# Patient Record
Sex: MALE | Race: WHITE | NOT HISPANIC OR LATINO | URBAN - METROPOLITAN AREA
[De-identification: names, ages, dates, MRNs, and addresses within clinical notes are randomized per-mention and may not be internally consistent; named-entity substitution may affect disease eponyms.]

---

## 2018-04-23 ENCOUNTER — INPATIENT (INPATIENT)
Facility: HOSPITAL | Age: 83
LOS: 14 days | Discharge: HOME | DRG: 228 | End: 2018-05-08
Attending: HOSPITALIST | Admitting: HOSPITALIST
Payer: MEDICARE

## 2018-04-23 VITALS
HEIGHT: 70 IN | TEMPERATURE: 98 F | SYSTOLIC BLOOD PRESSURE: 192 MMHG | WEIGHT: 169.98 LBS | RESPIRATION RATE: 20 BRPM | HEART RATE: 79 BPM | DIASTOLIC BLOOD PRESSURE: 105 MMHG | OXYGEN SATURATION: 100 %

## 2018-04-23 DIAGNOSIS — R06.02 SHORTNESS OF BREATH: ICD-10-CM

## 2018-04-23 LAB
ALBUMIN SERPL ELPH-MCNC: 3.8 G/DL — SIGNIFICANT CHANGE UP (ref 3.3–5)
ALP SERPL-CCNC: 111 U/L — SIGNIFICANT CHANGE UP (ref 40–120)
ALT FLD-CCNC: 35 U/L — SIGNIFICANT CHANGE UP (ref 10–45)
ANION GAP SERPL CALC-SCNC: 17 MMOL/L — SIGNIFICANT CHANGE UP (ref 5–17)
APTT BLD: 41.7 SEC — HIGH (ref 27.5–37.4)
AST SERPL-CCNC: 26 U/L — SIGNIFICANT CHANGE UP (ref 10–40)
BASE EXCESS BLDV CALC-SCNC: -1.4 MMOL/L — SIGNIFICANT CHANGE UP (ref -2–2)
BASOPHILS # BLD AUTO: 0 K/UL — SIGNIFICANT CHANGE UP (ref 0–0.2)
BASOPHILS NFR BLD AUTO: 0.1 % — SIGNIFICANT CHANGE UP (ref 0–2)
BILIRUB SERPL-MCNC: 0.4 MG/DL — SIGNIFICANT CHANGE UP (ref 0.2–1.2)
BUN SERPL-MCNC: 79 MG/DL — HIGH (ref 7–23)
CA-I SERPL-SCNC: 1.13 MMOL/L — SIGNIFICANT CHANGE UP (ref 1.12–1.3)
CALCIUM SERPL-MCNC: 8.5 MG/DL — SIGNIFICANT CHANGE UP (ref 8.4–10.5)
CHLORIDE BLDV-SCNC: 107 MMOL/L — SIGNIFICANT CHANGE UP (ref 96–108)
CHLORIDE SERPL-SCNC: 105 MMOL/L — SIGNIFICANT CHANGE UP (ref 96–108)
CK MB CFR SERPL CALC: 3 NG/ML — SIGNIFICANT CHANGE UP (ref 0–6.7)
CK SERPL-CCNC: 47 U/L — SIGNIFICANT CHANGE UP (ref 30–200)
CO2 BLDV-SCNC: 28 MMOL/L — SIGNIFICANT CHANGE UP (ref 22–30)
CO2 SERPL-SCNC: 20 MMOL/L — LOW (ref 22–31)
CREAT SERPL-MCNC: 2.88 MG/DL — HIGH (ref 0.5–1.3)
EOSINOPHIL # BLD AUTO: 0 K/UL — SIGNIFICANT CHANGE UP (ref 0–0.5)
EOSINOPHIL NFR BLD AUTO: 0.4 % — SIGNIFICANT CHANGE UP (ref 0–6)
GAS PNL BLDV: 140 MMOL/L — SIGNIFICANT CHANGE UP (ref 136–145)
GAS PNL BLDV: SIGNIFICANT CHANGE UP
GLUCOSE BLDV-MCNC: 198 MG/DL — HIGH (ref 70–99)
GLUCOSE SERPL-MCNC: 195 MG/DL — HIGH (ref 70–99)
HCO3 BLDV-SCNC: 26 MMOL/L — SIGNIFICANT CHANGE UP (ref 21–29)
HCT VFR BLD CALC: 37.7 % — LOW (ref 39–50)
HCT VFR BLDA CALC: 38 % — LOW (ref 39–50)
HGB BLD CALC-MCNC: 12.2 G/DL — LOW (ref 13–17)
HGB BLD-MCNC: 12.9 G/DL — LOW (ref 13–17)
INR BLD: 2.19 RATIO — HIGH (ref 0.88–1.16)
LACTATE BLDV-MCNC: 2.2 MMOL/L — HIGH (ref 0.7–2)
LYMPHOCYTES # BLD AUTO: 1.8 K/UL — SIGNIFICANT CHANGE UP (ref 1–3.3)
LYMPHOCYTES # BLD AUTO: 15.5 % — SIGNIFICANT CHANGE UP (ref 13–44)
MAGNESIUM SERPL-MCNC: 2 MG/DL — SIGNIFICANT CHANGE UP (ref 1.6–2.6)
MCHC RBC-ENTMCNC: 30.6 PG — SIGNIFICANT CHANGE UP (ref 27–34)
MCHC RBC-ENTMCNC: 34.3 GM/DL — SIGNIFICANT CHANGE UP (ref 32–36)
MCV RBC AUTO: 89.4 FL — SIGNIFICANT CHANGE UP (ref 80–100)
MONOCYTES # BLD AUTO: 1.2 K/UL — HIGH (ref 0–0.9)
MONOCYTES NFR BLD AUTO: 10.1 % — SIGNIFICANT CHANGE UP (ref 2–14)
NEUTROPHILS # BLD AUTO: 8.5 K/UL — HIGH (ref 1.8–7.4)
NEUTROPHILS NFR BLD AUTO: 73.8 % — SIGNIFICANT CHANGE UP (ref 43–77)
NT-PROBNP SERPL-SCNC: HIGH PG/ML (ref 0–300)
PCO2 BLDV: 61 MMHG — HIGH (ref 35–50)
PH BLDV: 7.26 — LOW (ref 7.35–7.45)
PHOSPHATE SERPL-MCNC: 3.9 MG/DL — SIGNIFICANT CHANGE UP (ref 2.5–4.5)
PLATELET # BLD AUTO: 332 K/UL — SIGNIFICANT CHANGE UP (ref 150–400)
PO2 BLDV: 28 MMHG — SIGNIFICANT CHANGE UP (ref 25–45)
POTASSIUM BLDV-SCNC: 6.2 MMOL/L — CRITICAL HIGH (ref 3.5–5)
POTASSIUM SERPL-MCNC: 4.5 MMOL/L — SIGNIFICANT CHANGE UP (ref 3.5–5.3)
POTASSIUM SERPL-SCNC: 4.5 MMOL/L — SIGNIFICANT CHANGE UP (ref 3.5–5.3)
PROT SERPL-MCNC: 6.7 G/DL — SIGNIFICANT CHANGE UP (ref 6–8.3)
PROTHROM AB SERPL-ACNC: 24 SEC — HIGH (ref 9.8–12.7)
RAPID RVP RESULT: SIGNIFICANT CHANGE UP
RBC # BLD: 4.22 M/UL — SIGNIFICANT CHANGE UP (ref 4.2–5.8)
RBC # FLD: 16.4 % — HIGH (ref 10.3–14.5)
SAO2 % BLDV: 45 % — LOW (ref 67–88)
SODIUM SERPL-SCNC: 142 MMOL/L — SIGNIFICANT CHANGE UP (ref 135–145)
TROPONIN T SERPL-MCNC: 0.04 NG/ML — SIGNIFICANT CHANGE UP (ref 0–0.06)
WBC # BLD: 11.5 K/UL — HIGH (ref 3.8–10.5)
WBC # FLD AUTO: 11.5 K/UL — HIGH (ref 3.8–10.5)

## 2018-04-23 PROCEDURE — 93010 ELECTROCARDIOGRAM REPORT: CPT

## 2018-04-23 PROCEDURE — 99291 CRITICAL CARE FIRST HOUR: CPT

## 2018-04-23 PROCEDURE — 99223 1ST HOSP IP/OBS HIGH 75: CPT | Mod: GC

## 2018-04-23 PROCEDURE — 71045 X-RAY EXAM CHEST 1 VIEW: CPT | Mod: 26

## 2018-04-23 RX ORDER — CALCIUM GLUCONATE 100 MG/ML
1 VIAL (ML) INTRAVENOUS ONCE
Qty: 0 | Refills: 0 | Status: COMPLETED | OUTPATIENT
Start: 2018-04-23 | End: 2018-04-23

## 2018-04-23 RX ORDER — HEPARIN SODIUM 5000 [USP'U]/ML
5000 INJECTION INTRAVENOUS; SUBCUTANEOUS EVERY 8 HOURS
Qty: 0 | Refills: 0 | Status: DISCONTINUED | OUTPATIENT
Start: 2018-04-23 | End: 2018-04-24

## 2018-04-23 RX ORDER — ASPIRIN/CALCIUM CARB/MAGNESIUM 324 MG
324 TABLET ORAL ONCE
Qty: 0 | Refills: 0 | Status: COMPLETED | OUTPATIENT
Start: 2018-04-23 | End: 2018-04-23

## 2018-04-23 RX ORDER — FUROSEMIDE 40 MG
80 TABLET ORAL ONCE
Qty: 0 | Refills: 0 | Status: COMPLETED | OUTPATIENT
Start: 2018-04-23 | End: 2018-04-23

## 2018-04-23 RX ORDER — SODIUM CHLORIDE 9 MG/ML
3 INJECTION INTRAMUSCULAR; INTRAVENOUS; SUBCUTANEOUS ONCE
Qty: 0 | Refills: 0 | Status: COMPLETED | OUTPATIENT
Start: 2018-04-23 | End: 2018-04-23

## 2018-04-23 RX ADMIN — SODIUM CHLORIDE 3 MILLILITER(S): 9 INJECTION INTRAMUSCULAR; INTRAVENOUS; SUBCUTANEOUS at 21:40

## 2018-04-23 RX ADMIN — Medication 200 GRAM(S): at 22:47

## 2018-04-23 RX ADMIN — Medication 80 MILLIGRAM(S): at 23:16

## 2018-04-23 RX ADMIN — Medication 324 MILLIGRAM(S): at 22:00

## 2018-04-23 NOTE — ED PROVIDER NOTE - PROGRESS NOTE DETAILS
Nash Colby MD (resident): pt improved w/ BIPAP. POCUS w/ bilateral pleural effusions. call #1 and 2 to Dr. Leonardo, awaiting call back. Nash Colby MD (resident): initial elevated K+ on VBG w/ peaked T waves on EKG, empiric Ca2+ given. however pt had normal K+ on CMP. no further hyperK+ Tx at this time. radiology read of xray shows ? infiltrate, no fever, wbc only 11.5, and given significant b/l pleural effusions noted on us and lack of other signs of infection, pneumonia unlikely, more likely effusions 2/2 renal and/or cardiac dysfunction.  Plan now for medicine admission on tele for further management, diuresis and weaning off bipap. Zan

## 2018-04-23 NOTE — ED PROVIDER NOTE - MUSCULOSKELETAL, MLM
Spine appears normal, range of motion is not limited, no muscle or joint tenderness.  2+ pitting edema in b/l LE to pre tibial region.

## 2018-04-23 NOTE — ED ADULT NURSE NOTE - OBJECTIVE STATEMENT
86y/o male with history of CHF, Afib on coumadin, presents to ED via wheelchair a&ox3 c/o SOB. Patient reports he has been feeling SOB since yesterday. Worsens when lying down, having trouble sleeping. Also c/o intermittent CP, resolved upon entering ED. States swelling to legs is a little worse than usual. Denies fever, cough, dizziness, N/V/D, urinary symptoms, recent travel, sick contacts. Crackles heard b/l to lungs. Abd soft, nontender, nondistended. O2sat 79% omn room air, placed on 4L nasal cannula, O2 increased to 100%. Respiratory paged for BiPAP as per MD Cordero's orders. EKG done in ED. Patient on CM. Family at bedside. Safety and comfort maintained.

## 2018-04-23 NOTE — ED PROVIDER NOTE - OBJECTIVE STATEMENT
88yo M with h/o CHF, HTN, exsmoker, Afib (on coumadin), presenting with worsenign shortness of breath and intermittent chest pain since last night; today, shortness of breath worsened, and patient came to ED for further evaluation.  Also c/o nasal congestion / post nasal drip sensation.  No fever/chills.  +cough.  No abdominal pain, no n/v/d.  + leg swelling.  On lasix 80mg qd at home.  Follows with cardiology at [place], has been admitted before at [facility].  Unknown last echo results or EF. 88yo M with h/o CHF, HTN, exsmoker, Afib (on coumadin), presenting with worsening shortness of breath and intermittent chest pain since last night; today, shortness of breath worsened, and patient came to ED for further evaluation.  Also c/o nasal congestion / post nasal drip sensation.  No fever/chills.  +cough.  No abdominal pain, no n/v/d.  + leg swelling.  On lasix 80mg qd at home.  Follows with cardiology at [place], has been admitted before at [facility].  Unknown last echo results or EF.

## 2018-04-23 NOTE — ED PROVIDER NOTE - CARE PLAN
Principal Discharge DX:	Shortness of breath  Secondary Diagnosis:	Congestive heart failure  Secondary Diagnosis:	Acute renal injury

## 2018-04-23 NOTE — ED PROVIDER NOTE - MEDICAL DECISION MAKING DETAILS
Nash Colby MD (resident): midsternal chest pain (intermittent, nonpleuritic) w/ SOB (worse w/ exertion and laying flat), a/w BLE swelling. Exam w/ rales and decreased BS bibasilar. POCUS w/ pleura effusions bilaterally. BIPAP. Pt already on lasix, took dose today. Will redose w/ lasix IV as pt w/ fluid overload.

## 2018-04-23 NOTE — ED PROVIDER NOTE - ATTENDING CONTRIBUTION TO CARE
I have examined and evaluated this patient with the above resident or PA, and agree with the documented clinical history, exam and plan.   Briefly: 88yo M with h/o CHF HTN ex-smoker, Afib on coumadin, presenting with SOB, hypoxia but speaking in full sentences and not appearing to be in respiratory distress.  2+ pitting edema in b/l LE to pre tibial region.  ECG afib, no st elevations, but somewhat peaked t waves (no prior for comparison): concern for possible hyperkalemia, will start calcium gluconate and if K elevated will give additional meds for hyperkalemia.    POCUS shows no global hypokinesis of LV, but lungs notable for b/l at least moderate pleural effusions, few scattered b lines in anterior lung fields.  Concerning for Renal dysfunction vs CHF vs infection: plan: CXR, labs including troponin and pro-bnp.  Will start bipap and give iv lasix.  Will need admission.

## 2018-04-23 NOTE — ED PROVIDER NOTE - CONSTITUTIONAL, MLM
normal... Well appearing, well nourished, awake, alert, oriented to person, place, time/situation and in no apparent distress.  Speaking in full sentences without difficulty, no tachypnea; however, hypoxic to 78-80% on room air.

## 2018-04-24 DIAGNOSIS — R91.8 OTHER NONSPECIFIC ABNORMAL FINDING OF LUNG FIELD: ICD-10-CM

## 2018-04-24 DIAGNOSIS — I48.2 CHRONIC ATRIAL FIBRILLATION: ICD-10-CM

## 2018-04-24 DIAGNOSIS — N17.9 ACUTE KIDNEY FAILURE, UNSPECIFIED: ICD-10-CM

## 2018-04-24 DIAGNOSIS — Z29.9 ENCOUNTER FOR PROPHYLACTIC MEASURES, UNSPECIFIED: ICD-10-CM

## 2018-04-24 DIAGNOSIS — I16.0 HYPERTENSIVE URGENCY: ICD-10-CM

## 2018-04-24 DIAGNOSIS — R63.8 OTHER SYMPTOMS AND SIGNS CONCERNING FOOD AND FLUID INTAKE: ICD-10-CM

## 2018-04-24 DIAGNOSIS — J96.01 ACUTE RESPIRATORY FAILURE WITH HYPOXIA: ICD-10-CM

## 2018-04-24 DIAGNOSIS — R73.9 HYPERGLYCEMIA, UNSPECIFIED: ICD-10-CM

## 2018-04-24 DIAGNOSIS — I50.33 ACUTE ON CHRONIC DIASTOLIC (CONGESTIVE) HEART FAILURE: ICD-10-CM

## 2018-04-24 LAB
APTT BLD: 39 SEC — HIGH (ref 27.5–37.4)
BASOPHILS # BLD AUTO: 0.03 K/UL — SIGNIFICANT CHANGE UP (ref 0–0.2)
BASOPHILS NFR BLD AUTO: 0.4 % — SIGNIFICANT CHANGE UP (ref 0–2)
CK MB BLD-MCNC: 6.7 % — HIGH (ref 0–3.5)
CK MB CFR SERPL CALC: 1.8 NG/ML — SIGNIFICANT CHANGE UP (ref 0–6.7)
CK SERPL-CCNC: 27 U/L — LOW (ref 30–200)
EOSINOPHIL # BLD AUTO: 0.05 K/UL — SIGNIFICANT CHANGE UP (ref 0–0.5)
EOSINOPHIL NFR BLD AUTO: 0.6 % — SIGNIFICANT CHANGE UP (ref 0–6)
HCT VFR BLD CALC: 33.3 % — LOW (ref 39–50)
HGB BLD-MCNC: 10.4 G/DL — LOW (ref 13–17)
IMM GRANULOCYTES NFR BLD AUTO: 1 % — SIGNIFICANT CHANGE UP (ref 0–1.5)
INR BLD: 2.17 RATIO — HIGH (ref 0.88–1.16)
LYMPHOCYTES # BLD AUTO: 1.01 K/UL — SIGNIFICANT CHANGE UP (ref 1–3.3)
LYMPHOCYTES # BLD AUTO: 13 % — SIGNIFICANT CHANGE UP (ref 13–44)
MCHC RBC-ENTMCNC: 28.6 PG — SIGNIFICANT CHANGE UP (ref 27–34)
MCHC RBC-ENTMCNC: 31.2 GM/DL — LOW (ref 32–36)
MCV RBC AUTO: 91.5 FL — SIGNIFICANT CHANGE UP (ref 80–100)
MONOCYTES # BLD AUTO: 0.91 K/UL — HIGH (ref 0–0.9)
MONOCYTES NFR BLD AUTO: 11.7 % — SIGNIFICANT CHANGE UP (ref 2–14)
NEUTROPHILS # BLD AUTO: 5.7 K/UL — SIGNIFICANT CHANGE UP (ref 1.8–7.4)
NEUTROPHILS NFR BLD AUTO: 73.3 % — SIGNIFICANT CHANGE UP (ref 43–77)
OSMOLALITY SERPL: 322 MOS/KG — HIGH (ref 275–300)
OSMOLALITY UR: 376 MOS/KG — SIGNIFICANT CHANGE UP (ref 300–900)
PLATELET # BLD AUTO: 274 K/UL — SIGNIFICANT CHANGE UP (ref 150–400)
PROTHROM AB SERPL-ACNC: 24.9 SEC — HIGH (ref 10–13.1)
RBC # BLD: 3.64 M/UL — LOW (ref 4.2–5.8)
RBC # FLD: 16.8 % — HIGH (ref 10.3–14.5)
SODIUM UR-SCNC: 62 MMOL/L — SIGNIFICANT CHANGE UP
TROPONIN T SERPL-MCNC: 0.05 NG/ML — SIGNIFICANT CHANGE UP (ref 0–0.06)
UUN UR-MCNC: 463 MG/DL — SIGNIFICANT CHANGE UP
WBC # BLD: 7.78 K/UL — SIGNIFICANT CHANGE UP (ref 3.8–10.5)
WBC # FLD AUTO: 7.78 K/UL — SIGNIFICANT CHANGE UP (ref 3.8–10.5)

## 2018-04-24 PROCEDURE — 99233 SBSQ HOSP IP/OBS HIGH 50: CPT

## 2018-04-24 PROCEDURE — 76770 US EXAM ABDO BACK WALL COMP: CPT | Mod: 26

## 2018-04-24 PROCEDURE — 71250 CT THORAX DX C-: CPT | Mod: 26

## 2018-04-24 RX ORDER — HYDRALAZINE HCL 50 MG
10 TABLET ORAL EVERY 6 HOURS
Qty: 0 | Refills: 0 | Status: DISCONTINUED | OUTPATIENT
Start: 2018-04-24 | End: 2018-04-24

## 2018-04-24 RX ORDER — FUROSEMIDE 40 MG
80 TABLET ORAL
Qty: 0 | Refills: 0 | Status: DISCONTINUED | OUTPATIENT
Start: 2018-04-24 | End: 2018-04-27

## 2018-04-24 RX ORDER — METOPROLOL TARTRATE 50 MG
50 TABLET ORAL
Qty: 0 | Refills: 0 | Status: DISCONTINUED | OUTPATIENT
Start: 2018-04-24 | End: 2018-04-24

## 2018-04-24 RX ORDER — METOPROLOL TARTRATE 50 MG
100 TABLET ORAL DAILY
Qty: 0 | Refills: 0 | Status: DISCONTINUED | OUTPATIENT
Start: 2018-04-24 | End: 2018-04-24

## 2018-04-24 RX ORDER — HYDRALAZINE HCL 50 MG
25 TABLET ORAL EVERY 12 HOURS
Qty: 0 | Refills: 0 | Status: DISCONTINUED | OUTPATIENT
Start: 2018-04-24 | End: 2018-05-08

## 2018-04-24 RX ORDER — WARFARIN SODIUM 2.5 MG/1
2 TABLET ORAL ONCE
Qty: 0 | Refills: 0 | Status: COMPLETED | OUTPATIENT
Start: 2018-04-24 | End: 2018-04-24

## 2018-04-24 RX ADMIN — Medication 80 MILLIGRAM(S): at 17:47

## 2018-04-24 RX ADMIN — Medication 10 MILLIGRAM(S): at 09:04

## 2018-04-24 RX ADMIN — Medication 80 MILLIGRAM(S): at 06:22

## 2018-04-24 RX ADMIN — WARFARIN SODIUM 2 MILLIGRAM(S): 2.5 TABLET ORAL at 21:17

## 2018-04-24 RX ADMIN — Medication 25 MILLIGRAM(S): at 17:44

## 2018-04-24 NOTE — H&P ADULT - ASSESSMENT
87 y.o male w/ pmhx a.fib, diastolic CHF (last echo noted 4/2015: LV 60%, severe pulm. htn, mod. AS), HTN presenting w/ c/o of...... 87 y.o male w/ pmhx a.fib, diastolic CHF (last echo noted 4/2015: LV 60%, severe pulm. htn, mod. AS), HTN presenting w/ c/o of CP, admitted for acute on chronic diastolic HF exacerbation most likely in setting of HTN urgency ad 87 y.o male w/ pmhx a.fib, diastolic CHF (last echo noted 4/2015: LV 60%, severe pulm. htn, mod. AS), HTN presenting w/ c/o of CP, admitted for acute on chronic diastolic HF exacerbation most likely in setting of HTN urgency and worsening renal function, ortiz on ckd. 87 y.o male w/ pmhx a.fib, diastolic CHF (last echo noted 4/2015: LV 60%, severe pulm. htn, mod. AS), HTN presenting w/ c/o of CP, admitted for hypoxic respiratory failure and acute on chronic diastolic HF exacerbation most likely in setting of HTN urgency and worsening renal function, ortiz on ckd.

## 2018-04-24 NOTE — CONSULT NOTE ADULT - ASSESSMENT
87 y.o male w/ pmhx a.fib, diastolic CHF (last echo noted 4/2015: LV 60%, severe pulm. htn, mod. 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   renal us revealing atrophic kidney as well with no hydro 87 y.o male w/ pmhx a.fib, diastolic CHF (last echo noted 4/2015: LV 60%, severe pulm. htn, mod. 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   renal us revealing atrophic kidney as well with no hydro      addendum: lasix 80 mg iv bid keep O> I

## 2018-04-24 NOTE — H&P ADULT - PROBLEM SELECTOR PLAN 6
-DASH diet w/ fluid restriction  -PT -CXR w/ evidence of RLL opacity, no clinical symptoms of pna/uri  -will obtain CT chest non contrast

## 2018-04-24 NOTE — ED ADULT NURSE REASSESSMENT NOTE - NS ED NURSE REASSESS COMMENT FT1
respiratory at bedside. Patient placed on BiPAP. Patient tolerating well.
Report given to holding PATRICE Cordero. Patient aware. Patient currently comfortable. VSS.

## 2018-04-24 NOTE — PROGRESS NOTE ADULT - PROBLEM SELECTOR PLAN 3
/105 on admission,  Got Lasix IV and Hydralazine. SBP has been running 148-164  - c/w current meds and will add amlodipine 5mg daily

## 2018-04-24 NOTE — CONSULT NOTE ADULT - CONSULT REQUESTED DATE/TIME
Catalina Marie   11/21/2017   Anticoagulation Therapy Visit    Description:  74 year old female   Provider:  Ian Tracy MD   Department:  [facility]           INR as of 11/21/2017     Today's INR 1.8!      Anticoagulation Summary as of 11/21/2017     INR goal 2.0-3.0   Today's INR 1.8!   Full instructions 2.5 mg on Fri; 5 mg all other days   Next INR check 11/28/2017    Indications   Long-term (current) use of anticoagulants [Z79.01] [Z79.01]  Atrial fibrillation (H) [I48.91] [I48.91]         November 2017 Details    Sun Mon Tue Wed Thu Fri Sat        1               2               3               4                 5               6               7               8               9               10               11                 12               13               14               15               16               17               18                 19               20               21      5 mg   See details      22      5 mg         23      5 mg         24      2.5 mg         25      5 mg           26      5 mg         27      5 mg         28            29               30                  Date Details   11/21 This INR check       Date of next INR:  11/28/2017         How to take your warfarin dose     To take:  2.5 mg Take 0.5 of a 5 mg tablet.    To take:  5 mg Take 1 of the 5 mg tablets.            24-Apr-2018 21:45

## 2018-04-24 NOTE — PROGRESS NOTE ADULT - SUBJECTIVE AND OBJECTIVE BOX
Patient is a 87y old  Male who presents with a chief complaint of SOB and CP.    SUBJECTIVE / OVERNIGHT EVENTS:  Lying in bed, no acute resp. distress, no c/o chest pain now. Tele- a.fib 50-90s, overnight rosa down to 34    MEDICATIONS  (STANDING):  furosemide   Injectable 80 milliGRAM(s) IV Push two times a day    MEDICATIONS  (PRN):  hydrALAZINE Injectable 10 milliGRAM(s) IV Push every 6 hours PRN for SBP>170      Vital Signs Last 24 Hrs  T(C): 36.4 (2018 12:51), Max: 36.7 (2018 21:40)  T(F): 97.6 (2018 12:51), Max: 98.1 (2018 01:00)  HR: 95 (2018 12:51) (50 - 95)  BP: 148/72 (2018 12:51) (145/71 - 192/105)  BP(mean): --  RR: 19 (2018 12:51) (18 - 26)  SpO2: 98% (2018 12:51) (79% - 100%)  CAPILLARY BLOOD GLUCOSE        I&O's Summary    2018 07:  -  2018 07:00  --------------------------------------------------------  IN: 0 mL / OUT: 450 mL / NET: -450 mL    2018 07:01  -  2018 14:06  --------------------------------------------------------  IN: 120 mL / OUT: 500 mL / NET: -380 mL        PHYSICAL EXAM:-  GENERAL: NAD, well-developed  EYES: EOMI, PERRLA, conjunctiva and sclera clear  NECK: Supple, No JVD, no thyromegaly  CHEST/LUNG: Clear to auscultation bilaterally; No wheeze  HEART: Regular rate and rhythm; S1, S2 audible, No murmurs, rubs, or gallops  ABDOMEN: Soft, Nontender, Nondistended; Bowel sounds present  EXTREMITIES:  2+ Peripheral Pulses, No clubbing, cyanosis, or edema  NEURO: AAOx3, no focal deficit      LABS:                        10.4   7.78  )-----------( 274      ( 2018 07:51 )             33.3     04-24    144  |  107  |  79<H>  ----------------------------<  126<H>  4.1   |  24  |  3.00<H>    Ca    8.1<L>      2018 06:48  Phos  3.9     04-24  Mg     1.9     -24    TPro  5.6<L>  /  Alb  3.1<L>  /  TBili  0.4  /  DBili  x   /  AST  16  /  ALT  26  /  AlkPhos  86  04-24    PT/INR - ( 2018 07:51 )   PT: 24.9 sec;   INR: 2.17 ratio         PTT - ( 2018 07:51 )  PTT:39.0 sec  CARDIAC MARKERS ( 2018 06:48 )  x     / 0.05 ng/mL / 27 U/L / x     / 1.8 ng/mL  CARDIAC MARKERS ( 2018 21:59 )  x     / 0.04 ng/mL / 47 U/L / x     / 3.0 ng/mL      Urinalysis Basic - ( 2018 08:24 )    Color: Yellow / Appearance: Clear / S.012 / pH: x  Gluc: x / Ketone: Negative  / Bili: Negative / Urobili: Negative   Blood: x / Protein: 300 mg/dL / Nitrite: Negative   Leuk Esterase: Negative / RBC: 0-2 /HPF / WBC 0-2 /HPF   Sq Epi: x / Non Sq Epi: x / Bacteria: Few /HPF      RADIOLOGY & ADDITIONAL TESTS:    Imaging Personally Reviewed: CXR  Consultant(s) Notes Reviewed:  Card, Renal  Care Discussed with Consultants/Other Providers: card, Renal

## 2018-04-24 NOTE — H&P ADULT - PROBLEM SELECTOR PLAN 3
-Cr. 2.88 (baseline Cr. 2.56 in 1/2016 as per [employer]), patient most likely in ortiz on ckd 2/2 acute diastolic HF 2/2 HTN urgency  -will manage w/ diuresis  -trend Cr daily to monitor for downtrend, strict I's/O's, hobbs placement, daily  weights, fluid restriction  -Urinalysis pending to r/o uti  -will obtain urine lytes   -consider renal u/s if Cr. does not improve -Cr. 2.88 (baseline Cr. 2.56 in 1/2016 as per [employer]), patient most likely in ortiz on ckd 2/2 acute diastolic HF 2/2 HTN urgency vs worsening prgressive ortiz  -will manage w/ diuresis, will bladder scan to r/o obstruction  -trend Cr daily to monitor for downtrend, strict I's/O's, daily  weights, fluid restriction  -will hold off on hobbs as patient would like to defer for now, if clinically worse, will re-discuss w/ patient about hobbs insertion  -Urinalysis pending to r/o uti  -will obtain urine lytes   -consider renal u/s if Cr. does not improve -/105 on admission, s/p lasix 80mgIV x1, currently /71  -patient admitted w/ HTN urgency, will continue to monitor BPs, will place on metoprolol tartrate 50mg BID, and hydralazine 10mg IV q 6 prn for SBP>170  -diuresis management as above  -patient currently on BIPAP, tolerating well, will continue on BIPAP

## 2018-04-24 NOTE — H&P ADULT - PROBLEM SELECTOR PLAN 2
-/105 on admission, s/p lasix 80mgIV x1, currently /71  -patient admitted w/ HTN urgency, will continue to monitor BPs, will continue w/ home beta-blocker  -diuresis management as above  -patient currently on BIPAP, tolerating well, will continue on BIPAP -/105 on admission, s/p lasix 80mgIV x1, currently /71  -patient admitted w/ HTN urgency, will continue to monitor BPs, will continue w/ home beta-blocker-metoprolol tartrate 100mg  -diuresis management as above  -patient currently on BIPAP, tolerating well, will continue on BIPAP -patient noted in ED documentation to become hypoxic to 79% on RA, then placed on BIPAP  -hypoxia most likeley 2/2 pulmonary edema as noted by POCUS  -CXR shows RLL opacity, however no clinical symptoms of URI/Pna, low suspicion for PE  -patient former smoker, will obtain CT chest non-contrast -patient noted in ED documentation to become hypoxic to 79% on RA, then placed on BIPAP  -hypoxia most likeley 2/2 pulmonary edema as noted by POCUS  -CXR shows RLL opacity, however no clinical symptoms of URI/Pna, low suspicion for PE as pt therapeutic on coumadin  -patient former smoker, will obtain CT chest non-contrast

## 2018-04-24 NOTE — H&P ADULT - PROBLEM SELECTOR PLAN 5
-elevated glucose on admission, 195  -will obtain a1c in AM  -continue w/ DASH diet for now CHADSVASC: 4, currently rate controlled  -INR 2.19, therapeutic, goal INR 2-3, check INR daily and dose accordingly

## 2018-04-24 NOTE — PROGRESS NOTE ADULT - PROBLEM SELECTOR PLAN 4
-Cr. 2.88 (had a Cr. of 2.56 in 1/2016 as per [employer]), patient may be in ortiz on ckd 2/2 acute diastolic HF 2/2 HTN urgency vs worsening prgressive ortiz  - Neil placed, Renal US ordered  - Renal consult called from Dr Hema Bob   He is anemia, will sent C3, C4, hepatitis panel, SPEP, UPEP, protein: Creatitine

## 2018-04-24 NOTE — H&P ADULT - HISTORY OF PRESENT ILLNESS
87 y.o male w/ pmhx a.fib, diastolic CHF (last echo noted 4/2015: LV 60%, severe pulm. htn, mod. AS), HTN presenting w/ c/o of......      ED vitals: temp. 97.5, HR 79, /105--->/71, RR 20 (max 26), 100% RA-->79% RA--->100% 4L NC--> now on BIPAP  In the ED: given ASA 324mg x 1, calcium gluconate x 1 g, 80IVmg lasix x 1 87 y.o male w/ pmhx a.fib, diastolic CHF (last echo noted 4/2015: LV 60%, severe pulm. htn, mod. AS), HTN presenting w/ c/o of CP x 1 day. Patient states he had sub-sternal sharp CP which started around 6pm on day of admission. CP was intermittent, non-radiating, no numbness/tingling, diaphoresis, palpitations noted.  He states he has never had this happen before. Associated symptoms include SOB x 2 days and decreased urination x 2months. He states he feel SOB when walking a couple feet and especially feels SOB when he is trying to have a conversation. He has also noted decreased urination in the last two months. Denies fevers/chills, cough, abdominal pain, changes in bowel habits, dysuria, incontinence. All other ROS is negative. No recent changes in medications, or drastic changes in diet. No recent sick contacts. Last saw cardiologist ([phone]) as per daughter six months ago, may have had an echo at that time, daughter unsure. Daughter and patient also state that patient used to have type II DM, used to be on insulin, and his sugars have been controlled, and he has been off medications for two years. He also used to follow a nephrologist, does not recall name, last saw nephrologist over two years ago as per patient.     Currently, CP resolved, SOB improved as per patient. All other ROS is negative.    Of note, [employer] as data from 2015, patient's prior cardiologist-Dr. Praveen Henderson  His notes indicate patient has chronic diastolic HF ,severe pulm HTn, mod AS. Had echo 4/2015-normal systolic function, LVEF: 60%  Labs from 1/2016 Cr. 2.56    ED vitals: temp. 97.5, HR 79, /105--->/71, RR 20 (max 26), 100% RA-->79% RA--->100% 4L NC--> now on BIPAP 10/5, Rate 12-->breathing at rate 18-20, FIO2 40%, , taking in -400  In the ED: given ASA 324mg x 1, calcium gluconate x 1 g, 80IVmg lasix x 1

## 2018-04-24 NOTE — H&P ADULT - NSHPREVIEWOFSYSTEMS_GEN_ALL_CORE
REVIEW OF SYSTEMS:  CONSTITUTIONAL: No fever, weight loss, or fatigue  EYES: No eye pain, visual disturbances, or discharge  ENMT:  No difficulty hearing, tinnitus, vertigo; No sinus or throat pain  NECK: No pain or stiffness  BREASTS: No pain, masses, or nipple discharge  RESPIRATORY: No cough, wheezing, chills or hemoptysis; No shortness of breath  CARDIOVASCULAR: No chest pain, palpitations, dizziness, or leg swelling  GASTROINTESTINAL: No abdominal or epigastric pain. No nausea, vomiting, or hematemesis; No diarrhea or constipation. No melena or hematochezia.  GENITOURINARY: No dysuria, frequency, hematuria, or incontinence  NEUROLOGICAL: No headaches, memory loss, loss of strength, numbness, or tremors  SKIN: No itching, burning, rashes, or lesions   LYMPH NODES: No enlarged glands  ENDOCRINE: No heat or cold intolerance; No hair loss  MUSCULOSKELETAL: No joint pain or swelling; No muscle, back, or extremity pain  PSYCHIATRIC: No depression, anxiety, mood swings, or difficulty sleeping  HEME/LYMPH: No easy bruising, or bleeding gums  ALLERY AND IMMUNOLOGIC: No hives or eczema REVIEW OF SYSTEMS:  CONSTITUTIONAL: No fever, weight loss, or fatigue  EYES: No eye pain, visual disturbances, or discharge  ENMT:  No difficulty hearing, tinnitus, vertigo; No sinus or throat pain  NECK: No pain or stiffness  RESPIRATORY: +SOB  CARDIOVASCULAR: +CP  GASTROINTESTINAL: No abdominal or epigastric pain. No nausea, vomiting, or hematemesis; No diarrhea or constipation. No melena or hematochezia.  GENITOURINARY: No dysuria, frequency, hematuria, or incontinence  NEUROLOGICAL: No headaches, memory loss, loss of strength, numbness, or tremors  SKIN: No itching, burning, rashes, or lesions   LYMPH NODES: No enlarged glands  ENDOCRINE: No heat or cold intolerance; No hair loss  MUSCULOSKELETAL: No joint pain or swelling; No muscle, back, or extremity pain  HEME/LYMPH: No easy bruising, or bleeding gums  ALLERY AND IMMUNOLOGIC: No hives or eczema

## 2018-04-24 NOTE — H&P ADULT - PROBLEM SELECTOR PLAN 1
-proBNP [id number], CXR w/ right pleural effusion, POCUS performed in ED w/ moderate bilateral pleural effusions, scattered B-lines  -most likely acute diastolic HF in setting of HTN urgency   -CE x 1 negative, continue to trend  -continue w/ beta-blocker, currently BPs stable, will continue to monitor  -currently on BIPAP, tolerating well, will continue for now  -daily weights, strict I's/O's, low salt diet, fluid restriction  -obtain echo  -cards consult -proBNP [id number], CXR w/ right pleural effusion, POCUS performed in ED w/ moderate bilateral pleural effusions, scattered B-lines, w/ clinical symptoms of SOB, decreased urination, most likely acute on chronic diastolic HF in setting of HTN urgency and possibly worsening renal function (as clinical hx of dec. urination)  -CE x 1 negative, continue to trend, ekg shows no acute ST changes  -s/p lasix 80mg IV, monitor for urine output, if no response, consider metolozane w/ additional 80mgIV lasix  -continue w/ beta-blocker-metoprolol tartrate 100qd, currently BPs stable, will continue to monitor  -currently on BIPAP, tolerating well, will continue for now  -daily weights, strict I's/O's, low salt diet, fluid restriction. will hold off on hobbs as patient would like to defer for now, if clinically worse, will re-discuss w/ patient about hobbs insertion  -bladder scan to r/o obstruction  -obtain echo, obtain collateral from cardiologist in AM  -cards consult -proBNP [id number], CXR w/ right pleural effusion, POCUS performed in ED w/ moderate bilateral pleural effusions, scattered B-lines, w/ clinical symptoms of SOB, decreased urination, most likely acute on chronic diastolic HF in setting of HTN urgency and possibly worsening renal function (as clinical hx of dec. urination)  -CE x 1 negative, continue to trend, ekg shows no acute ST changes  -s/p lasix 80mg IV, will continue w/ lasix 80mg IV BID, monitor for urine output  -will place on metoprolol tartrate 50mg BID, currently BPs stable, will continue to monitor  -currently on BIPAP, tolerating well, will continue for now  -daily weights, strict I's/O's, low salt diet, fluid restriction. will hold off on hobbs as patient would like to defer for now, if clinically worse, will re-discuss w/ patient about hobbs insertion  -bladder scan to r/o obstruction  -obtain echo, obtain collateral from cardiologist in AM  -cards consult -proBNP [id number], CXR w/ right pleural effusion, POCUS performed in ED w/ moderate bilateral pleural effusions, scattered B-lines, w/ clinical symptoms of SOB, decreased urination, most likely acute on chronic diastolic HF in setting of HTN urgency and possibly worsening renal function (as clinical hx of dec. urination)  -CE x 1 negative, continue to trend, ekg shows no acute ST changes  -s/p lasix 80mg IV, will continue w/ lasix 80mg IV BID, monitor for urine output  -will place on metoprolol tartrate 50mg BID, currently BPs stable, will continue to monitor  -currently on BIPAP, tolerating well, will continue for now  -daily weights, strict I's/O's, low salt diet, fluid restriction. Ordered hobbs but patient would like to defer for now, if clinically worse, will re-discuss w/ patient about hobbs insertion  -bladder scan to r/o obstruction  -obtain echo, obtain collateral from cardiologist in AM  -cards consult

## 2018-04-24 NOTE — PROGRESS NOTE ADULT - PROBLEM SELECTOR PLAN 7
He was diabetic and as per daughter it was controlled on dist.  FSBS has been elevated  -will obtain HbA1C in am  - HSS  -continue w/ DASH diet for now

## 2018-04-24 NOTE — PROGRESS NOTE ADULT - ASSESSMENT
87 y.o male w/ pmhx a.fib, diastolic CHF (last echo noted 4/2015: LV 60%, severe pulm. htn, mod. AS), HTN presenting w/ c/o of CP, SOB admitted for hypoxic respiratory failure and acute on chronic diastolic HF exacerbation most likely in setting of HTN urgency and worsening renal function, ortiz on ckd.

## 2018-04-24 NOTE — H&P ADULT - NSHPLABSRESULTS_GEN_ALL_CORE
Personally Reviewed labs, imaging, ekg by me  04-23    142  |  105  |  79<H>  ----------------------------<  195<H>  4.5   |  20<L>  |  2.88<H>    Ca    8.5      23 Apr 2018 21:59  Phos  3.9     04-23  Mg     2.0     04-23    TPro  6.7  /  Alb  3.8  /  TBili  0.4  /  DBili  x   /  AST  26  /  ALT  35  /  AlkPhos  111  04-23      PT/INR - ( 23 Apr 2018 21:58 )   PT: 24.0 sec;   INR: 2.19 ratio         PTT - ( 23 Apr 2018 21:58 )  PTT:41.7 sec                                        12.9   11.5  )-----------( 332      ( 23 Apr 2018 21:58 )             37.7     CAPILLARY BLOOD GLUCOSE        < from: Xray Chest 1 View AP/PA (04.23.18 @ 22:14) >    INTERPRETATION:  There is a patchy right lower lung opacity.  There is a small right pleural effusion.    < end of copied text >    EKG: Personally Reviewed labs, imaging, ekg by me  04-23    142  |  105  |  79<H>  ----------------------------<  195<H>  4.5   |  20<L>  |  2.88<H>    Ca    8.5      23 Apr 2018 21:59  Phos  3.9     04-23  Mg     2.0     04-23    TPro  6.7  /  Alb  3.8  /  TBili  0.4  /  DBili  x   /  AST  26  /  ALT  35  /  AlkPhos  111  04-23      PT/INR - ( 23 Apr 2018 21:58 )   PT: 24.0 sec;   INR: 2.19 ratio         PTT - ( 23 Apr 2018 21:58 )  PTT:41.7 sec                                        12.9   11.5  )-----------( 332      ( 23 Apr 2018 21:58 )             37.7     CAPILLARY BLOOD GLUCOSE        < from: Xray Chest 1 View AP/PA (04.23.18 @ 22:14) >   a. fib, HR 80, no acute ST changes, left axis deviation Personally Reviewed labs, imaging, ekg by me  04-23    142  |  105  |  79<H>  ----------------------------<  195<H>  4.5   |  20<L>  |  2.88<H>    Ca    8.5      23 Apr 2018 21:59  Phos  3.9     04-23  Mg     2.0     04-23    TPro  6.7  /  Alb  3.8  /  TBili  0.4  /  DBili  x   /  AST  26  /  ALT  35  /  AlkPhos  111  04-23      PT/INR - ( 23 Apr 2018 21:58 )   PT: 24.0 sec;   INR: 2.19 ratio         PTT - ( 23 Apr 2018 21:58 )  PTT:41.7 sec                                        12.9   11.5  )-----------( 332      ( 23 Apr 2018 21:58 )             37.7     CAPILLARY BLOOD GLUCOSE        < from: Xray Chest 1 View AP/PA (04.23.18 @ 22:14) >  EKG  a. fib, HR 80, . no acute ST changes, left axis deviation. ?anterior fascicular block. Qwave in III/V1/V2.

## 2018-04-24 NOTE — PROGRESS NOTE ADULT - PROBLEM SELECTOR PLAN 5
CHADSVASC: 4, currently rate controlled. Overnight HR wa sin 34  -INR 2.19 today,   - Cardiology f/u, c/w Metoprolol and coumadin

## 2018-04-24 NOTE — PROGRESS NOTE ADULT - PROBLEM SELECTOR PLAN 1
The patient has been breathing better on IV Lasix 80mg q 12hrs, Afebrile, Reviewed CXR and Old Echo. ProBNP 12430 in the setting of renal dysfunction., He has been on Lasix 80mg daily at home and urinating less.  - Card consult called, Dr Hermelindo Magana. Spoke to Daughter, Leslie [phone]. She will bring labs, Echo from [facility]  - c/w Metoprolol. Daily I/O, weight  - Echo pending

## 2018-04-24 NOTE — H&P ADULT - PROBLEM SELECTOR PLAN 4
CHADSVASC: 4, currently rate controlled  -INR 2.19, therapeutic, goal INR 2-3, check INR daily and dose accordingly -Cr. 2.88 (baseline Cr. 2.56 in 1/2016 as per [facility]), patient most likely in ortiz on ckd 2/2 acute diastolic HF 2/2 HTN urgency vs worsening prgressive ortiz  -will manage w/ diuresis, will bladder scan to r/o obstruction  -trend Cr daily to monitor for downtrend, strict I's/O's, daily  weights, fluid restriction  -will hold off on hobbs as patient would like to defer for now, if clinically worse, will re-discuss w/ patient about hobbs insertion  -Urinalysis pending to r/o uti  -will obtain urine lytes   -consider renal u/s if Cr. does not improve -Cr. 2.88 (had a Cr. of 2.56 in 1/2016 as per [facility]), patient may be in ortiz on ckd 2/2 acute diastolic HF 2/2 HTN urgency vs worsening prgressive ortiz  -will manage w/ diuresis, will bladder scan to r/o obstruction  -trend Cr daily to monitor for downtrend, strict I's/O's, daily  weights, fluid restriction  -will hold off on hobbs as patient would like to defer for now, if clinically worse, will re-discuss w/ patient about hobbs insertion  -Urinalysis pending to r/o uti  -will obtain urine lytes   -consider renal c/s if Cr. does not improve

## 2018-04-24 NOTE — H&P ADULT - NSHPPHYSICALEXAM_GEN_ALL_CORE
Vital Signs Last 24 Hrs  T(C): 36.7 (23 Apr 2018 21:40), Max: 36.7 (23 Apr 2018 21:40)  T(F): 98 (23 Apr 2018 21:40), Max: 98 (23 Apr 2018 21:40)  HR: 65 (23 Apr 2018 22:53) (65 - 80)  BP: 145/71 (23 Apr 2018 22:53) (145/71 - 192/105)  BP(mean): --  RR: 20 (23 Apr 2018 22:53) (20 - 26)  SpO2: 100% (23 Apr 2018 22:53) (79% - 100%)  PHYSICAL EXAM:  GENERAL: NAD, well-groomed, well-developed  HEAD:  Atraumatic, Normocephalic  EYES: EOMI, PERRLA, conjunctiva and sclera clear  ENMT: No tonsillar erythema, exudates, or enlargement; Moist mucous membranes, Good dentition, No lesions  NECK: Supple, No JVD, Normal thyroid  CHEST/LUNG: Clear to percussion bilaterally; No rales, rhonchi, wheezing, or rubs  HEART: Regular rate and rhythm; No murmurs, rubs, or gallops  ABDOMEN: Soft, Nontender, Nondistended; Bowel sounds present  EXTREMITIES:  2+ Peripheral Pulses, No clubbing, cyanosis, or edema  LYMPH: No lymphadenopathy noted  SKIN: No rashes or lesions  NERVOUS SYSTEM:  Alert & Oriented X3, Good concentration; Motor Strength 5/5 B/L upper and lower extremities; DTRs 2+ intact and symmetric Vital Signs Last 24 Hrs  T(C): 36.7 (23 Apr 2018 21:40), Max: 36.7 (23 Apr 2018 21:40)  T(F): 98 (23 Apr 2018 21:40), Max: 98 (23 Apr 2018 21:40)  HR: 65 (23 Apr 2018 22:53) (65 - 80)  BP: 145/71 (23 Apr 2018 22:53) (145/71 - 192/105)  BP(mean): --  RR: 20 (23 Apr 2018 22:53) (20 - 26)  SpO2: 100% (23 Apr 2018 22:53) (79% - 100%)  PHYSICAL EXAM:  GENERAL: NAD, on BIPAP  HEAD:  Atraumatic, Normocephalic  EYES: conjunctiva and sclera clear  ENMT: No tonsillar erythema, exudates, or enlargement  NECK: Supple, No JVD  CHEST/LUNG: mild crackles in lower bases  HEART: irregular rate and rhythm; No murmurs, rubs, or gallops  ABDOMEN: Soft, Nontender, Nondistended; Bowel sounds present  EXTREMITIES:  2+ Peripheral Pulses, No clubbing, cyanosis, or edema  LYMPH: No lymphadenopathy noted  SKIN: No rashes or lesions  NERVOUS SYSTEM:  Alert & Oriented X3 Vital Signs Last 24 Hrs  T(C): 36.7 (23 Apr 2018 21:40), Max: 36.7 (23 Apr 2018 21:40)  T(F): 98 (23 Apr 2018 21:40), Max: 98 (23 Apr 2018 21:40)  HR: 65 (23 Apr 2018 22:53) (65 - 80)  BP: 145/71 (23 Apr 2018 22:53) (145/71 - 192/105)  BP(mean): --  RR: 20 (23 Apr 2018 22:53) (20 - 26)  SpO2: 100% (23 Apr 2018 22:53) (79% - 100%)  PHYSICAL EXAM:  GENERAL: NAD, on BIPAP  HEAD:  Atraumatic, Normocephalic  EYES: conjunctiva and sclera clear  ENMT: No tonsillar erythema, exudates, or enlargement  NECK: Supple, No JVD  CHEST/LUNG: mild crackles in lower bases  HEART: irregular rate and rhythm; No murmurs, rubs, or gallops  ABDOMEN: Soft, Nontender, Nondistended; Bowel sounds present  EXTREMITIES:  2+ Peripheral Pulses, No clubbing, cyanosis. +2 edema in LE's  LYMPH: No lymphadenopathy noted  SKIN: No rashes or lesions  NERVOUS SYSTEM:  Alert & Oriented X3 Vital Signs Last 24 Hrs  T(C): 36.7 (23 Apr 2018 21:40), Max: 36.7 (23 Apr 2018 21:40)  T(F): 98 (23 Apr 2018 21:40), Max: 98 (23 Apr 2018 21:40)  HR: 65 (23 Apr 2018 22:53) (65 - 80)  BP: 145/71 (23 Apr 2018 22:53) (145/71 - 192/105)  BP(mean): --  RR: 20 (23 Apr 2018 22:53) (20 - 26)  SpO2: 100% (23 Apr 2018 22:53) (79% - 100%)  PHYSICAL EXAM:  GENERAL: NAD, on BIPAP  HEAD:  Atraumatic, Normocephalic  EYES: conjunctiva and sclera clear  ENMT: No tonsillar erythema, exudates, or enlargement  NECK: Supple, No JVD  CHEST/LUNG: mild crackles in lower bases  HEART: irregular rate and rhythm; crescendo-decrescendo systolic murmur 2/6  ABDOMEN: Soft, Nontender, Nondistended; Bowel sounds present  EXTREMITIES:  2+ Peripheral Pulses, No clubbing, cyanosis. +2 edema in LE's  LYMPH: No lymphadenopathy noted  SKIN: No rashes or lesions  NERVOUS SYSTEM:  Alert & Oriented X3 Vital Signs Last 24 Hrs  T(C): 36.7 (23 Apr 2018 21:40), Max: 36.7 (23 Apr 2018 21:40)  T(F): 98 (23 Apr 2018 21:40), Max: 98 (23 Apr 2018 21:40)  HR: 65 (23 Apr 2018 22:53) (65 - 80)  BP: 145/71 (23 Apr 2018 22:53) (145/71 - 192/105)  BP(mean): --  RR: 20 (23 Apr 2018 22:53) (20 - 26)  SpO2: 100% (23 Apr 2018 22:53) (79% - 100%)  PHYSICAL EXAM:  GENERAL: NAD, on BIPAP  HEAD:  Atraumatic, Normocephalic  EYES: conjunctiva and sclera clear  ENMT: No tonsillar erythema, exudates, or enlargement  NECK: Supple, No JVD  CHEST/LUNG: mild crackles in lower bases  HEART: irregular rate and rhythm; crescendo-decrescendo systolic murmur 2/6  ABDOMEN: Soft, Nontender, Nondistended; Bowel sounds present  EXTREMITIES:  2+ Peripheral Pulses, No clubbing, cyanosis. +2 pitting edema in b/l LE's  LYMPH: No lymphadenopathy noted  SKIN: No rashes or lesions  NERVOUS SYSTEM:  Alert & Oriented X3

## 2018-04-24 NOTE — H&P ADULT - PROBLEM SELECTOR PLAN 7
-currently therapeutic INR 2.19, will dose coumadin daily w/ INR checks -elevated glucose on admission, 195  -will obtain a1c in AM  -continue w/ DASH diet for now

## 2018-04-24 NOTE — H&P ADULT - NSHPSOCIALHISTORY_GEN_ALL_CORE
Social History:    Marital Status:  (   )    (   ) Single    (   )    (  )   Occupation:   Lives with: (  ) alone  (  ) children   (  ) spouse   (  ) parents  (  ) other    Substance Use (street drugs): (  ) never used  (  ) other:  Tobacco Usage:  (   ) never smoked   (   ) former smoker   (   ) current smoker  (     ) pack year  (        ) last cigarette date  Alcohol Usage:  Sexual History: Social History:    Marital Status:  (   )    (   ) Single    (X)    (  )   Occupation: used to drive  Lives with: (X) alone  (  ) children   (  ) spouse   (  ) parents  (  ) other    Substance Use (street drugs): (X) never used  (  ) other:  Tobacco Usage:  (   ) never smoked   (X) former smoker-quit 40 years ago, smoked 2PPD-does not know for how many years   (   ) current smoker  (     ) pack year  (        ) last cigarette date  Alcohol Usage: none

## 2018-04-24 NOTE — CONSULT NOTE ADULT - SUBJECTIVE AND OBJECTIVE BOX
[facility]  [phone]  NEPHROLOGY      INITIAL CONSULT NOTE  --------------------------------------------------------------------------------    HPI:  87 y.o male w/ pmhx a.fib, diastolic CHF (last echo noted 4/2015: LV 60%, severe pulm. htn, mod. AS), HTN presenting w/ c/o of CP x 1 day. Patient states he had sub-sternal sharp CP which started around 6pm on day of admission. CP was intermittent, non-radiating, no numbness/tingling, diaphoresis, palpitations noted, SOB x 2 days and decreased urination recently. Labs from 1/2016 Cr. 2.56. now with worsened renal function. renal consult called.     ED vitals: temp. 97.5, HR 79, /105--->/71, RR 20 (max 26), 100% RA-->79% RA--->100% 4L NC-->  BIPAP 10/5, Rate 12-->breathing at rate 18-20, FIO2 40%, ,   In the ED: given ASA 324mg x 1, calcium gluconate x 1 g, 80IVmg lasix x 1 (24 Apr 2018 00:00)        PAST HISTORY  --------------------------------------------------------------------------------  PAST MEDICAL & SURGICAL HISTORY:  Congestive heart failure  HTN (hypertension)  Atrial fibrillation  No significant past surgical history    FAMILY HISTORY:  No pertinent family history in first degree relatives    PAST SOCIAL HISTORY: currently denies tobacco and alcohol use     ALLERGIES & MEDICATIONS  --------------------------------------------------------------------------------  Allergies    No Known Allergies    Intolerances      Standing Inpatient Medications  furosemide   Injectable 80 milliGRAM(s) IV Push two times a day  hydrALAZINE 25 milliGRAM(s) Oral every 12 hours    PRN Inpatient Medications      REVIEW OF SYSTEMS  --------------------------------------------------------------------------------  Gen: No  fevers/chills   Skin: No rashes  Head/Eyes/Ears/Mouth: No headache; Normal hearing;  No sinus pain/discomfort, sore throat  Respiratory: + dyspnea, -cough, - wheezing, - hemoptysis  CV: No chest pain, or palp currently   GI: No abdominal pain, diarrhea, nausea, vomiting, melena  : No dysuria, decrease urination or hesitancy urinating  hematuria  MSK: No joint pain/swelling; no back pain no nsaids use   Neuro: No dizziness/lightheadedness    All other systems were reviewed and are negative, except as noted.    VITALS/PHYSICAL EXAM  --------------------------------------------------------------------------------  T(C): 36.4 (04-24-18 @ 12:51), Max: 36.7 (04-24-18 @ 01:00)  HR: 83 (04-24-18 @ 17:41) (50 - 95)  BP: 182/96 (04-24-18 @ 17:41) (145/71 - 182/96)  RR: 19 (04-24-18 @ 12:51) (18 - 20)  SpO2: 98% (04-24-18 @ 12:51) (98% - 100%)  Wt(kg): --  Height (cm): 177.8 (04-24-18 @ 02:30)  Weight (kg): 79.7 (04-24-18 @ 02:30)  BMI (kg/m2): 25.2 (04-24-18 @ 02:30)  BSA (m2): 1.98 (04-24-18 @ 02:30)      04-23-18 @ 07:01  -  04-24-18 @ 07:00  --------------------------------------------------------  IN: 0 mL / OUT: 450 mL / NET: -450 mL    04-24-18 @ 07:01  -  04-24-18 @ 21:45  --------------------------------------------------------  IN: 360 mL / OUT: 500 mL / NET: -140 mL      Physical Exam:  	Gen: Non toxic ; mildly sob   	+ jvd , supple neck,   	Pulm: decrease bs  no rales or ronchi or wheezing  	CV: RRR, S1S2; no rub  	Back: No CVA tenderness; no sacral edema  	Abd: +BS, soft, nontender/nondistended  	: No suprapubic tenderness  	UE: Warm, no cyanosis  no clubbing,  no edema  	LE: Warm, no cyanosis  no clubbing, 2+ edema  	Neuro: alert and oriented. speech coherent   	  LABS/STUDIES  --------------------------------------------------------------------------------              10.4   7.78  >-----------<  274      [04-24-18 @ 07:51]              33.3     144  |  107  |  79  ----------------------------<  126      [04-24-18 @ 06:48]  4.1   |  24  |  3.00        Ca     8.1     [04-24-18 @ 06:48]      Mg     1.9     [04-24-18 @ 06:48]      Phos  3.9     [04-24-18 @ 06:48]    TPro  5.6  /  Alb  3.1  /  TBili  0.4  /  DBili  x   /  AST  16  /  ALT  26  /  AlkPhos  86  [04-24-18 @ 06:48]    PT/INR: PT 24.9 , INR 2.17       [04-24-18 @ 07:51]  PTT: 39.0       [04-24-18 @ 07:51]    Troponin 0.05      [04-24-18 @ 06:48]  CK 27      [04-24-18 @ 06:48]  Serum Osmolality 322      [04-24-18 @ 06:48]    Creatinine Trend:  SCr 3.00 [04-24 @ 06:48]  SCr 2.88 [04-23 @ 21:59]    Urinalysis - [04-24-18 @ 08:24]      Color Yellow / Appearance Clear / SG 1.012 / pH 5.5      Gluc Negative / Ketone Negative  / Bili Negative / Urobili Negative       Blood Negative / Protein 300 / Leuk Est Negative / Nitrite Negative      RBC 0-2 / WBC 0-2 / Hyaline  / Gran  / Sq Epi  / Non Sq Epi  / Bacteria Few    Urine Sodium 62      [04-24-18 @ 06:48]  Urine Urea Nitrogen 463      [04-24-18 @ 07:54]  Urine Osmolality 376      [04-24-18 @ 06:48]    < from: CT Chest No Cont (04.24.18 @ 17:06) >    ******PRELIMINARY REPORT******    ******PRELIMINARY REPORT******          EXAM:  CT CHEST                            PROCEDURE DATE:  04/24/2018      ******PRELIMINARY REPORT******    ******PRELIMINARY REPORT******              INTERPRETATION:  Bilateral pleural effusions, right greater than left,   with adjacent passive atelectasis.  Cardiomegaly.    Follow up final report.        < from: US Kidney and Bladder (04.24.18 @ 16:39) >    EXAM:  US KIDNEYS AND BLADDER                            PROCEDURE DATE:  04/24/2018            INTERPRETATION:  CLINICAL INFORMATION: Acute kidney injury, creatinine of   2.88.    COMPARISON: None available.    TECHNIQUE: Sonography of the kidneysand bladder.     FINDINGS:    Right kidney: Atrophic, measuring 7.7 cm. Increased cortical   echogenicity. No renal mass, hydronephrosis or calculi.    Left kidney: 13.0 cm. No renal mass, hydronephrosis or calculi. Mild   increase in the echogenicity of the renal parenchyma is noted with   cortical thickness lower limits of normal    Urinary bladder: Collapsed around Neil catheter balloon.    IMPRESSION:   .  No hydronephrosis.    Atrophic echogenic right kidney.    Cannot exclude renal parenchymal disease left kidney.            < end of copied text >

## 2018-04-25 LAB
% ALBUMIN: 61.8 % — SIGNIFICANT CHANGE UP
% ALPHA 1: 6.1 % — SIGNIFICANT CHANGE UP
% ALPHA 2: 10.1 % — SIGNIFICANT CHANGE UP
% BETA: 10.4 % — SIGNIFICANT CHANGE UP
% GAMMA: 11.6 % — SIGNIFICANT CHANGE UP
ALBUMIN SERPL ELPH-MCNC: 3.5 G/DL — LOW (ref 3.6–5.5)
ALBUMIN/GLOB SERPL ELPH: 1.7 RATIO — SIGNIFICANT CHANGE UP
ALPHA1 GLOB SERPL ELPH-MCNC: 0.3 G/DL — SIGNIFICANT CHANGE UP (ref 0.1–0.4)
ALPHA2 GLOB SERPL ELPH-MCNC: 0.6 G/DL — SIGNIFICANT CHANGE UP (ref 0.5–1)
ANION GAP SERPL CALC-SCNC: 13 MMOL/L — SIGNIFICANT CHANGE UP (ref 5–17)
B-GLOBULIN SERPL ELPH-MCNC: 0.6 G/DL — SIGNIFICANT CHANGE UP (ref 0.5–1)
BUN SERPL-MCNC: 83 MG/DL — HIGH (ref 7–23)
C3 SERPL-MCNC: 86 MG/DL — SIGNIFICANT CHANGE UP (ref 80–180)
C4 SERPL-MCNC: 28 MG/DL — SIGNIFICANT CHANGE UP (ref 10–45)
CALCIUM SERPL-MCNC: 8.2 MG/DL — LOW (ref 8.4–10.5)
CHLORIDE SERPL-SCNC: 107 MMOL/L — SIGNIFICANT CHANGE UP (ref 96–108)
CHOLEST SERPL-MCNC: 135 MG/DL — SIGNIFICANT CHANGE UP (ref 10–199)
CO2 SERPL-SCNC: 25 MMOL/L — SIGNIFICANT CHANGE UP (ref 22–31)
CREAT ?TM UR-MCNC: 59 MG/DL — SIGNIFICANT CHANGE UP
CREAT SERPL-MCNC: 3.02 MG/DL — HIGH (ref 0.5–1.3)
ERYTHROCYTE [SEDIMENTATION RATE] IN BLOOD: 34 MM/HR — HIGH (ref 0–20)
GAMMA GLOBULIN: 0.6 G/DL — SIGNIFICANT CHANGE UP (ref 0.6–1.6)
GLUCOSE SERPL-MCNC: 128 MG/DL — HIGH (ref 70–99)
HAV IGM SER-ACNC: SIGNIFICANT CHANGE UP
HBA1C BLD-MCNC: 5.5 % — SIGNIFICANT CHANGE UP (ref 4–5.6)
HBV CORE IGM SER-ACNC: SIGNIFICANT CHANGE UP
HBV SURFACE AG SER-ACNC: SIGNIFICANT CHANGE UP
HCV AB S/CO SERPL IA: 0.08 S/CO — SIGNIFICANT CHANGE UP
HCV AB SERPL-IMP: SIGNIFICANT CHANGE UP
HDLC SERPL-MCNC: 25 MG/DL — LOW (ref 40–125)
INR BLD: 1.95 RATIO — HIGH (ref 0.88–1.16)
LIPID PNL WITH DIRECT LDL SERPL: 76 MG/DL — SIGNIFICANT CHANGE UP
POTASSIUM SERPL-MCNC: 4.3 MMOL/L — SIGNIFICANT CHANGE UP (ref 3.5–5.3)
POTASSIUM SERPL-SCNC: 4.3 MMOL/L — SIGNIFICANT CHANGE UP (ref 3.5–5.3)
PROT ?TM UR-MCNC: 248 MG/DL — HIGH (ref 0–12)
PROT PATTERN SERPL ELPH-IMP: SIGNIFICANT CHANGE UP
PROT SERPL-MCNC: 5.6 G/DL — LOW (ref 6–8.3)
PROT/CREAT UR-RTO: 4.2 RATIO — HIGH (ref 0–0.2)
PROTHROM AB SERPL-ACNC: 21.6 SEC — HIGH (ref 9.8–12.7)
RHEUMATOID FACT SERPL-ACNC: <7 IU/ML — SIGNIFICANT CHANGE UP (ref 0–13)
SODIUM SERPL-SCNC: 145 MMOL/L — SIGNIFICANT CHANGE UP (ref 135–145)
TOTAL CHOLESTEROL/HDL RATIO MEASUREMENT: 5.4 RATIO — SIGNIFICANT CHANGE UP (ref 3.4–9.6)
TRIGL SERPL-MCNC: 172 MG/DL — HIGH (ref 10–149)
TSH SERPL-MCNC: 2.32 UIU/ML — SIGNIFICANT CHANGE UP (ref 0.27–4.2)

## 2018-04-25 PROCEDURE — 99233 SBSQ HOSP IP/OBS HIGH 50: CPT

## 2018-04-25 PROCEDURE — 99223 1ST HOSP IP/OBS HIGH 75: CPT

## 2018-04-25 RX ORDER — WARFARIN SODIUM 2.5 MG/1
3 TABLET ORAL ONCE
Qty: 0 | Refills: 0 | Status: COMPLETED | OUTPATIENT
Start: 2018-04-25 | End: 2018-04-25

## 2018-04-25 RX ORDER — TAMSULOSIN HYDROCHLORIDE 0.4 MG/1
0.4 CAPSULE ORAL AT BEDTIME
Qty: 0 | Refills: 0 | Status: DISCONTINUED | OUTPATIENT
Start: 2018-04-25 | End: 2018-04-26

## 2018-04-25 RX ORDER — SODIUM CHLORIDE 0.65 %
1 AEROSOL, SPRAY (ML) NASAL
Qty: 0 | Refills: 0 | Status: DISCONTINUED | OUTPATIENT
Start: 2018-04-25 | End: 2018-05-08

## 2018-04-25 RX ADMIN — Medication 25 MILLIGRAM(S): at 05:10

## 2018-04-25 RX ADMIN — Medication 25 MILLIGRAM(S): at 17:09

## 2018-04-25 RX ADMIN — Medication 1 SPRAY(S): at 12:16

## 2018-04-25 RX ADMIN — Medication 80 MILLIGRAM(S): at 17:43

## 2018-04-25 RX ADMIN — TAMSULOSIN HYDROCHLORIDE 0.4 MILLIGRAM(S): 0.4 CAPSULE ORAL at 21:11

## 2018-04-25 RX ADMIN — Medication 80 MILLIGRAM(S): at 05:09

## 2018-04-25 RX ADMIN — WARFARIN SODIUM 3 MILLIGRAM(S): 2.5 TABLET ORAL at 21:11

## 2018-04-25 NOTE — CONSULT NOTE ADULT - ASSESSMENT
87 M with AF on A/C, mod AS on last echo presents with chest pain atypical and shortness of breath likely acute on chronic HFpEF  ·	Symptoms improving with diuresis. Would continue for now. Strict I and O. Crt is rising now 3. Likely acute on chronic renal disease.   ·	Repeat echo to assure no progression of AS or development of lv dysfunction.   ·	BP elevated. Resume outpatient dose of Metroprolol  ·	Patient with mild nose bleed with NC. If stable would continue with A/C. Goal INR 2-3.     =======================================================================  Hermelindo Magana MD [facility]    Please page [phone] with questions  On nights and weekend please call the office [phone].

## 2018-04-25 NOTE — PROGRESS NOTE ADULT - PROBLEM SELECTOR PLAN 3
/105 on admission,  Got Lasix IV and Hydralazine. SBP has been running in 155  - Increase Hydralazine 25mg tid, No AVE/ARB for CKD (baseline 2.5)

## 2018-04-25 NOTE — PHYSICAL THERAPY INITIAL EVALUATION ADULT - GENERAL OBSERVATIONS, REHAB EVAL
Pt rec'd OOB in chair, NAD. + faby. On room air, c/o nose bleeding all day 2/2 O2 NC. Pt satting in 90s throughout session on room air, per tele tech.

## 2018-04-25 NOTE — PROGRESS NOTE ADULT - SUBJECTIVE AND OBJECTIVE BOX
[facility]   [phone]  RENAL FOLLOW UP NOTE  --------------------------------------------------------------------------------  Chief Complaint:    24 hour events/subjective:    states feels better. no new c/o are offered     PAST HISTORY  --------------------------------------------------------------------------------  No significant changes to PMH, PSH, FHx, SHx, unless otherwise noted    ALLERGIES & MEDICATIONS  --------------------------------------------------------------------------------  Allergies    No Known Allergies    Intolerances      Standing Inpatient Medications  furosemide   Injectable 80 milliGRAM(s) IV Push two times a day  hydrALAZINE 25 milliGRAM(s) Oral every 12 hours  tamsulosin 0.4 milliGRAM(s) Oral at bedtime  warfarin 3 milliGRAM(s) Oral once    PRN Inpatient Medications  sodium chloride 0.65% Nasal 1 Spray(s) Both Nostrils every 2 hours PRN      REVIEW OF SYSTEMS  --------------------------------------------------------------------------------    Gen: denies  fevers/chills,  CVS: denies chest pain/palpitations  Resp: denies worsening SOB/Cough  GI: Denies N/V/Abd pain  : Denies dysuria/oliguria/hematuria  states still with edema    All other systems were reviewed and are negative, except as noted.    VITALS/PHYSICAL EXAM  --------------------------------------------------------------------------------  T(C): 36.3 (04-25-18 @ 15:00), Max: 37.1 (04-25-18 @ 04:31)  HR: 88 (04-25-18 @ 16:44) (68 - 97)  BP: 138/71 (04-25-18 @ 16:44) (135/68 - 155/73)  RR: 18 (04-25-18 @ 15:00) (18 - 19)  SpO2: 93% (04-25-18 @ 15:00) (93% - 98%)  Wt(kg): --  Height (cm): 177.8 (04-24-18 @ 02:30)  Weight (kg): 79.7 (04-24-18 @ 02:30)  BMI (kg/m2): 25.2 (04-24-18 @ 02:30)  BSA (m2): 1.98 (04-24-18 @ 02:30)      04-24-18 @ 07:01  -  04-25-18 @ 07:00  --------------------------------------------------------  IN: 960 mL / OUT: 1250 mL / NET: -290 mL    04-25-18 @ 07:01  -  04-25-18 @ 20:21  --------------------------------------------------------  IN: 360 mL / OUT: 1000 mL / NET: -640 mL      Physical Exam:  	  + jvd , supple neck,   	Pulm: decrease bs  no rales or ronchi or wheezing  	CV: RRR, S1S2; no rub  	Back: No CVA tenderness; no sacral edema  	Abd: +BS, soft, nontender/nondistended  	: No suprapubic tenderness  	UE: Warm, no cyanosis  no clubbing,  no edema  	LE: Warm, no cyanosis  no clubbing, 2+ edema    LABS/STUDIES	  --------------------------------------------------------------------------------              10.4   7.78  >-----------<  274      [04-24-18 @ 07:51]              33.3     145  |  107  |  83  ----------------------------<  128      [04-25-18 @ 06:07]  4.3   |  25  |  3.02        Ca     8.2     [04-25-18 @ 06:07]      Mg     1.9     [04-24-18 @ 06:48]      Phos  3.9     [04-24-18 @ 06:48]    TPro  x   /  Alb  3.5  /  TBili  x   /  DBili  x   /  AST  x   /  ALT  x   /  AlkPhos  x   [04-24-18 @ 19:22]    PT/INR: PT 21.6 , INR 1.95       [04-25-18 @ 06:00]  PTT: 39.0       [04-24-18 @ 07:51]    Troponin 0.05      [04-24-18 @ 06:48]  CK 27      [04-24-18 @ 06:48]  Serum Osmolality 322      [04-24-18 @ 06:48]    Creatinine Trend:  SCr 3.02 [04-25 @ 06:07]  SCr 3.00 [04-24 @ 06:48]  SCr 2.88 [04-23 @ 21:59]      Urinalysis - [04-24-18 @ 08:24]      Color Yellow / Appearance Clear / SG 1.012 / pH 5.5      Gluc Negative / Ketone Negative  / Bili Negative / Urobili Negative       Blood Negative / Protein 300 / Leuk Est Negative / Nitrite Negative      RBC 0-2 / WBC 0-2 / Hyaline  / Gran  / Sq Epi  / Non Sq Epi  / Bacteria Few    Urine Creatinine 59      [04-24-18 @ 19:19]  Urine Protein 248      [04-24-18 @ 19:19]  Urine Sodium 62      [04-24-18 @ 06:48]  Urine Urea Nitrogen 463      [04-24-18 @ 07:54]  Urine Osmolality 376      [04-24-18 @ 06:48]    HbA1c 5.5      [04-25-18 @ 07:44]  TSH 2.32      [04-25-18 @ 07:53]  Lipid: chol 135, , HDL 25, LDL 76      [04-25-18 @ 07:55]    C3 Complement 86      [04-25-18 @ 08:03]  C4 Complement 28      [04-25-18 @ 08:03]  Rheumatoid Factor <7      [04-25-18 @ 07:55]  SPEP Interpretation: Normal Electrophoresis Pattern      [04-24-18 @ 19:22]

## 2018-04-25 NOTE — PHYSICAL THERAPY INITIAL EVALUATION ADULT - DIAGNOSIS, PT EVAL
Decreased dynamic balance and endurance impacting functional mobility independence and activity tolerance

## 2018-04-25 NOTE — PROGRESS NOTE ADULT - ASSESSMENT
87 y.o male w/ pmhx a.fib, diastolic CHF (last echo noted 4/2015: LV 60%, severe pulm. htn, mod. AS), HTN presenting w/ c/o of CP x 1 day with sob/chf as well as htn urgency    1- CKD IV suspected and progressive in nature given hx of baseline cr 2.56 in 2016 as documented  2- HTN   3- CHF  4- nephrotic syndrome     serologies pending but pt also with ct scan chest with granulomas. to have c-anca/p-anca MPO and anti-proteinase 3 assay   hydralazine to cont for now.  will need to change to arb soon given proteinuria   cont with lasix 80 mg iv bid  keep O> I

## 2018-04-25 NOTE — CONSULT NOTE ADULT - SUBJECTIVE AND OBJECTIVE BOX
Cardiology Consult  Faculty Cardiology  ==========================================================================    Chief Complaint:     HPI:  87 y.o male w/ pmhx a.fib, diastolic CHF (last echo noted 2015: LV 60%, severe pulm. htn, mod. AS), HTN presenting w/ c/o of CP x 1 day. Patient states he had sub-sternal sharp CP which started around 6pm on day of admission. CP was intermittent, non-radiating, no numbness/tingling, diaphoresis, palpitations noted.  He states he has never had this happen before. Associated symptoms include SOB x 2 days and decreased urination x 2months. He states he feel SOB when walking a couple feet and especially feels SOB when he is trying to have a conversation. He has also noted decreased urination in the last two months. Denies fevers/chills, cough, abdominal pain, changes in bowel habits, dysuria, incontinence. All other ROS is negative. No recent changes in medications, or drastic changes in diet. No recent sick contacts. Last saw cardiologist ([phone]) as per daughter six months ago, may have had an echo at that time, daughter unsure. Daughter and patient also state that patient used to have type II DM, used to be on insulin, and his sugars have been controlled, and he has been off medications for two years. He also used to follow a nephrologist, does not recall name, last saw nephrologist over two years ago as per patient.   Of note, allscripts as data from , patient's prior cardiologist-Dr. Praveen Henderson  His notes indicate patient has chronic diastolic HF ,severe pulm HTn, mod AS. Had echo 2015-normal systolic function, LVEF: 60%  Labs from 2016 Cr. 2.56    87 M with PMH as stated presents with chest pain and SOB. Symptoms present for one day. He describes the pain as sharp. No radiation. It is not clearly exertional. It is associated with SOB. It resolves on his own. He also notes increasing SOB independent of the chest pain. He notes it with exertion and now occurs with less exertion. He has AF on A/C with warfarin. He has h/o HFpEF which appears decompensated at this time. HTN with BP that is elevated in the hospital.     PMH:   Congestive heart failure  HTN (hypertension)  Atrial fibrillation    PSH:   No significant past surgical history    Medications:   furosemide   Injectable 80 milliGRAM(s) IV Push two times a day  hydrALAZINE 25 milliGRAM(s) Oral every 12 hours    Allergies:  No Known Allergies    FAMILY HISTORY:  No pertinent family history in first degree relatives    Social History:  Smoking: No active  Alcohol:  Drugs:    Review of Systems:  Constitutional: [ ] Fever [ ] Chills [ ] Fatigue [ ] Weight change   HEENT: [ ] Blurred vision [ ] Eye Pain [ ] Headache [ ] Runny nose [ ] Sore Throat   Respiratory: [ ] Cough [ ] Wheezing [ ] Shortness of breath  Cardiovascular: [ ] Chest Pain [ ] Palpitations [ ] CHACON [ ] PND [ ] Orthopnea  Gastrointestinal: [ ] Abdominal Pain [ ] Diarrhea [ ] Constipation [ ] Hemorrhoids [ ] Nausea [ ] Vomiting  Genitourinary: [ ] Nocturia [ ] Dysuria [ ] Incontinence  Extremities: [ ] Swelling [ ] Joint Pain  Neurologic: [ ] Focal deficit [ ] Paresthesias [ ] Syncope  Lymphatic: [ ] Swelling [ ] Lymphadenopathy   Skin: [ ] Rash [ ] Ecchymoses [ ] Wounds [ ] Lesions  Psychiatry: [ ] Depression [ ] Suicidal/Homicidal Ideation [ ] Anxiety [ ] Sleep Disturbances  [X ] 10 point review of systems is otherwise negative except as mentioned above            [ ]Unable to obtain    Physical Exam:  T(C): 37.1 (18 @ 04:31), Max: 37.1 (18 @ 04:31)  HR: 93 (18 @ 04:31) (77 - 97)  BP: 151/80 (18 @ 04:31) (147/76 - 182/96)  RR: 18 (18 @ 04:31) (18 - 20)  SpO2: 93% (18 @ 04:31) (93% - 98%)  Wt(kg): --     @ 07:01  -   @ 07:00  --------------------------------------------------------  IN: 960 mL / OUT: 1250 mL / NET: -290 mL      Daily     Daily Weight in k (2018 04:31)    Appearance: [x ] Normal [x ] NAD  Eyes: [x ] PERRL [x ] EOMI  HENT: [x ] Normal oral muscosa [x ]NC/AT  Neck: [x] Supple [x] FROM [x] No JVD  Cardiovascular: [x ] S1, S2 [x ] RRR [x ] No m/r/g [x ]No edema  Respiratory: [x ] Clear to auscultation bilaterally [x ] Normal Effort  Gastrointestinal: [x ] Soft [x ] Non-tender [x ] Non-distended [x ] BS+  Musculoskeletal: [x ] No clubbing [x] No joint deformity   Neurologic: [x ] Non-focal  Lymphatic: [x ] No lymphadenopathy  Psychiatry: [x ] AAOx3 [x ] Mood & affect appropriate  Skin: [ x] No rashes [ x] No ecchymoses [x ] No cyanosis [x ] warm and dry    Procedural Access Site: [ ] No hematoma [ ] Non-tender to palpation [ ] 2+ pulse [ ] No bruit [ ] No Ecchymosis    Cardiovascular Diagnostic Testing:  ECG: AF @ 80, LAD. NS-ST-T changes    Echo:     Stress Testing:    Cath:     Interpretation of Telemetry:    Imaging: < from: CT Chest No Cont (18 @ 17:06) >  INTERPRETATION:  Bilateral pleural effusions, right greater than left,   with adjacent passive atelectasis.  Cardiomegaly.    < end of copied text >      Labs:                        10.4   7.78  )-----------( 274      ( 2018 07:51 )             33.3     04-25    145  |  107  |  83<H>  ----------------------------<  128<H>  4.3   |  25  |  3.02<H>    Ca    8.2<L>      2018 06:07  Phos  3.9     04-24  Mg     1.9     04-24    TPro  5.6<L>  /  Alb  3.1<L>  /  TBili  0.4  /  DBili  x   /  AST  16  /  ALT  26  /  AlkPhos  86      PT/INR - ( 2018 06:00 )   PT: 21.6 sec;   INR: 1.95 ratio         PTT - ( 2018 07:51 )  PTT:39.0 sec  CARDIAC MARKERS ( 2018 06:48 )  x     / 0.05 ng/mL / 27 U/L / x     / 1.8 ng/mL  CARDIAC MARKERS ( 2018 21:59 )  x     / 0.04 ng/mL / 47 U/L / x     / 3.0 ng/mL      Serum Pro-Brain Natriuretic Peptide: [id number] pg/mL ( @ 21:59)        Thyroid Stimulating Hormone, Serum: 2.32 uIU/mL ( @ 07:53) Cardiology Consult  Faculty Cardiology  ==========================================================================    Chief Complaint:     HPI:  87 y.o male w/ pmhx a.fib, diastolic CHF (last echo noted 2015: LV 60%, severe pulm. htn, mod. AS), HTN presenting w/ c/o of CP x 1 day. Patient states he had sub-sternal sharp CP which started around 6pm on day of admission. CP was intermittent, non-radiating, no numbness/tingling, diaphoresis, palpitations noted.  He states he has never had this happen before. Associated symptoms include SOB x 2 days and decreased urination x 2months. He states he feel SOB when walking a couple feet and especially feels SOB when he is trying to have a conversation. He has also noted decreased urination in the last two months. Denies fevers/chills, cough, abdominal pain, changes in bowel habits, dysuria, incontinence. All other ROS is negative. No recent changes in medications, or drastic changes in diet. No recent sick contacts. Last saw cardiologist ([phone]) as per daughter six months ago, may have had an echo at that time, daughter unsure. Daughter and patient also state that patient used to have type II DM, used to be on insulin, and his sugars have been controlled, and he has been off medications for two years. He also used to follow a nephrologist, does not recall name, last saw nephrologist over two years ago as per patient.   Of note, allscripts as data from , patient's prior cardiologist-Dr. Praveen Henderson  His notes indicate patient has chronic diastolic HF ,severe pulm HTn, mod AS. Had echo 2015-normal systolic function, LVEF: 60%  Labs from 2016 Cr. 2.56    87 M with PMH as stated presents with chest pain and SOB. Symptoms present for one day. He describes the pain as sharp. No radiation. It is not clearly exertional. It is associated with SOB. It resolves on his own. He also notes increasing SOB independent of the chest pain. He notes it with exertion and now occurs with less exertion. He has AF on A/C with warfarin. He has h/o HFpEF which appears decompensated at this time. HTN with BP that is elevated in the hospital.     PMH:   Congestive heart failure  HTN (hypertension)  Atrial fibrillation    PSH:   No significant past surgical history    Medications:   furosemide   Injectable 80 milliGRAM(s) IV Push two times a day  hydrALAZINE 25 milliGRAM(s) Oral every 12 hours    Allergies:  No Known Allergies    FAMILY HISTORY:  No pertinent family history in first degree relatives    Social History:  Smoking: No active  Alcohol:  Drugs:    Review of Systems:  Constitutional: [ ] Fever [ ] Chills [ ] Fatigue [ ] Weight change   HEENT: [ ] Blurred vision [ ] Eye Pain [ ] Headache [ ] Runny nose [ ] Sore Throat   Respiratory: [ ] Cough [ ] Wheezing [ ] Shortness of breath  Cardiovascular: [ ] Chest Pain [ ] Palpitations [ ] CHACON [ ] PND [ ] Orthopnea  Gastrointestinal: [ ] Abdominal Pain [ ] Diarrhea [ ] Constipation [ ] Hemorrhoids [ ] Nausea [ ] Vomiting  Genitourinary: [ ] Nocturia [ ] Dysuria [ ] Incontinence  Extremities: [ ] Swelling [ ] Joint Pain  Neurologic: [ ] Focal deficit [ ] Paresthesias [ ] Syncope  Lymphatic: [ ] Swelling [ ] Lymphadenopathy   Skin: [ ] Rash [ ] Ecchymoses [ ] Wounds [ ] Lesions  Psychiatry: [ ] Depression [ ] Suicidal/Homicidal Ideation [ ] Anxiety [ ] Sleep Disturbances  [X ] 10 point review of systems is otherwise negative except as mentioned above            [ ]Unable to obtain    Physical Exam:  T(C): 37.1 (18 @ 04:31), Max: 37.1 (18 @ 04:31)  HR: 93 (18 @ 04:31) (77 - 97)  BP: 151/80 (18 @ 04:31) (147/76 - 182/96)  RR: 18 (18 @ 04:31) (18 - 20)  SpO2: 93% (18 @ 04:31) (93% - 98%)  Wt(kg): --     @ 07:01  -   @ 07:00  --------------------------------------------------------  IN: 960 mL / OUT: 1250 mL / NET: -290 mL      Daily     Daily Weight in k (2018 04:31)    Appearance: [x ] Normal [x ] NAD  Eyes: [x ] PERRL [x ] EOMI  HENT: [x ] Normal oral muscosa [x ]NC/AT  Neck: [x] Supple [x] FROM [x] No JVD  Cardiovascular: [x ] S1, S2 [x ] IRIR [x ] 3/6 BRANNON [x ]No edema  Respiratory: [x ] decreased at bases.  [x ] Normal Effort  Gastrointestinal: [x ] Soft [x ] Non-tender [x ] Non-distended [x ] BS+  Musculoskeletal: [x ] No clubbing [x] No joint deformity   Neurologic: [x ] Non-focal  Lymphatic: [x ] No lymphadenopathy  Psychiatry: [x ] AAOx3 [x ] Mood & affect appropriate  Skin: [ x] No rashes [ x] No ecchymoses [x ] No cyanosis [x ] warm and dry        Cardiovascular Diagnostic Testing:  ECG: AF @ 80, LAD. NS-ST-T changes    Echo:     Stress Testing:    Cath:     Interpretation of Telemetry:    Imaging: < from: CT Chest No Cont (18 @ 17:06) >  INTERPRETATION:  Bilateral pleural effusions, right greater than left,   with adjacent passive atelectasis.  Cardiomegaly.    < end of copied text >      Labs:                        10.4   7.78  )-----------( 274      ( 2018 07:51 )             33.3     04-25    145  |  107  |  83<H>  ----------------------------<  128<H>  4.3   |  25  |  3.02<H>    Ca    8.2<L>      2018 06:07  Phos  3.9     04-24  Mg     1.9     04-24    TPro  5.6<L>  /  Alb  3.1<L>  /  TBili  0.4  /  DBili  x   /  AST  16  /  ALT  26  /  AlkPhos  86      PT/INR - ( 2018 06:00 )   PT: 21.6 sec;   INR: 1.95 ratio         PTT - ( 2018 07:51 )  PTT:39.0 sec  CARDIAC MARKERS ( 2018 06:48 )  x     / 0.05 ng/mL / 27 U/L / x     / 1.8 ng/mL  CARDIAC MARKERS ( 2018 21:59 )  x     / 0.04 ng/mL / 47 U/L / x     / 3.0 ng/mL      Serum Pro-Brain Natriuretic Peptide: 63647 pg/mL ( @ 21:59)        Thyroid Stimulating Hormone, Serum: 2.32 uIU/mL ( @ 07:53)

## 2018-04-25 NOTE — PROGRESS NOTE ADULT - SUBJECTIVE AND OBJECTIVE BOX
Patient is a 87y old  Male who presents with a chief complaint of Cheast pain and SOB.    SUBJECTIVE / OVERNIGHT EVENTS:  Sitting in chair, no acute distress, O2 sat 95% in RA. Tele- A.fib 98      MEDICATIONS  (STANDING):  furosemide   Injectable 80 milliGRAM(s) IV Push two times a day  hydrALAZINE 25 milliGRAM(s) Oral every 12 hours  tamsulosin 0.4 milliGRAM(s) Oral at bedtime  warfarin 3 milliGRAM(s) Oral once    MEDICATIONS  (PRN):  sodium chloride 0.65% Nasal 1 Spray(s) Both Nostrils every 2 hours PRN nasal dryness      Vital Signs Last 24 Hrs  T(C): 36.7 (2018 12:02), Max: 37.1 (2018 04:31)  T(F): 98.1 (2018 12:02), Max: 98.7 (2018 04:31)  HR: 97 (2018 12:02) (83 - 97)  BP: 155/73 (2018 12:02) (147/76 - 182/96)  BP(mean): --  RR: 18 (2018 12:02) (18 - 19)  SpO2: 94% (2018 12:02) (93% - 98%)  CAPILLARY BLOOD GLUCOSE        I&O's Summary    2018 07:  -  2018 07:00  --------------------------------------------------------  IN: 960 mL / OUT: 1250 mL / NET: -290 mL    2018 07:  -  2018 14:55  --------------------------------------------------------  IN: 360 mL / OUT: 1000 mL / NET: -640 mL        PHYSICAL EXAM:-  GENERAL: NAD, well-developed  EYES: EOMI, PERRLA, conjunctiva and sclera clear  NECK: Supple, No JVD, no thyromegaly  CHEST/LUNG: Clear to auscultation bilaterally; No wheeze  HEART: Regular rate and rhythm; S1, S2 audible, No murmurs, rubs, or gallops  ABDOMEN: Soft, Nontender, Nondistended; Bowel sounds present  EXTREMITIES:  2+ Peripheral Pulses, No clubbing, cyanosis, or edema  NEURO: AAOx3, no focal deficit      LABS:                        10.4   7.78  )-----------( 274      ( 2018 07:51 )             33.3     -    145  |  107  |  83<H>  ----------------------------<  128<H>  4.3   |  25  |  3.02<H>    Ca    8.2<L>      2018 06:07  Phos  3.9     -24  Mg     1.9     24    TPro  x   /  Alb  3.5<L>  /  TBili  x   /  DBili  x   /  AST  x   /  ALT  x   /  AlkPhos  x   24    PT/INR - ( 2018 06:00 )   PT: 21.6 sec;   INR: 1.95 ratio         PTT - ( 2018 07:51 )  PTT:39.0 sec  CARDIAC MARKERS ( 2018 06:48 )  x     / 0.05 ng/mL / 27 U/L / x     / 1.8 ng/mL  CARDIAC MARKERS ( 2018 21:59 )  x     / 0.04 ng/mL / 47 U/L / x     / 3.0 ng/mL      Urinalysis Basic - ( 2018 08:24 )    Color: Yellow / Appearance: Clear / S.012 / pH: x  Gluc: x / Ketone: Negative  / Bili: Negative / Urobili: Negative   Blood: x / Protein: 300 mg/dL / Nitrite: Negative   Leuk Esterase: Negative / RBC: 0-2 /HPF / WBC 0-2 /HPF   Sq Epi: x / Non Sq Epi: x / Bacteria: Few /HPF      RADIOLOGY & ADDITIONAL TESTS:    Imaging Personally Reviewed: CXR, Old Echo  Consultant(s) Notes Reviewed:  Card, Renal  Care Discussed with Consultants/Other Providers: card, Renal

## 2018-04-25 NOTE — PROGRESS NOTE ADULT - PROBLEM SELECTOR PLAN 1
Reviewed Echo last year (outpatient) EF 50-65%  The patient has been breathing better on IV Lasix 80mg q 12hrs, Afebrile,   - Cardiology consult appreciated  - c/w IV lasix 40mg q 12hrs, Metoprolol, hydrlazine  - daily I/O and weight

## 2018-04-25 NOTE — PHYSICAL THERAPY INITIAL EVALUATION ADULT - ADDITIONAL COMMENTS
Pt lives alone in private home, 2 DEAN no rail. PTA, independent with SPC. Denies owning any other DME. Tub shower, + grab bars.

## 2018-04-25 NOTE — PHYSICAL THERAPY INITIAL EVALUATION ADULT - PLANNED THERAPY INTERVENTIONS, PT EVAL
gait training/Stair Training Goal: Pt will asc/desc 2 steps x independent with SPC in 3 weeks./transfer training/balance training/bed mobility training

## 2018-04-25 NOTE — PROGRESS NOTE ADULT - PROBLEM SELECTOR PLAN 4
-Cr. 2.88 (had a Cr. of 2.56 in 1/2016 as per [employer]), patient may be in ortiz on ckd 2/2 acute diastolic HF 2/2 HTN urgency  - Neil placed, Renal US ordered  - Renal consult appreciated from Dr Hema Bob   He is anemia, will sent C3, C4, hepatitis panel, SPEP, UPEP, protein: Creatitine

## 2018-04-25 NOTE — PHYSICAL THERAPY INITIAL EVALUATION ADULT - BALANCE TRAINING, PT EVAL
Goal: Pt's standing balance will improve, decreasing fall risk and increasing independence with functional mobility in 3 weeks.

## 2018-04-25 NOTE — PHYSICAL THERAPY INITIAL EVALUATION ADULT - PERTINENT HX OF CURRENT PROBLEM, REHAB EVAL
87y M admitted 4/23 for chest pain and SOB 2/2 hypoxic respiratory failure, acute on chronic CHF exacerbation, and TRAVIS on CKD. CXR/POCUS (+) for B pleural effusions.

## 2018-04-25 NOTE — PHYSICAL THERAPY INITIAL EVALUATION ADULT - PATIENT/FAMILY AGREES WITH PLAN
PT recommends outpatient PT for balance, endurance, and gait/stair training. Pt states he "will think about it" but likely does not want therapy post d/c./no

## 2018-04-26 DIAGNOSIS — I48.91 UNSPECIFIED ATRIAL FIBRILLATION: ICD-10-CM

## 2018-04-26 DIAGNOSIS — E11.9 TYPE 2 DIABETES MELLITUS WITHOUT COMPLICATIONS: ICD-10-CM

## 2018-04-26 DIAGNOSIS — I10 ESSENTIAL (PRIMARY) HYPERTENSION: ICD-10-CM

## 2018-04-26 DIAGNOSIS — R80.9 PROTEINURIA, UNSPECIFIED: ICD-10-CM

## 2018-04-26 DIAGNOSIS — R33.8 OTHER RETENTION OF URINE: ICD-10-CM

## 2018-04-26 LAB
ANA TITR SER: NEGATIVE — SIGNIFICANT CHANGE UP
ANION GAP SERPL CALC-SCNC: 12 MMOL/L — SIGNIFICANT CHANGE UP (ref 5–17)
BASOPHILS # BLD AUTO: 0 K/UL — SIGNIFICANT CHANGE UP (ref 0–0.2)
BASOPHILS NFR BLD AUTO: 0.1 % — SIGNIFICANT CHANGE UP (ref 0–2)
BUN SERPL-MCNC: 82 MG/DL — HIGH (ref 7–23)
CALCIUM SERPL-MCNC: 8.2 MG/DL — LOW (ref 8.4–10.5)
CHLORIDE SERPL-SCNC: 106 MMOL/L — SIGNIFICANT CHANGE UP (ref 96–108)
CO2 SERPL-SCNC: 24 MMOL/L — SIGNIFICANT CHANGE UP (ref 22–31)
CREAT SERPL-MCNC: 2.97 MG/DL — HIGH (ref 0.5–1.3)
CREATININE, URINE RESULT: 41 MG/DL — SIGNIFICANT CHANGE UP
EOSINOPHIL # BLD AUTO: 0.1 K/UL — SIGNIFICANT CHANGE UP (ref 0–0.5)
EOSINOPHIL NFR BLD AUTO: 1 % — SIGNIFICANT CHANGE UP (ref 0–6)
GLUCOSE SERPL-MCNC: 130 MG/DL — HIGH (ref 70–99)
HCT VFR BLD CALC: 29.8 % — LOW (ref 39–50)
HGB BLD-MCNC: 10 G/DL — LOW (ref 13–17)
INR BLD: 2.14 RATIO — HIGH (ref 0.88–1.16)
LYMPHOCYTES # BLD AUTO: 1 K/UL — SIGNIFICANT CHANGE UP (ref 1–3.3)
LYMPHOCYTES # BLD AUTO: 14.9 % — SIGNIFICANT CHANGE UP (ref 13–44)
MCHC RBC-ENTMCNC: 29.9 PG — SIGNIFICANT CHANGE UP (ref 27–34)
MCHC RBC-ENTMCNC: 33.5 GM/DL — SIGNIFICANT CHANGE UP (ref 32–36)
MCV RBC AUTO: 89.2 FL — SIGNIFICANT CHANGE UP (ref 80–100)
MONOCYTES # BLD AUTO: 0.7 K/UL — SIGNIFICANT CHANGE UP (ref 0–0.9)
MONOCYTES NFR BLD AUTO: 9.5 % — SIGNIFICANT CHANGE UP (ref 2–14)
MYELOPEROXIDASE AB SER-ACNC: <5 UNITS — SIGNIFICANT CHANGE UP
MYELOPEROXIDASE CELLS FLD QL: NEGATIVE — SIGNIFICANT CHANGE UP
NEUTROPHILS # BLD AUTO: 5.2 K/UL — SIGNIFICANT CHANGE UP (ref 1.8–7.4)
NEUTROPHILS NFR BLD AUTO: 74.5 % — SIGNIFICANT CHANGE UP (ref 43–77)
PLATELET # BLD AUTO: 227 K/UL — SIGNIFICANT CHANGE UP (ref 150–400)
POTASSIUM SERPL-MCNC: 4.1 MMOL/L — SIGNIFICANT CHANGE UP (ref 3.5–5.3)
POTASSIUM SERPL-SCNC: 4.1 MMOL/L — SIGNIFICANT CHANGE UP (ref 3.5–5.3)
PROT ?TM UR-MCNC: 174 MG/DL — HIGH (ref 0–12)
PROTEINASE3 AB FLD-ACNC: <5 UNITS — SIGNIFICANT CHANGE UP
PROTEINASE3 AB SER-ACNC: NEGATIVE — SIGNIFICANT CHANGE UP
PROTHROM AB SERPL-ACNC: 23.5 SEC — HIGH (ref 9.8–12.7)
RBC # BLD: 3.34 M/UL — LOW (ref 4.2–5.8)
RBC # FLD: 16 % — HIGH (ref 10.3–14.5)
SODIUM SERPL-SCNC: 142 MMOL/L — SIGNIFICANT CHANGE UP (ref 135–145)
WBC # BLD: 7 K/UL — SIGNIFICANT CHANGE UP (ref 3.8–10.5)
WBC # FLD AUTO: 7 K/UL — SIGNIFICANT CHANGE UP (ref 3.8–10.5)

## 2018-04-26 PROCEDURE — 99233 SBSQ HOSP IP/OBS HIGH 50: CPT

## 2018-04-26 PROCEDURE — 99232 SBSQ HOSP IP/OBS MODERATE 35: CPT

## 2018-04-26 PROCEDURE — 93306 TTE W/DOPPLER COMPLETE: CPT | Mod: 26

## 2018-04-26 RX ORDER — METOPROLOL TARTRATE 50 MG
100 TABLET ORAL DAILY
Qty: 0 | Refills: 0 | Status: DISCONTINUED | OUTPATIENT
Start: 2018-04-26 | End: 2018-04-26

## 2018-04-26 RX ORDER — METOPROLOL TARTRATE 50 MG
25 TABLET ORAL
Qty: 0 | Refills: 0 | Status: DISCONTINUED | OUTPATIENT
Start: 2018-04-27 | End: 2018-04-27

## 2018-04-26 RX ORDER — SENNA PLUS 8.6 MG/1
2 TABLET ORAL AT BEDTIME
Qty: 0 | Refills: 0 | Status: DISCONTINUED | OUTPATIENT
Start: 2018-04-26 | End: 2018-05-08

## 2018-04-26 RX ORDER — WARFARIN SODIUM 2.5 MG/1
2.5 TABLET ORAL ONCE
Qty: 0 | Refills: 0 | Status: COMPLETED | OUTPATIENT
Start: 2018-04-26 | End: 2018-04-26

## 2018-04-26 RX ORDER — DOCUSATE SODIUM 100 MG
100 CAPSULE ORAL THREE TIMES A DAY
Qty: 0 | Refills: 0 | Status: DISCONTINUED | OUTPATIENT
Start: 2018-04-26 | End: 2018-05-08

## 2018-04-26 RX ORDER — FINASTERIDE 5 MG/1
5 TABLET, FILM COATED ORAL DAILY
Qty: 0 | Refills: 0 | Status: DISCONTINUED | OUTPATIENT
Start: 2018-04-26 | End: 2018-05-08

## 2018-04-26 RX ADMIN — Medication 1 SPRAY(S): at 05:30

## 2018-04-26 RX ADMIN — FINASTERIDE 5 MILLIGRAM(S): 5 TABLET, FILM COATED ORAL at 18:33

## 2018-04-26 RX ADMIN — Medication 80 MILLIGRAM(S): at 18:30

## 2018-04-26 RX ADMIN — Medication 100 MILLIGRAM(S): at 18:33

## 2018-04-26 RX ADMIN — Medication 80 MILLIGRAM(S): at 05:29

## 2018-04-26 RX ADMIN — Medication 25 MILLIGRAM(S): at 18:41

## 2018-04-26 RX ADMIN — WARFARIN SODIUM 2.5 MILLIGRAM(S): 2.5 TABLET ORAL at 21:14

## 2018-04-26 RX ADMIN — SENNA PLUS 2 TABLET(S): 8.6 TABLET ORAL at 21:14

## 2018-04-26 RX ADMIN — Medication 100 MILLIGRAM(S): at 11:04

## 2018-04-26 RX ADMIN — Medication 100 MILLIGRAM(S): at 21:14

## 2018-04-26 RX ADMIN — Medication 25 MILLIGRAM(S): at 05:29

## 2018-04-26 NOTE — PROGRESS NOTE ADULT - SUBJECTIVE AND OBJECTIVE BOX
Cardiology Follow Up  ==========================================  CC:     HPI:    Review Of Systems:  Negative except as documented above    Medications:    furosemide   Injectable   80 milliGRAM(s) IV Push (04-26-18 @ 05:29)   80 milliGRAM(s) IV Push (04-25-18 @ 17:43)    hydrALAZINE   25 milliGRAM(s) Oral (04-26-18 @ 05:29)   25 milliGRAM(s) Oral (04-25-18 @ 17:09)    metoprolol succinate ER   100 milliGRAM(s) Oral (04-26-18 @ 11:04)    sodium chloride 0.65% Nasal   1 Spray(s) Both Nostrils (04-26-18 @ 05:30)    tamsulosin   0.4 milliGRAM(s) Oral (04-25-18 @ 21:11)    warfarin   3 milliGRAM(s) Oral (04-25-18 @ 21:11)        Telemetry:     Vital Signs Last 24 Hrs  T(C): 36.5 (26 Apr 2018 12:08), Max: 37 (25 Apr 2018 21:39)  T(F): 97.7 (26 Apr 2018 12:08), Max: 98.6 (25 Apr 2018 21:39)  HR: 97 (26 Apr 2018 12:08) (68 - 108)  BP: 128/61 (26 Apr 2018 12:08) (128/61 - 149/92)  BP(mean): --  RR: 18 (26 Apr 2018 12:08) (18 - 18)  SpO2: 96% (26 Apr 2018 12:08) (92% - 98%)  I&O's Summary    25 Apr 2018 07:01  -  26 Apr 2018 07:00  --------------------------------------------------------  IN: 1340 mL / OUT: 1400 mL / NET: -60 mL    26 Apr 2018 07:01  -  26 Apr 2018 13:09  --------------------------------------------------------  IN: 180 mL / OUT: 0 mL / NET: 180 mL        Physical Exam:  General: [x ] NAD  HEENT: [x ] EOMI [ x] PERRL  Cor: [x ] S1,S2 [x ] RRR [x ] No M/R/G   Lungs: [x ] CTA B/L [ x] Normal effort  Abd: [x ] Soft [x ] Non-tender [x ] +BS  Ext: [ x] No edema [x ] No cyanosis    Labs:                        10.0   7.0   )-----------( 227      ( 26 Apr 2018 06:21 )             29.8     04-26    142  |  106  |  82<H>  ----------------------------<  130<H>  4.1   |  24  |  2.97<H>    Ca    8.2<L>      26 Apr 2018 06:21    TPro  x   /  Alb  3.5<L>  /  TBili  x   /  DBili  x   /  AST  x   /  ALT  x   /  AlkPhos  x   04-24 Cardiology Follow Up  ==========================================  CC: Acute on chronic HFpEF, chest pain.     HPI: Patient states he feels better today. No chest pain. Breathing improved. Still with mild nose bleed.     Review Of Systems:  Negative except as documented above    Medications:    furosemide   Injectable   80 milliGRAM(s) IV Push (04-26-18 @ 05:29)   80 milliGRAM(s) IV Push (04-25-18 @ 17:43)    hydrALAZINE   25 milliGRAM(s) Oral (04-26-18 @ 05:29)   25 milliGRAM(s) Oral (04-25-18 @ 17:09)    metoprolol succinate ER   100 milliGRAM(s) Oral (04-26-18 @ 11:04)    sodium chloride 0.65% Nasal   1 Spray(s) Both Nostrils (04-26-18 @ 05:30)    tamsulosin   0.4 milliGRAM(s) Oral (04-25-18 @ 21:11)    warfarin   3 milliGRAM(s) Oral (04-25-18 @ 21:11)        Telemetry: 17 bts WCT    Vital Signs Last 24 Hrs  T(C): 36.5 (26 Apr 2018 12:08), Max: 37 (25 Apr 2018 21:39)  T(F): 97.7 (26 Apr 2018 12:08), Max: 98.6 (25 Apr 2018 21:39)  HR: 97 (26 Apr 2018 12:08) (68 - 108)  BP: 128/61 (26 Apr 2018 12:08) (128/61 - 149/92)  BP(mean): --  RR: 18 (26 Apr 2018 12:08) (18 - 18)  SpO2: 96% (26 Apr 2018 12:08) (92% - 98%)  I&O's Summary    25 Apr 2018 07:01  -  26 Apr 2018 07:00  --------------------------------------------------------  IN: 1340 mL / OUT: 1400 mL / NET: -60 mL    26 Apr 2018 07:01  -  26 Apr 2018 13:09  --------------------------------------------------------  IN: 180 mL / OUT: 0 mL / NET: 180 mL        Physical Exam:  General: [x ] NAD  HEENT: [x ] EOMI [ x] PERRL  Cor: [x ] S1,S2 [x ] IRIR [x ] No M/R/G   Lungs: [x ] CTA B/L [ x] Normal effort  Abd: [x ] Soft [x ] Non-tender [x ] +BS  Ext: [ x] No edema [x ] No cyanosis    Labs:                        10.0   7.0   )-----------( 227      ( 26 Apr 2018 06:21 )             29.8     04-26    142  |  106  |  82<H>  ----------------------------<  130<H>  4.1   |  24  |  2.97<H>    Ca    8.2<L>      26 Apr 2018 06:21    TPro  x   /  Alb  3.5<L>  /  TBili  x   /  DBili  x   /  AST  x   /  ALT  x   /  AlkPhos  x   04-24

## 2018-04-26 NOTE — PROGRESS NOTE ADULT - SUBJECTIVE AND OBJECTIVE BOX
[facility]   [phone]  RENAL FOLLOW UP NOTE  --------------------------------------------------------------------------------  Chief Complaint:    24 hour events/subjective:        PAST HISTORY  --------------------------------------------------------------------------------  No significant changes to PMH, PSH, FHx, SHx, unless otherwise noted    ALLERGIES & MEDICATIONS  --------------------------------------------------------------------------------  Allergies    No Known Allergies    Intolerances      Standing Inpatient Medications  docusate sodium 100 milliGRAM(s) Oral three times a day  finasteride 5 milliGRAM(s) Oral daily  furosemide   Injectable 80 milliGRAM(s) IV Push two times a day  hydrALAZINE 25 milliGRAM(s) Oral every 12 hours  senna 2 Tablet(s) Oral at bedtime  warfarin 2.5 milliGRAM(s) Oral once    PRN Inpatient Medications  sodium chloride 0.65% Nasal 1 Spray(s) Both Nostrils every 2 hours PRN      REVIEW OF SYSTEMS  --------------------------------------------------------------------------------    Gen: denies fatigue, fevers/chills,  CVS: denies chest pain/palpitations  Resp: denies SOB/Cough  GI: Denies N/V/Abd pain  : Denies dysuria/oliguria/hematuria    All other systems were reviewed and are negative, except as noted.    VITALS/PHYSICAL EXAM  --------------------------------------------------------------------------------  T(C): 36.5 (04-26-18 @ 12:08), Max: 37 (04-25-18 @ 21:39)  HR: 77 (04-26-18 @ 18:42) (77 - 108)  BP: 137/69 (04-26-18 @ 18:42) (128/61 - 149/92)  RR: 18 (04-26-18 @ 12:08) (18 - 18)  SpO2: 96% (04-26-18 @ 12:08) (92% - 98%)  Wt(kg): --        04-25-18 @ 07:01  -  04-26-18 @ 07:00  --------------------------------------------------------  IN: 1340 mL / OUT: 1400 mL / NET: -60 mL    04-26-18 @ 07:01  -  04-26-18 @ 19:29  --------------------------------------------------------  IN: 420 mL / OUT: 600 mL / NET: -180 mL      Physical Exam:  	    Physical Exam:  	Gen: alert oriented place person and date   	Pulm: Decreased breath sounds b/l bases. no rales or ronchi or wheezing  	CV: RRR, S1/S2. no rub  	Back: No CVA tenderness; no sacral edema  	Abd: +BS, soft, nontender/nondistended  	: No suprapubic tenderness.               Extremity: No cyanosis, no edema no clubbing  	Neuro: No focal deficits  	Psych: Normal affect and mood      LABS/STUDIES  --------------------------------------------------------------------------------              10.0   7.0   >-----------<  227      [04-26-18 @ 06:21]              29.8     142  |  106  |  82  ----------------------------<  130      [04-26-18 @ 06:21]  4.1   |  24  |  2.97        Ca     8.2     [04-26-18 @ 06:21]      PT/INR: PT 23.5 , INR 2.14       [04-26-18 @ 06:21]      Creatinine Trend:  SCr 2.97 [04-26 @ 06:21]  SCr 3.02 [04-25 @ 06:07]  SCr 3.00 [04-24 @ 06:48]  SCr 2.88 [04-23 @ 21:59]      Urinalysis - [04-24-18 @ 08:24]      Color Yellow / Appearance Clear / SG 1.012 / pH 5.5      Gluc Negative / Ketone Negative  / Bili Negative / Urobili Negative       Blood Negative / Protein 300 / Leuk Est Negative / Nitrite Negative      RBC 0-2 / WBC 0-2 / Hyaline  / Gran  / Sq Epi  / Non Sq Epi  / Bacteria Few    Urine Creatinine 59      [04-24-18 @ 19:19]  Urine Protein 174      [04-26-18 @ 13:36]  Urine Sodium 62      [04-24-18 @ 06:48]  Urine Urea Nitrogen 463      [04-24-18 @ 07:54]  Urine Osmolality 376      [04-24-18 @ 06:48]    HbA1c 5.5      [04-25-18 @ 07:44]  TSH 2.32      [04-25-18 @ 07:53]  Lipid: chol 135, , HDL 25, LDL 76      [04-25-18 @ 07:55]    PUSHPA: titer Negative, pattern --      [04-25-18 @ 08:03]  C3 Complement 86      [04-25-18 @ 08:03]  C4 Complement 28      [04-25-18 @ 08:03]  Rheumatoid Factor <7      [04-25-18 @ 07:55]  SPEP Interpretation: Normal Electrophoresis Pattern      [04-24-18 @ 19:22]    < from: US Kidney and Bladder (04.24.18 @ 16:39) >    EXAM:  US KIDNEYS AND BLADDER                            PROCEDURE DATE:  04/24/2018            INTERPRETATION:  CLINICAL INFORMATION: Acute kidney injury, creatinine of   2.88.    COMPARISON: None available.    TECHNIQUE: Sonography of the kidneysand bladder.     FINDINGS:    Right kidney: Atrophic, measuring 7.7 cm. Increased cortical   echogenicity. No renal mass, hydronephrosis or calculi.    Left kidney: 13.0 cm. No renal mass, hydronephrosis or calculi. Mild   increase in the echogenicity of the renal parenchyma is noted with   cortical thickness lower limits of normal    Urinary bladder: Collapsed around Neil catheter balloon.    IMPRESSION:   .  No hydronephrosis.    Atrophic echogenic right kidney.    Cannot exclude renal parenchymal disease left kidney.

## 2018-04-26 NOTE — PROGRESS NOTE ADULT - ASSESSMENT
87 y.o male w/ pmhx a.fib, diastolic CHF (last echo noted 4/2015: LV 60%, severe pulm. htn, mod. 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  will need to change to arb soon given proteinuria   suspect ckd IV/proteinuria due to atrophic kidney and parenchymal loss   cont with lasix 80 mg iv bid  keep O> I   d/w daughter regarding advance ckd

## 2018-04-26 NOTE — PROGRESS NOTE ADULT - PROBLEM SELECTOR PLAN 7
Initially he was in Hypertensive crisis during admission. BP has been better on Hydralazine and Metoprolol.  - Reviewed Echo

## 2018-04-26 NOTE — PROGRESS NOTE ADULT - PROBLEM SELECTOR PLAN 5
On Neil. Spoke to daughter, he had reaction with Flomax.   - Will start Proscar 5mg daily  - TOV tomorrow

## 2018-04-26 NOTE — PROGRESS NOTE ADULT - PROBLEM SELECTOR PLAN 1
Patient is not in acute resp distress. No chest pain, sitting in chair. Reviewed Echo - Severe Pulmonary HTN, moderate AS with EF 70%. Last year (outpatient) EF 50-65%  - Renal and cardiology plans noted.   -c/w IV Lasix 80mg q 12hrs, Hydralazine 25mg bid, Metoprolol ER 100mg restarted for RVR  - Daily I/O, weight  ** spoke to Dtr- Leslie

## 2018-04-26 NOTE — PROGRESS NOTE ADULT - SUBJECTIVE AND OBJECTIVE BOX
Patient is a 87y old  Male who presents with a chief complaint of chest pain,     SUBJECTIVE / OVERNIGHT EVENTS:  The patient is seen and examined around 11am and 3pm.  Sitting in chair without acute resp distress, no c/o chest pain, afebrile, Tele- rapid a.fib 110-150s, WCT      MEDICATIONS  (STANDING):  docusate sodium 100 milliGRAM(s) Oral three times a day  finasteride 5 milliGRAM(s) Oral daily  furosemide   Injectable 80 milliGRAM(s) IV Push two times a day  hydrALAZINE 25 milliGRAM(s) Oral every 12 hours  senna 2 Tablet(s) Oral at bedtime  warfarin 2.5 milliGRAM(s) Oral once    MEDICATIONS  (PRN):  sodium chloride 0.65% Nasal 1 Spray(s) Both Nostrils every 2 hours PRN nasal dryness      Vital Signs Last 24 Hrs  T(C): 36.5 (26 Apr 2018 12:08), Max: 37 (25 Apr 2018 21:39)  T(F): 97.7 (26 Apr 2018 12:08), Max: 98.6 (25 Apr 2018 21:39)  HR: 97 (26 Apr 2018 12:08) (87 - 108)  BP: 128/61 (26 Apr 2018 12:08) (128/61 - 149/92)  BP(mean): --  RR: 18 (26 Apr 2018 12:08) (18 - 18)  SpO2: 96% (26 Apr 2018 12:08) (92% - 98%)  CAPILLARY BLOOD GLUCOSE        I&O's Summary    25 Apr 2018 07:01  -  26 Apr 2018 07:00  --------------------------------------------------------  IN: 1340 mL / OUT: 1400 mL / NET: -60 mL    26 Apr 2018 07:01  -  26 Apr 2018 16:18  --------------------------------------------------------  IN: 420 mL / OUT: 600 mL / NET: -180 mL        PHYSICAL EXAM:-  GENERAL: NAD, well-developed, no acute distress  EYES: EOMI, PERRLA, conjunctiva and sclera clear  NECK: Supple, No JVD, no thyromegaly  CHEST/LUNG: Clear to auscultation bilaterally; No wheeze  HEART: Regular rate and rhythm; S1, S2 audible, No murmurs, rubs, or gallops  ABDOMEN: Soft, Nontender, Nondistended; Bowel sounds present  EXTREMITIES:  2+ Peripheral Pulses, 2+ LE edema, warm  NEURO: AAOx3, no focal deficit      LABS:                        10.0   7.0   )-----------( 227      ( 26 Apr 2018 06:21 )             29.8     04-26    142  |  106  |  82<H>  ----------------------------<  130<H>  4.1   |  24  |  2.97<H>    Ca    8.2<L>      26 Apr 2018 06:21    TPro  x   /  Alb  3.5<L>  /  TBili  x   /  DBili  x   /  AST  x   /  ALT  x   /  AlkPhos  x   04-24    PT/INR - ( 26 Apr 2018 06:21 )   PT: 23.5 sec;   INR: 2.14 ratio         RADIOLOGY & ADDITIONAL TESTS:    Imaging Personally Reviewed: CXR, Renal US, Echo  Consultant(s) Notes Reviewed:  Card, Renal  Care Discussed with Consultants/Other Providers: card, Renal

## 2018-04-26 NOTE — PROGRESS NOTE ADULT - PROBLEM SELECTOR PLAN 3
- The patient has been having proteinuria from before (old recrd in the chart from 6/13/17)  - He is anemia, Normal C3, C4, hepatitis panel, SPEP, UPEP. Lipid panel normal  - Sed rate 34, Free light chain, ANCA are pending  - At his age doubt he would be a candidate for renal biopsy. Will speak to Dr Bob and Family  - No ACE/ARB for proteinuria due to TRAVIS on CKD 4

## 2018-04-26 NOTE — PROGRESS NOTE ADULT - ASSESSMENT
87 M with AF on A/C, mod AS on last echo presents with chest pain atypical and shortness of breath likely acute on chronic HFpEF  ·	Continue to diurese. Symptoms improving.   ·	Echo reviewed. NL lv systolic function, LVH, mod AS [place] 1.4   ·	HR and BP improved with restarting Toprol. Period of rosa. Can change to lopressor 25 BID  ·	Patient had WCT. There is axis change in at least on lead and suspicious for VT. Given this and chest pain he should have stress.   ·	Patient with mild nose bleed with NC. If stable would continue with A/C. Goal INR 2-3.   ·	D/w wife and Dr. Arriaga.     =======================================================================  Hermelindo Magana MD [facility]    Please page [phone] with questions  On nights and weekend please call the office [phone].

## 2018-04-26 NOTE — PROGRESS NOTE ADULT - PROBLEM SELECTOR PLAN 2
Improving renal function, Cr is 2.97 (baseline 2/8). Renal US showed right renal atrophy. Has been having proteinuria. T2DM controlled with HbA1C 5.5%  - Renal consult appreciated from Dr Hema Bob  - He is anemia, Normal C3, C4, hepatitis panel, SPEP, UPEP. Lipid panel normal  - Sed rate 34, Free light chain, ANCA are pending  - At his age doubt he would be a candidate for renal biopsy. Will speak to Dr Bob and Family

## 2018-04-26 NOTE — CHART NOTE - NSCHARTNOTEFT_GEN_A_CORE
Called by RN for patient with 17 beats wide complex on tele - possibly aberrancy with afib.  Patient asymptomatic - sitting on the toilet - likely ambulating at the time of episode4.  Potassium 4.1 - SCr 2.9.  BB resumed by attending.  Will await Cardiology follow-up.

## 2018-04-26 NOTE — PROGRESS NOTE ADULT - PROBLEM SELECTOR PLAN 4
Tele showed Rapid a.fib upto 150s with WCT 17 beats.  Toprol XL was discontinued 2 days ago per cardiology for isolated rosa arrythmia  - spoke to Dr Magana, Toprol XL was restarted but will start Metoprolol 25mg bid starting 4/27

## 2018-04-27 ENCOUNTER — TRANSCRIPTION ENCOUNTER (OUTPATIENT)
Age: 83
End: 2018-04-27

## 2018-04-27 LAB
% GAMMA, URINE: 4.7 % — SIGNIFICANT CHANGE UP
ALBUMIN 24H MFR UR ELPH: 85.2 % — SIGNIFICANT CHANGE UP
ALPHA1 GLOB 24H MFR UR ELPH: 2.1 % — SIGNIFICANT CHANGE UP
ALPHA2 GLOB 24H MFR UR ELPH: 3.2 % — SIGNIFICANT CHANGE UP
ANION GAP SERPL CALC-SCNC: 15 MMOL/L — SIGNIFICANT CHANGE UP (ref 5–17)
AUTO DIFF PNL BLD: NEGATIVE — SIGNIFICANT CHANGE UP
B-GLOBULIN 24H MFR UR ELPH: 4.8 % — SIGNIFICANT CHANGE UP
BUN SERPL-MCNC: 89 MG/DL — HIGH (ref 7–23)
C-ANCA SER-ACNC: NEGATIVE — SIGNIFICANT CHANGE UP
CALCIUM SERPL-MCNC: 8.2 MG/DL — LOW (ref 8.4–10.5)
CHLORIDE SERPL-SCNC: 103 MMOL/L — SIGNIFICANT CHANGE UP (ref 96–108)
CO2 SERPL-SCNC: 24 MMOL/L — SIGNIFICANT CHANGE UP (ref 22–31)
COLLECT DURATION TIME UR: 24 HR — SIGNIFICANT CHANGE UP
CREAT SERPL-MCNC: 3.21 MG/DL — HIGH (ref 0.5–1.3)
GLUCOSE SERPL-MCNC: 119 MG/DL — HIGH (ref 70–99)
INR BLD: 2.31 RATIO — HIGH (ref 0.88–1.16)
INTERPRETATION 24H UR IFE-IMP: SIGNIFICANT CHANGE UP
KAPPA LC SER QL IFE: 9.05 MG/DL — HIGH (ref 0.33–1.94)
KAPPA/LAMBDA FREE LIGHT CHAIN RATIO, SERUM: 1.57 RATIO — SIGNIFICANT CHANGE UP (ref 0.26–1.65)
LAMBDA LC SER QL IFE: 5.76 MG/DL — HIGH (ref 0.57–2.63)
M PROTEIN 24H UR ELPH-MRATE: 0 MG/24HR — SIGNIFICANT CHANGE UP (ref 0–0)
M PROTEIN 24H UR ELPH-MRATE: 0 MG/DL — SIGNIFICANT CHANGE UP
MAGNESIUM SERPL-MCNC: 1.9 MG/DL — SIGNIFICANT CHANGE UP (ref 1.6–2.6)
P-ANCA SER-ACNC: NEGATIVE — SIGNIFICANT CHANGE UP
PHOSPHATE SERPL-MCNC: 4.4 MG/DL — SIGNIFICANT CHANGE UP (ref 2.5–4.5)
POTASSIUM SERPL-MCNC: 4.3 MMOL/L — SIGNIFICANT CHANGE UP (ref 3.5–5.3)
POTASSIUM SERPL-SCNC: 4.3 MMOL/L — SIGNIFICANT CHANGE UP (ref 3.5–5.3)
PROT ?TM UR-MCNC: 174 MG/DL — HIGH (ref 0–12)
PROT PATTERN 24H UR ELPH-IMP: SIGNIFICANT CHANGE UP
PROTEIN QUANT CALC, URINE: 2871 MG/24 H — HIGH (ref 50–100)
PROTHROM AB SERPL-ACNC: 25.6 SEC — HIGH (ref 9.8–12.7)
SODIUM SERPL-SCNC: 142 MMOL/L — SIGNIFICANT CHANGE UP (ref 135–145)
TOTAL VOLUME - 24 HOUR: 1650 ML — SIGNIFICANT CHANGE UP
URINE CREATININE CALCULATION: 0.7 G/24 H — LOW (ref 1–2)

## 2018-04-27 PROCEDURE — 99233 SBSQ HOSP IP/OBS HIGH 50: CPT

## 2018-04-27 PROCEDURE — 99232 SBSQ HOSP IP/OBS MODERATE 35: CPT

## 2018-04-27 RX ORDER — WARFARIN SODIUM 2.5 MG/1
2 TABLET ORAL ONCE
Qty: 0 | Refills: 0 | Status: COMPLETED | OUTPATIENT
Start: 2018-04-27 | End: 2018-04-27

## 2018-04-27 RX ORDER — METOPROLOL TARTRATE 50 MG
25 TABLET ORAL ONCE
Qty: 0 | Refills: 0 | Status: DISCONTINUED | OUTPATIENT
Start: 2018-04-27 | End: 2018-04-27

## 2018-04-27 RX ORDER — METOPROLOL TARTRATE 50 MG
25 TABLET ORAL ONCE
Qty: 0 | Refills: 0 | Status: COMPLETED | OUTPATIENT
Start: 2018-04-27 | End: 2018-04-27

## 2018-04-27 RX ORDER — FUROSEMIDE 40 MG
80 TABLET ORAL EVERY 12 HOURS
Qty: 0 | Refills: 0 | Status: DISCONTINUED | OUTPATIENT
Start: 2018-04-27 | End: 2018-04-28

## 2018-04-27 RX ORDER — METOPROLOL TARTRATE 50 MG
50 TABLET ORAL
Qty: 0 | Refills: 0 | Status: DISCONTINUED | OUTPATIENT
Start: 2018-04-27 | End: 2018-04-28

## 2018-04-27 RX ORDER — FUROSEMIDE 40 MG
80 TABLET ORAL DAILY
Qty: 0 | Refills: 0 | Status: DISCONTINUED | OUTPATIENT
Start: 2018-04-27 | End: 2018-04-27

## 2018-04-27 RX ADMIN — Medication 25 MILLIGRAM(S): at 05:39

## 2018-04-27 RX ADMIN — Medication 25 MILLIGRAM(S): at 13:27

## 2018-04-27 RX ADMIN — Medication 80 MILLIGRAM(S): at 17:07

## 2018-04-27 RX ADMIN — Medication 100 MILLIGRAM(S): at 05:39

## 2018-04-27 RX ADMIN — Medication 50 MILLIGRAM(S): at 17:05

## 2018-04-27 RX ADMIN — Medication 100 MILLIGRAM(S): at 21:50

## 2018-04-27 RX ADMIN — Medication 80 MILLIGRAM(S): at 05:39

## 2018-04-27 RX ADMIN — SENNA PLUS 2 TABLET(S): 8.6 TABLET ORAL at 21:50

## 2018-04-27 RX ADMIN — FINASTERIDE 5 MILLIGRAM(S): 5 TABLET, FILM COATED ORAL at 09:52

## 2018-04-27 RX ADMIN — WARFARIN SODIUM 2 MILLIGRAM(S): 2.5 TABLET ORAL at 21:50

## 2018-04-27 RX ADMIN — Medication 25 MILLIGRAM(S): at 17:05

## 2018-04-27 RX ADMIN — Medication 100 MILLIGRAM(S): at 13:27

## 2018-04-27 NOTE — PROGRESS NOTE ADULT - PROBLEM SELECTOR PLAN 4
Tele showed Rapid a.fib upto 150s with WCT 17 beats.  Toprol XL was discontinued 2 days ago per cardiology for isolated rosa arrythmia  - spoke to Dr Magana, Toprol XL was restarted but will start Metoprolol 25mg bid starting 4/27 Tele showed Rapid a.fib upto 110s with WCT and aberrancy. Ideally he would need stress test but with his renal dysfunction he would not be a candidate for Cath if stress test is abnormal  - Increased Metoprolol 50mg bid  - will monitor Tele showed Rapid a.fib upto 110s with WCT and aberrancy. Ideally he would need stress test but with his renal dysfunction he would not be a candidate for Cath if stress test is abnormal  - Increased Metoprolol 50mg bid. c/w coumadin 2mg daily, Daily INR  - will monitor

## 2018-04-27 NOTE — DISCHARGE NOTE ADULT - MEDICATION SUMMARY - MEDICATIONS TO CHANGE
I will SWITCH the dose or number of times a day I take the medications listed below when I get home from the hospital:    Lasix 80 mg oral tablet  -- 1 tab(s) by mouth once a day

## 2018-04-27 NOTE — PROGRESS NOTE ADULT - SUBJECTIVE AND OBJECTIVE BOX
Cardiology Follow Up  ==========================================  CC: HFpEF, AF, NSVT, Mod AS    HPI: No significant cardiac events overnight.     Review Of Systems:  Negative except as documented above    Medications:    docusate sodium   100 milliGRAM(s) Oral (04-27-18 @ 13:27)   100 milliGRAM(s) Oral (04-27-18 @ 05:39)   100 milliGRAM(s) Oral (04-26-18 @ 21:14)   100 milliGRAM(s) Oral (04-26-18 @ 18:33)    finasteride   5 milliGRAM(s) Oral (04-27-18 @ 09:52)   5 milliGRAM(s) Oral (04-26-18 @ 18:33)    furosemide   Injectable   80 milliGRAM(s) IV Push (04-27-18 @ 17:07)    furosemide   Injectable   80 milliGRAM(s) IV Push (04-27-18 @ 05:39)   80 milliGRAM(s) IV Push (04-26-18 @ 18:30)    hydrALAZINE   25 milliGRAM(s) Oral (04-27-18 @ 17:05)   25 milliGRAM(s) Oral (04-27-18 @ 05:39)   25 milliGRAM(s) Oral (04-26-18 @ 18:41)    metoprolol tartrate   50 milliGRAM(s) Oral (04-27-18 @ 17:05)    metoprolol tartrate   25 milliGRAM(s) Oral (04-27-18 @ 13:27)    metoprolol tartrate   25 milliGRAM(s) Oral (04-27-18 @ 05:39)    senna   2 Tablet(s) Oral (04-26-18 @ 21:14)    warfarin   2.5 milliGRAM(s) Oral (04-26-18 @ 21:14)        Telemetry:     Vital Signs Last 24 Hrs  T(C): 36.4 (27 Apr 2018 13:08), Max: 37.1 (27 Apr 2018 04:27)  T(F): 97.5 (27 Apr 2018 13:08), Max: 98.8 (27 Apr 2018 04:27)  HR: 65 (27 Apr 2018 16:40) (62 - 80)  BP: 141/52 (27 Apr 2018 16:40) (124/70 - 141/52)  BP(mean): 88 (26 Apr 2018 20:40) (88 - 88)  RR: 16 (27 Apr 2018 13:08) (16 - 18)  SpO2: 94% (27 Apr 2018 13:08) (93% - 95%)  I&O's Summary    26 Apr 2018 07:01  -  27 Apr 2018 07:00  --------------------------------------------------------  IN: 420 mL / OUT: 1600 mL / NET: -1180 mL    27 Apr 2018 07:01  -  27 Apr 2018 18:12  --------------------------------------------------------  IN: 360 mL / OUT: 50 mL / NET: 310 mL        Physical Exam:  General: [x ] NAD  HEENT: [x ] EOMI [ x] PERRL  Cor: [x ] S1,S2 [x ] RRR [x ] No M/R/G   Lungs: [x ] CTA B/L [ x] Normal effort  Abd: [x ] Soft [x ] Non-tender [x ] +BS  Ext: [ x] No edema [x ] No cyanosis    Labs:                        10.0   7.0   )-----------( 227      ( 26 Apr 2018 06:21 )             29.8     04-27    142  |  103  |  89<H>  ----------------------------<  119<H>  4.3   |  24  |  3.21<H>    Ca    8.2<L>      27 Apr 2018 06:21  Phos  4.4     04-27  Mg     1.9     04-27

## 2018-04-27 NOTE — DISCHARGE NOTE ADULT - MEDICATION SUMMARY - MEDICATIONS TO TAKE
I will START or STAY ON the medications listed below when I get home from the hospital:    finasteride 5 mg oral tablet  -- 1 tab(s) by mouth once a day MDD:1  -- Indication: For BPH    losartan 25 mg oral tablet  -- 1 tab(s) by mouth once a day MDD:1  -- Indication: For HTN (hypertension)    Coumadin 2.5 mg oral tablet  -- 1 tab(s) by mouth once a day  -- Indication: For Atrial fibrillation    metoprolol tartrate 25 mg oral tablet  -- 1 tab(s) by mouth 2 times a day MDD:2  -- Indication: For Atrial fibrillation    petrolatum topical ointment  -- 1 application on skin 2 times a day  -- Indication: For Dry Skin    ammonium lactate 12% topical lotion  -- 1 application on skin 2 times a day  -- Indication: For Dry Skin    furosemide 80 mg oral tablet  -- 1 tab(s) by mouth 2 times a day MDD:2  -- Indication: For Congestive heart failure    sodium chloride 0.65% nasal spray  -- 1 spray(s) into nose 2 times a day, As Needed   -- Indication: For Allergic Rhinitis    hydrALAZINE 10 mg oral tablet  -- 1 tab(s) by mouth every 8 hours MDD:3  -- Indication: For Hypertensive urgency I will START or STAY ON the medications listed below when I get home from the hospital:    finasteride 5 mg oral tablet  -- 1 tab(s) by mouth once a day MDD:1  -- Indication: For BPH    losartan 25 mg oral tablet  -- 1 tab(s) by mouth once a day MDD:1  -- Indication: For HTN (hypertension)    Coumadin 2.5 mg oral tablet  -- 1 tab(s) by mouth once a day  -- Indication: For Atrial fibrillation.  Start on 5/9/18.      metoprolol tartrate 25 mg oral tablet  -- 1 tab(s) by mouth 2 times a day MDD:2  -- Indication: For Atrial fibrillation    petrolatum topical ointment  -- 1 application on skin 2 times a day  -- Indication: For Dry Skin    ammonium lactate 12% topical lotion  -- 1 application on skin 2 times a day  -- Indication: For Dry Skin    furosemide 80 mg oral tablet  -- 1 tab(s) by mouth 2 times a day MDD:2  -- Indication: For Congestive heart failure    sodium chloride 0.65% nasal spray  -- 1 spray(s) into nose 2 times a day, As Needed   -- Indication: For Allergic Rhinitis    hydrALAZINE 10 mg oral tablet  -- 1 tab(s) by mouth every 8 hours MDD:3  -- Indication: For Hypertensive urgency

## 2018-04-27 NOTE — PROGRESS NOTE ADULT - PROBLEM SELECTOR PLAN 2
Renal function is worse today. , Cr is 3.21 (baseline 2.8). Renal US showed right renal atrophy. Has been having proteinuria. T2DM controlled with HbA1C 5.5%  - Renal consult appreciated from Dr eHma Bob. No plan for renal biopsy at this age  - He is anemia, Normal PUSHPA, C3, C4, hepatitis panel, SPEP, UPEP. Lipid panel normal  - Sed rate 34, Free light chain, ANCA are pending  - At his age doubt he would be a candidate for renal biopsy. Dr Bob (Renal) spoke to Daughter.

## 2018-04-27 NOTE — PROGRESS NOTE ADULT - SUBJECTIVE AND OBJECTIVE BOX
Patient is a 87y old  Male who presents with a chief complaint of Chest    SUBJECTIVE / OVERNIGHT EVENTS:  Sitting in chair, no SOB, LE are still edematous, Tele-       MEDICATIONS  (STANDING):  docusate sodium 100 milliGRAM(s) Oral three times a day  finasteride 5 milliGRAM(s) Oral daily  furosemide   Injectable 80 milliGRAM(s) IV Push every 12 hours  hydrALAZINE 25 milliGRAM(s) Oral every 12 hours  metoprolol succinate ER 25 milliGRAM(s) Oral once  metoprolol tartrate 50 milliGRAM(s) Oral two times a day  senna 2 Tablet(s) Oral at bedtime  warfarin 2 milliGRAM(s) Oral once    MEDICATIONS  (PRN):  sodium chloride 0.65% Nasal 1 Spray(s) Both Nostrils every 2 hours PRN nasal dryness      Vital Signs Last 24 Hrs  T(C): 37.1 (27 Apr 2018 04:27), Max: 37.1 (27 Apr 2018 04:27)  T(F): 98.8 (27 Apr 2018 04:27), Max: 98.8 (27 Apr 2018 04:27)  HR: 62 (27 Apr 2018 04:27) (62 - 77)  BP: 132/76 (27 Apr 2018 04:27) (124/70 - 137/69)  BP(mean): 88 (26 Apr 2018 20:40) (88 - 88)  RR: 18 (27 Apr 2018 04:27) (18 - 18)  SpO2: 95% (27 Apr 2018 04:27) (93% - 95%)  CAPILLARY BLOOD GLUCOSE        I&O's Summary    26 Apr 2018 07:01  -  27 Apr 2018 07:00  --------------------------------------------------------  IN: 420 mL / OUT: 1600 mL / NET: -1180 mL    27 Apr 2018 07:01  -  27 Apr 2018 12:34  --------------------------------------------------------  IN: 0 mL / OUT: 50 mL / NET: -50 mL        PHYSICAL EXAM:-  GENERAL: NAD, well-developed, No acte distress, Tele- A.fib 80s, WCT, aberrancy   EYES: EOMI, PERRLA, conjunctiva and sclera clear  NECK: Supple, No JVD, no thyromegaly  CHEST/LUNG: Clear to auscultation bilaterally; No wheeze  HEART: Regular rate and rhythm; S1, S2 audible, No murmurs, rubs, or gallops  ABDOMEN: Soft, Nontender, Nondistended; Bowel sounds present  EXTREMITIES:  warm ext, + Peripheral Pulses, No clubbing, cyanosis, 2+ LE edema  NEURO: AAOx3, no focal deficit    LABS:                        10.0   7.0   )-----------( 227      ( 26 Apr 2018 06:21 )             29.8     04-27    142  |  103  |  89<H>  ----------------------------<  119<H>  4.3   |  24  |  3.21<H>    Ca    8.2<L>      27 Apr 2018 06:21  Phos  4.4     04-27  Mg     1.9     04-27    PT/INR - ( 27 Apr 2018 06:21 )   PT: 25.6 sec;   INR: 2.31 ratio         RADIOLOGY & ADDITIONAL TESTS:    Imaging Personally Reviewed:   Consultant(s) Notes Reviewed:    Care Discussed with Consultants/Other Providers: Patient is a 87y old  Male who presents with a chief complaint of Chest    SUBJECTIVE / OVERNIGHT EVENTS:  Sitting in chair, no SOB, LE are still edematous, Tele- a.fib, WCT, aberrancy      MEDICATIONS  (STANDING):  docusate sodium 100 milliGRAM(s) Oral three times a day  finasteride 5 milliGRAM(s) Oral daily  furosemide   Injectable 80 milliGRAM(s) IV Push every 12 hours  hydrALAZINE 25 milliGRAM(s) Oral every 12 hours  metoprolol succinate ER 25 milliGRAM(s) Oral once  metoprolol tartrate 50 milliGRAM(s) Oral two times a day  senna 2 Tablet(s) Oral at bedtime  warfarin 2 milliGRAM(s) Oral once    MEDICATIONS  (PRN):  sodium chloride 0.65% Nasal 1 Spray(s) Both Nostrils every 2 hours PRN nasal dryness      Vital Signs Last 24 Hrs  T(C): 37.1 (27 Apr 2018 04:27), Max: 37.1 (27 Apr 2018 04:27)  T(F): 98.8 (27 Apr 2018 04:27), Max: 98.8 (27 Apr 2018 04:27)  HR: 62 (27 Apr 2018 04:27) (62 - 77)  BP: 132/76 (27 Apr 2018 04:27) (124/70 - 137/69)  BP(mean): 88 (26 Apr 2018 20:40) (88 - 88)  RR: 18 (27 Apr 2018 04:27) (18 - 18)  SpO2: 95% (27 Apr 2018 04:27) (93% - 95%)  CAPILLARY BLOOD GLUCOSE        I&O's Summary    26 Apr 2018 07:01  -  27 Apr 2018 07:00  --------------------------------------------------------  IN: 420 mL / OUT: 1600 mL / NET: -1180 mL    27 Apr 2018 07:01  -  27 Apr 2018 12:34  --------------------------------------------------------  IN: 0 mL / OUT: 50 mL / NET: -50 mL        PHYSICAL EXAM:-  GENERAL: NAD, well-developed, No acte distress, Tele- A.fib 80s, WCT, aberrancy   EYES: EOMI, PERRLA, conjunctiva and sclera clear  NECK: Supple, No JVD, no thyromegaly  CHEST/LUNG: Clear to auscultation bilaterally; No wheeze  HEART: Regular rate and rhythm; S1, S2 audible, No murmurs, rubs, or gallops  ABDOMEN: Soft, Nontender, Nondistended; Bowel sounds present  EXTREMITIES:  warm ext, + Peripheral Pulses, No clubbing, cyanosis, 2+ LE edema  NEURO: AAOx3, no focal deficit    LABS:                        10.0   7.0   )-----------( 227      ( 26 Apr 2018 06:21 )             29.8     04-27    142  |  103  |  89<H>  ----------------------------<  119<H>  4.3   |  24  |  3.21<H>    Ca    8.2<L>      27 Apr 2018 06:21  Phos  4.4     04-27  Mg     1.9     04-27    PT/INR - ( 27 Apr 2018 06:21 )   PT: 25.6 sec;   INR: 2.31 ratio         RADIOLOGY & ADDITIONAL TESTS:    Imaging Personally Reviewed: CXR, Echo  Consultant(s) Notes Reviewed: Card, Renal  Care Discussed with Consultants/Other Providers:

## 2018-04-27 NOTE — PROGRESS NOTE ADULT - SUBJECTIVE AND OBJECTIVE BOX
[facility]   [phone]  RENAL FOLLOW UP NOTE  --------------------------------------------------------------------------------  Chief Complaint:    24 hour events/subjective:    breathing is slightly better per pt still with edema     PAST HISTORY  --------------------------------------------------------------------------------  No significant changes to PMH, PSH, FHx, SHx, unless otherwise noted    ALLERGIES & MEDICATIONS  --------------------------------------------------------------------------------  Allergies    No Known Allergies    Intolerances      Standing Inpatient Medications  docusate sodium 100 milliGRAM(s) Oral three times a day  finasteride 5 milliGRAM(s) Oral daily  furosemide   Injectable 80 milliGRAM(s) IV Push every 12 hours  hydrALAZINE 25 milliGRAM(s) Oral every 12 hours  metoprolol tartrate 50 milliGRAM(s) Oral two times a day  senna 2 Tablet(s) Oral at bedtime  warfarin 2 milliGRAM(s) Oral once    PRN Inpatient Medications  sodium chloride 0.65% Nasal 1 Spray(s) Both Nostrils every 2 hours PRN      REVIEW OF SYSTEMS  --------------------------------------------------------------------------------    Gen: denies fevers/chills,  CVS: denies chest pain/palpitations  Resp: denies worsening SOB/Cough  GI: Denies N/V/Abd pain  : Denies dysuria/oliguria/hematuria  c/o edema     All other systems were reviewed and are negative, except as noted.    VITALS/PHYSICAL EXAM  --------------------------------------------------------------------------------  T(C): 36.4 (04-27-18 @ 13:08), Max: 37.1 (04-27-18 @ 04:27)  HR: 65 (04-27-18 @ 16:40) (62 - 80)  BP: 141/52 (04-27-18 @ 16:40) (124/70 - 141/52)  RR: 16 (04-27-18 @ 13:08) (16 - 18)  SpO2: 94% (04-27-18 @ 13:08) (93% - 95%)  Wt(kg): --        04-26-18 @ 07:01  -  04-27-18 @ 07:00  --------------------------------------------------------  IN: 420 mL / OUT: 1600 mL / NET: -1180 mL    04-27-18 @ 07:01  -  04-27-18 @ 19:22  --------------------------------------------------------  IN: 360 mL / OUT: 50 mL / NET: 310 mL        Physical Exam:  	Gen: alert oriented place person and date   	Pulm: Decreased breath sounds b/l bases. no rales or ronchi or wheezing  	CV: RRR, S1/S2. no rub  	Back: No CVA tenderness; no sacral edema  	Abd: +BS, soft, nontender/nondistended  	: No suprapubic tenderness.               Extremity: No cyanosis, 2+  edema no clubbing  	  correction to my 4/26 note: pt did have 2+ edema then as well       LABS/STUDIES  --------------------------------------------------------------------------------              10.0   7.0   >-----------<  227      [04-26-18 @ 06:21]              29.8     142  |  103  |  89  ----------------------------<  119      [04-27-18 @ 06:21]  4.3   |  24  |  3.21        Ca     8.2     [04-27-18 @ 06:21]      Mg     1.9     [04-27-18 @ 06:21]      Phos  4.4     [04-27-18 @ 06:21]      PT/INR: PT 25.6 , INR 2.31       [04-27-18 @ 06:21]      Creatinine Trend:  SCr 3.21 [04-27 @ 06:21]  SCr 2.97 [04-26 @ 06:21]  SCr 3.02 [04-25 @ 06:07]  SCr 3.00 [04-24 @ 06:48]  SCr 2.88 [04-23 @ 21:59]    Urinalysis - [04-24-18 @ 08:24]      Color Yellow / Appearance Clear / SG 1.012 / pH 5.5      Gluc Negative / Ketone Negative  / Bili Negative / Urobili Negative       Blood Negative / Protein 300 / Leuk Est Negative / Nitrite Negative      RBC 0-2 / WBC 0-2 / Hyaline  / Gran  / Sq Epi  / Non Sq Epi  / Bacteria Few    Urine Creatinine 59      [04-24-18 @ 19:19]  Urine Protein 174      [04-26-18 @ 13:36]  Urine Sodium 62      [04-24-18 @ 06:48]  Urine Urea Nitrogen 463      [04-24-18 @ 07:54]  Urine Osmolality 376      [04-24-18 @ 06:48]    HbA1c 5.5      [04-25-18 @ 07:44]  TSH 2.32      [04-25-18 @ 07:53]  Lipid: chol 135, , HDL 25, LDL 76      [04-25-18 @ 07:55]    PUSHPA: titer Negative, pattern --      [04-25-18 @ 08:03]  C3 Complement 86      [04-25-18 @ 08:03]  C4 Complement 28      [04-25-18 @ 08:03]  Rheumatoid Factor <7      [04-25-18 @ 07:55]  ANCA: cANCA Negative, pANCA Negative, atypical ANCA Negative      [04-26-18 @ 07:45]  MPO-ANCA <5.0, interpretation: Negative      [04-26-18 @ 07:45]  PR3-ANCA <5.0, interpretation: Negative      [04-26-18 @ 07:45]  SPEP Interpretation: Normal Electrophoresis Pattern      [04-24-18 @ 19:22]  Immunofixation Urine: No Monoclonal Band Identified      [04-26-18 @ 13:36]  UPEP Interpretation: Mild Selective (Glomerular) Proteinuria      [04-26-18 @ 13:36]

## 2018-04-27 NOTE — DISCHARGE NOTE ADULT - PLAN OF CARE
Take all medications as prescribed.  Stop smoking if you currently smoke, and avoid high altitudes.  Weigh yourself daily.  If you gain 3lbs in 3 days, or 5lbs in a week call your Health Care Provider.  Eat a low sodium diet.  If you have pulmonary hypertension and you are a female of child bearing age, talk to your caregiver about using birth control pills or getting pregnant.  Call your Health Care Provider if you have any swelling or increased swelling in your feet, ankles, and/or stomach.  If you experience dizziness, chest pain, or shortness of breath, seek immediate medical attention. Avoid taking NSAIDs (ex: Ibuprofen, Advil, Celebrex, Naprosyn) and other agents that can harm the kidneys such as intravenous contrast for diagnostic testing, combination cold medications, etc. until you are instructed to do so by your Primary Care Physician.  Have all of your medications adjusted for your renal function by your Health Care Provider.  Blood pressure control is important.  Take all medication as prescribed.  Do not overconsume foods that are high in potassium, such as bananas, until you are instructed to do so by your primary care physician. Atrial fibrillation is a common heart rhythm problem which increases the risk of stroke and heat attack.  It helps if you control your blood pressure, avoid alcohol, cut down on caffeine, get treatment for your thyroid if it is overactive, and perform moderate exercise in consultation with your Primary Care Provider.  Call your doctor if you experience chest tightness/pain, lightheadedness, loss of consciousness, shortness of breath (especially with exercise), feel your heart racing or beating unusually, frequent or abnormal bleeding.  It is important to take all your heart medications as prescribed. Make sure you get your HgA1c checked every three months.  If you take oral diabetes medications, check your blood glucose at least two times a day.  If you take short-acting insulin, check your blood glucose before meals and at bedtime.  It's important not to skip any meals.  Keep a log of your blood glucose results and always take it with you to your doctor appointments.  Keep a list of your current medications including over the counter medications and bring this medication list with you to all your doctor appointments.  If you have not seen your ophthalmologist this year, call for appointment.  Check your feet daily for redness, sores, or openings.  Do not self treat.  If there is no improvement in two days, call your primary care physician for an appointment.    HgA1c this admission was _.  Appointment scheduled with  _ on _________. Resolving. Follow up with PMD in 1 week. Stable Stable. Follow up with Dr Bob (nephrology) in 1 week to monitor your kidney function. normocytic normochromic cells, teardrops on smear  will need bone marrow as outpatient to look for MDS  iron studies, B12, folate within normal limits  may have anemia of chronic disease  will not give BRIE tx at this time until outpatient hematology eval at [facility]  Please call: [phone] to schedule appointment

## 2018-04-27 NOTE — DISCHARGE NOTE ADULT - SECONDARY DIAGNOSIS.
Acute kidney injury superimposed on CKD Atrial fibrillation with RVR Proteinuria Diabetes Anemia, unspecified type

## 2018-04-27 NOTE — DISCHARGE NOTE ADULT - CARE PROVIDERS DIRECT ADDRESSES
[email or web address]_Unknown

## 2018-04-27 NOTE — PROGRESS NOTE ADULT - PROBLEM SELECTOR PLAN 1
Patient is not in acute resp distress. No chest pain, sitting in chair. Reviewed Echo - Severe Pulmonary HTN, moderate AS with EF 70%. Last year (outpatient) EF 50-65%  - Renal and cardiology plans noted.   - Has been on IV Lasix 80mg q 12hrs, Hydralazine 25mg bid, Metoprolol increased to 50mg bid for WCT, Aberrancy  - Daily I/O, weight  ** spoke to Dtr- Leslie

## 2018-04-27 NOTE — PROGRESS NOTE ADULT - ASSESSMENT
87 y.o male w/ pmhx a.fib, diastolic CHF (last echo noted 4/2015: LV 60%, severe pulm. htn, mod. AS), HTN presenting w/ c/o of CP x 1 day with sob/chf as well as htn urgency    1- CKD IV suspected and progressive in nature given hx of baseline cr 2.56 in 2016 as documented  2- HTN   3- CHF  4- nephrotic syndrome     serologies are neg for  c-anca/p-anca MPO and anti-proteinase 3 assay   hydralazine to cont for now.  will need to change to arb soon given proteinuria   d/w Dr. Arriaga   suspect ckd IV/proteinuria due to atrophic kidney and parenchymal loss   cont with lasix 80 mg iv bid for another 48 hours unless cr worsens more   keep O> I

## 2018-04-27 NOTE — PROGRESS NOTE ADULT - PROBLEM SELECTOR PLAN 5
On Neil. Spoke to daughter, he had reaction with Flomax.   - Will start Proscar 5mg daily  - TOV tomorrow TOV today and will d/c hobbs if possible  - On Proscar. He had some reaction with Flomax ? cataract issue

## 2018-04-27 NOTE — DISCHARGE NOTE ADULT - ADDITIONAL INSTRUCTIONS
Follow up with PMD in 1 week. Follow up with PMD in 1 week.  Must follow up with your Cardiologist, Dr. Mcmahan on Thursday, May 10, 2018 to check INR.  Restart Coumadin 2.5 mg daily on 5/9/18.

## 2018-04-27 NOTE — PROGRESS NOTE ADULT - PROBLEM SELECTOR PLAN 3
The patient has been having proteinuria from before (old recrd in the chart from 6/13/17)  - He is anemia, Normal C3, C4, hepatitis panel, SPEP, UPEP. Lipid panel normal  - Sed rate 34, Free light chain, ANCA are pending  - At his age doubt he would be a candidate for renal biopsy. Will speak to Dr Bob and Family  - No ACE/ARB for proteinuria due to TRAVIS on CKD 4 The patient has been having proteinuria from before (old recrd in the chart from 6/13/17). DDx- Glomerular, Tubular, overflow or postrenal.  - He is anemia, Normal C3, C4, hepatitis panel, SPEP, UPEP. Lipid panel normal  - Sed rate 34, Free light chain, ANCA are pending. CT chest showed granuloma  - At his age doubt he would be a candidate for renal biopsy. Speak to Dr Bob and Family. Medical management for now  - No ACE/ARB for proteinuria due to TRAVIS on CKD 4

## 2018-04-27 NOTE — DISCHARGE NOTE ADULT - PATIENT PORTAL LINK FT
You can access the [facility] Patient Portal, offered by [facility], by registering with the following website: [email or web address]

## 2018-04-27 NOTE — DISCHARGE NOTE ADULT - HOSPITAL COURSE
Patient admitted for hypoxic respiratory failure and acute on chronic diastolic HF exacerbation most likely in setting of HTN urgency and worsening renal function, TRAVIS on CKD.  Patient diuresed with IV Lasix and SOB has resolved.  Patient's BB - Toprol XL was held due to episodes of bradycardia down to the 37, later restarted 2/2 runs of rapid afib and WCT.  BB was later changed to Lopressor, as patient had additional bradycardic episodes upon the reintroduction of Toprol.  No ACE/ARB on board due to TRAVIS on CKD 4.  Renal US was negative for hydronephrosis, but showed Rt renal atrophy.  In addition, patient has proteinuria and anemia.  Patient was seen by Nephrology, serologies done.  No plans for renal biopsy at this time - suspect CKD IV/proteinuria due to atrophic kidney and parenchymal loss.  On echo - hyperdynamic left ventricular systolic function with EF of 75-80%, moderate AS, and severe pulmonary pressures. 88 y/o man w/ PMH AFib, diastolic CHF (last echo noted 4/2018: LV 60%, severe pulm. htn, mod. 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  He was maintained on warfarin for his atrial fibrillation.  He was adequately diuresed for his acute diastolic heart failure exacerbation, followed by cardiology and renal.  Renal to consider addition of metolazone and uptitration of ARB as outpatient.    He was seen by nephrology for CKD4 and proteinuria.  Renal US was negative for hydronephrosis, but showed Rt renal atrophy.  In addition, patient has proteinuria and anemia.  Serologies are neg for  c-anca/p-anca MPO and anti-proteinase 3 assay despite lung granulomas.  Suspect ckd IV/proteinuria due to atrophic kidney and parenchymal loss.  The patient has been having proteinuria from before (old record in the chart from 6/13/17). DDx- Glomerular, Tubular, overflow or postrenal. SPEP is abnormal but keppa/Lamda ratio is normal.  Appreciated Heme consult and plan, outpatient f/u at [facility], might need BM biopsy to r/o MDS.  He was kept on losartan.    For his diabetes, his HbA1C is 5.5%    His HTN was controlled on discharge.    For his anemia, he has no evidence of hemolysis or bleeding, no iron/B12 or folate deficiency.  Suspect this is likely due to his CKD- stage IV disease.  Recommend epo level as outpatient.  SPEP no monoclonal spike, normal kappa/lambda ratio- unlikely plasma cell disorder.  His peripheral smear with some dysplastic neutrophils.  Outpatient follow up, no intervention for his anemia unless his Hg less than 8 g/dL.

## 2018-04-27 NOTE — DISCHARGE NOTE ADULT - CARE PLAN
Principal Discharge DX:	Acute on chronic diastolic congestive heart failure  Assessment and plan of treatment:	Take all medications as prescribed.  Stop smoking if you currently smoke, and avoid high altitudes.  Weigh yourself daily.  If you gain 3lbs in 3 days, or 5lbs in a week call your Health Care Provider.  Eat a low sodium diet.  If you have pulmonary hypertension and you are a female of child bearing age, talk to your caregiver about using birth control pills or getting pregnant.  Call your Health Care Provider if you have any swelling or increased swelling in your feet, ankles, and/or stomach.  If you experience dizziness, chest pain, or shortness of breath, seek immediate medical attention.  Secondary Diagnosis:	Acute kidney injury superimposed on CKD  Assessment and plan of treatment:	Avoid taking NSAIDs (ex: Ibuprofen, Advil, Celebrex, Naprosyn) and other agents that can harm the kidneys such as intravenous contrast for diagnostic testing, combination cold medications, etc. until you are instructed to do so by your Primary Care Physician.  Have all of your medications adjusted for your renal function by your Health Care Provider.  Blood pressure control is important.  Take all medication as prescribed.  Do not overconsume foods that are high in potassium, such as bananas, until you are instructed to do so by your primary care physician.  Secondary Diagnosis:	Atrial fibrillation with RVR  Assessment and plan of treatment:	Atrial fibrillation is a common heart rhythm problem which increases the risk of stroke and heat attack.  It helps if you control your blood pressure, avoid alcohol, cut down on caffeine, get treatment for your thyroid if it is overactive, and perform moderate exercise in consultation with your Primary Care Provider.  Call your doctor if you experience chest tightness/pain, lightheadedness, loss of consciousness, shortness of breath (especially with exercise), feel your heart racing or beating unusually, frequent or abnormal bleeding.  It is important to take all your heart medications as prescribed.  Secondary Diagnosis:	Proteinuria  Secondary Diagnosis:	Diabetes  Assessment and plan of treatment:	Make sure you get your HgA1c checked every three months.  If you take oral diabetes medications, check your blood glucose at least two times a day.  If you take short-acting insulin, check your blood glucose before meals and at bedtime.  It's important not to skip any meals.  Keep a log of your blood glucose results and always take it with you to your doctor appointments.  Keep a list of your current medications including over the counter medications and bring this medication list with you to all your doctor appointments.  If you have not seen your ophthalmologist this year, call for appointment.  Check your feet daily for redness, sores, or openings.  Do not self treat.  If there is no improvement in two days, call your primary care physician for an appointment.    HgA1c this admission was _.  Appointment scheduled with Dr. Villegas on _________. Principal Discharge DX:	Acute on chronic diastolic congestive heart failure  Goal:	Stable  Assessment and plan of treatment:	Take all medications as prescribed.  Stop smoking if you currently smoke, and avoid high altitudes.  Weigh yourself daily.  If you gain 3lbs in 3 days, or 5lbs in a week call your Health Care Provider.  Eat a low sodium diet.  If you have pulmonary hypertension and you are a female of child bearing age, talk to your caregiver about using birth control pills or getting pregnant.  Call your Health Care Provider if you have any swelling or increased swelling in your feet, ankles, and/or stomach.  If you experience dizziness, chest pain, or shortness of breath, seek immediate medical attention.  Secondary Diagnosis:	Acute kidney injury superimposed on CKD  Goal:	Resolving.  Assessment and plan of treatment:	Avoid taking NSAIDs (ex: Ibuprofen, Advil, Celebrex, Naprosyn) and other agents that can harm the kidneys such as intravenous contrast for diagnostic testing, combination cold medications, etc. until you are instructed to do so by your Primary Care Physician.  Have all of your medications adjusted for your renal function by your Health Care Provider.  Blood pressure control is important.  Take all medication as prescribed.  Do not overconsume foods that are high in potassium, such as bananas, until you are instructed to do so by your primary care physician.  Secondary Diagnosis:	Atrial fibrillation with RVR  Goal:	Stable.  Assessment and plan of treatment:	Atrial fibrillation is a common heart rhythm problem which increases the risk of stroke and heat attack.  It helps if you control your blood pressure, avoid alcohol, cut down on caffeine, get treatment for your thyroid if it is overactive, and perform moderate exercise in consultation with your Primary Care Provider.  Call your doctor if you experience chest tightness/pain, lightheadedness, loss of consciousness, shortness of breath (especially with exercise), feel your heart racing or beating unusually, frequent or abnormal bleeding.  It is important to take all your heart medications as prescribed.  Secondary Diagnosis:	Proteinuria  Goal:	Resolving.  Assessment and plan of treatment:	Follow up with PMD in 1 week.  Secondary Diagnosis:	Diabetes  Goal:	Stable  Assessment and plan of treatment:	Make sure you get your HgA1c checked every three months.  If you take oral diabetes medications, check your blood glucose at least two times a day.  If you take short-acting insulin, check your blood glucose before meals and at bedtime.  It's important not to skip any meals.  Keep a log of your blood glucose results and always take it with you to your doctor appointments.  Keep a list of your current medications including over the counter medications and bring this medication list with you to all your doctor appointments.  If you have not seen your ophthalmologist this year, call for appointment.  Check your feet daily for redness, sores, or openings.  Do not self treat.  If there is no improvement in two days, call your primary care physician for an appointment.    HgA1c this admission was _.  Appointment scheduled with  _ on _________. Principal Discharge DX:	Acute on chronic diastolic congestive heart failure  Goal:	Stable  Assessment and plan of treatment:	Take all medications as prescribed.  Stop smoking if you currently smoke, and avoid high altitudes.  Weigh yourself daily.  If you gain 3lbs in 3 days, or 5lbs in a week call your Health Care Provider.  Eat a low sodium diet.  If you have pulmonary hypertension and you are a female of child bearing age, talk to your caregiver about using birth control pills or getting pregnant.  Call your Health Care Provider if you have any swelling or increased swelling in your feet, ankles, and/or stomach.  If you experience dizziness, chest pain, or shortness of breath, seek immediate medical attention.  Secondary Diagnosis:	Acute kidney injury superimposed on CKD  Goal:	Resolving.  Assessment and plan of treatment:	Avoid taking NSAIDs (ex: Ibuprofen, Advil, Celebrex, Naprosyn) and other agents that can harm the kidneys such as intravenous contrast for diagnostic testing, combination cold medications, etc. until you are instructed to do so by your Primary Care Physician.  Have all of your medications adjusted for your renal function by your Health Care Provider.  Blood pressure control is important.  Take all medication as prescribed.  Do not overconsume foods that are high in potassium, such as bananas, until you are instructed to do so by your primary care physician.  Secondary Diagnosis:	Atrial fibrillation with RVR  Goal:	Stable.  Assessment and plan of treatment:	Atrial fibrillation is a common heart rhythm problem which increases the risk of stroke and heat attack.  It helps if you control your blood pressure, avoid alcohol, cut down on caffeine, get treatment for your thyroid if it is overactive, and perform moderate exercise in consultation with your Primary Care Provider.  Call your doctor if you experience chest tightness/pain, lightheadedness, loss of consciousness, shortness of breath (especially with exercise), feel your heart racing or beating unusually, frequent or abnormal bleeding.  It is important to take all your heart medications as prescribed.  Secondary Diagnosis:	Proteinuria  Goal:	Resolving.  Assessment and plan of treatment:	Follow up with Dr Bob (nephrology) in 1 week to monitor your kidney function.  Secondary Diagnosis:	Diabetes  Goal:	Stable  Assessment and plan of treatment:	Make sure you get your HgA1c checked every three months.  If you take oral diabetes medications, check your blood glucose at least two times a day.  If you take short-acting insulin, check your blood glucose before meals and at bedtime.  It's important not to skip any meals.  Keep a log of your blood glucose results and always take it with you to your doctor appointments.  Keep a list of your current medications including over the counter medications and bring this medication list with you to all your doctor appointments.  If you have not seen your ophthalmologist this year, call for appointment.  Check your feet daily for redness, sores, or openings.  Do not self treat.  If there is no improvement in two days, call your primary care physician for an appointment.    HgA1c this admission was _.  Appointment scheduled with Dr. Villegas on _________.  Secondary Diagnosis:	Anemia, unspecified type  Assessment and plan of treatment:	normocytic normochromic cells, teardrops on smear  will need bone marrow as outpatient to look for MDS  iron studies, B12, folate within normal limits  may have anemia of chronic disease  will not give BRIE tx at this time until outpatient hematology [place] [facility]  Please call: [phone] to schedule appointment

## 2018-04-27 NOTE — PROGRESS NOTE ADULT - ASSESSMENT
87 M with AF on A/C, mod AS on last echo presents with chest pain atypical and shortness of breath likely acute on chronic HFpEF  ·	Continue to diurese. Symptoms improving.   ·	Echo reviewed. NL lv systolic function, LVH, mod AS [place] 1.4   ·	HR and BP on present medications. Currently on lopressor 50 BID.   ·	Given NSVT ideally patient should have ischemic eval. However given advanced renal disease patient may not be candidate for cath. Would start to decrease diuretics if crt continues to rise. Monitor strict I and O.  ·	D/w wife and Dr. Arriaga.     =======================================================================  Hermelindo Magana MD [facility]    Please page [phone] with questions  On nights and weekend please call the office [phone].

## 2018-04-27 NOTE — DISCHARGE NOTE ADULT - CARE PROVIDER_API CALL
Praveen Henderson), Cardiology; Cardiovascular Disease; Nuclear Cardiology  [address] [place] [address]  Phone: [phone]  Fax: [phone] Praveen Henderson), Cardiology; Cardiovascular Disease; Nuclear Cardiology  [address] [place] [address]  Phone: [phone]  Fax: [phone]    Hema Bob (), Nephrology  [address] [place] [id number]  Phone: [phone]  Fax: [phone]

## 2018-04-28 DIAGNOSIS — R00.1 BRADYCARDIA, UNSPECIFIED: ICD-10-CM

## 2018-04-28 LAB
ANION GAP SERPL CALC-SCNC: 14 MMOL/L — SIGNIFICANT CHANGE UP (ref 5–17)
BUN SERPL-MCNC: 89 MG/DL — HIGH (ref 7–23)
CALCIUM SERPL-MCNC: 8.3 MG/DL — LOW (ref 8.4–10.5)
CHLORIDE SERPL-SCNC: 104 MMOL/L — SIGNIFICANT CHANGE UP (ref 96–108)
CO2 SERPL-SCNC: 26 MMOL/L — SIGNIFICANT CHANGE UP (ref 22–31)
CREAT SERPL-MCNC: 3.06 MG/DL — HIGH (ref 0.5–1.3)
GLUCOSE SERPL-MCNC: 107 MG/DL — HIGH (ref 70–99)
HCT VFR BLD CALC: 31.3 % — LOW (ref 39–50)
HGB BLD-MCNC: 10.6 G/DL — LOW (ref 13–17)
INR BLD: 2.22 RATIO — HIGH (ref 0.88–1.16)
MCHC RBC-ENTMCNC: 30.3 PG — SIGNIFICANT CHANGE UP (ref 27–34)
MCHC RBC-ENTMCNC: 34 GM/DL — SIGNIFICANT CHANGE UP (ref 32–36)
MCV RBC AUTO: 89.1 FL — SIGNIFICANT CHANGE UP (ref 80–100)
PLATELET # BLD AUTO: 256 K/UL — SIGNIFICANT CHANGE UP (ref 150–400)
POTASSIUM SERPL-MCNC: 4.2 MMOL/L — SIGNIFICANT CHANGE UP (ref 3.5–5.3)
POTASSIUM SERPL-SCNC: 4.2 MMOL/L — SIGNIFICANT CHANGE UP (ref 3.5–5.3)
PROTHROM AB SERPL-ACNC: 24.4 SEC — HIGH (ref 9.8–12.7)
RBC # BLD: 3.52 M/UL — LOW (ref 4.2–5.8)
RBC # FLD: 16.1 % — HIGH (ref 10.3–14.5)
SODIUM SERPL-SCNC: 144 MMOL/L — SIGNIFICANT CHANGE UP (ref 135–145)
WBC # BLD: 7.6 K/UL — SIGNIFICANT CHANGE UP (ref 3.8–10.5)
WBC # FLD AUTO: 7.6 K/UL — SIGNIFICANT CHANGE UP (ref 3.8–10.5)

## 2018-04-28 PROCEDURE — 99233 SBSQ HOSP IP/OBS HIGH 50: CPT

## 2018-04-28 RX ORDER — WARFARIN SODIUM 2.5 MG/1
2.5 TABLET ORAL ONCE
Qty: 0 | Refills: 0 | Status: COMPLETED | OUTPATIENT
Start: 2018-04-28 | End: 2018-04-28

## 2018-04-28 RX ORDER — METOPROLOL TARTRATE 50 MG
37.5 TABLET ORAL
Qty: 0 | Refills: 0 | Status: DISCONTINUED | OUTPATIENT
Start: 2018-04-28 | End: 2018-04-30

## 2018-04-28 RX ORDER — FUROSEMIDE 40 MG
80 TABLET ORAL EVERY 12 HOURS
Qty: 0 | Refills: 0 | Status: DISCONTINUED | OUTPATIENT
Start: 2018-04-28 | End: 2018-04-29

## 2018-04-28 RX ADMIN — Medication 100 MILLIGRAM(S): at 21:24

## 2018-04-28 RX ADMIN — Medication 80 MILLIGRAM(S): at 18:00

## 2018-04-28 RX ADMIN — SENNA PLUS 2 TABLET(S): 8.6 TABLET ORAL at 21:24

## 2018-04-28 RX ADMIN — Medication 25 MILLIGRAM(S): at 18:01

## 2018-04-28 RX ADMIN — Medication 100 MILLIGRAM(S): at 13:11

## 2018-04-28 RX ADMIN — WARFARIN SODIUM 2.5 MILLIGRAM(S): 2.5 TABLET ORAL at 21:24

## 2018-04-28 RX ADMIN — Medication 80 MILLIGRAM(S): at 05:53

## 2018-04-28 RX ADMIN — Medication 25 MILLIGRAM(S): at 05:50

## 2018-04-28 RX ADMIN — Medication 100 MILLIGRAM(S): at 05:50

## 2018-04-28 RX ADMIN — FINASTERIDE 5 MILLIGRAM(S): 5 TABLET, FILM COATED ORAL at 09:24

## 2018-04-28 RX ADMIN — Medication 37.5 MILLIGRAM(S): at 18:01

## 2018-04-28 RX ADMIN — Medication 37.5 MILLIGRAM(S): at 05:50

## 2018-04-28 NOTE — PROVIDER CONTACT NOTE (OTHER) - ACTION/TREATMENT ORDERED:
ISAIAS Aragon agreed with recommendation. Will continue to monitor pt and maintain safety. ISAIAS Aragon agreed with recommendation and decreased dose of Metoprolol from 50mg to 37.5mg. Will continue to monitor pt and maintain safety.

## 2018-04-28 NOTE — PROGRESS NOTE ADULT - PROBLEM SELECTOR PLAN 4
The patient has been having proteinuria from before (old recrd in the chart from 6/13/17). DDx- Glomerular, Tubular, overflow or postrenal.  - He is anemia, Normal C3, C4, hepatitis panel, SPEP, UPEP. Lipid panel normal  - Sed rate 34, Free light chain, ANCA are pending. CT chest showed granuloma  - At his age doubt he would be a candidate for renal biopsy. Speak to Dr Bob and Family. Medical management for now  - No ACE/ARB for proteinuria due to TRAVIS on CKD 4 The patient has been having proteinuria from before (old record in the chart from 6/13/17). DDx- Glomerular, Tubular, overflow or postrenal.  - He is anemic, Normal C3, C4, hepatitis panel, SPEP, UPEP. Lipid panel normal  - Sed rate 34, Free light chain, ANCA are pending. CT chest showed granuloma  - Medical management per nephrology  - No ACE/ARB at this point for proteinuria due to TRAVIS on CKD 4

## 2018-04-28 NOTE — PROGRESS NOTE ADULT - PROBLEM SELECTOR PLAN 8
Initially he was in Hypertensive crisis during admission. BP has been better on Hydralazine and Metoprolol.  - Reviewed Echo Initially he was in Hypertensive crisis during admission. BP has been better on Hydralazine, Metoprolol, and diuresis with lasix.

## 2018-04-28 NOTE — PROGRESS NOTE ADULT - PROBLEM SELECTOR PLAN 1
TTE: Severe Pulmonary HTN, moderate AS with EF 75-80%.  - Has been on IV Lasix 80mg q 12hrs, Hydralazine 25mg bid, Metoprolol 37.5mg bid for WCT, Aberrancy  - Daily I/O, weight TTE: Severe Pulmonary HTN, moderate AS with EF 75-80%.  - Continue Lasix 80mg IV q12hrs, Hydralazine 25mg BID, Metoprolol 37.5mg bid for WCT, Aberrancy  - Daily I/O, weights

## 2018-04-28 NOTE — PROGRESS NOTE ADULT - PROBLEM SELECTOR PLAN 6
TOV today and will d/c hobbs if possible  - On Proscar. He had some reaction with Flomax ? cataract issue Rashaad valle/ana - seems to be urinating well without it.   Continue proscar; he had some reaction with Flomax ? cataract issue (floppy iris syndrome?)

## 2018-04-28 NOTE — PROGRESS NOTE ADULT - ASSESSMENT
87 y.o male w/ pmhx a.fib, diastolic CHF (last echo noted 4/2015: LV 60%, severe pulm. htn, mod. AS), HTN presenting w/ c/o of CP x 1 day with sob/chf as well as htn urgency    1- CKD IV suspected and progressive in nature given hx of baseline cr 2.56 in 2016 as documented  2- HTN   3- CHF  4- nephrotic syndrome     serologies are neg for  c-anca/p-anca MPO and anti-proteinase 3 assay   hydralazine to cont.   will need to change to arb soon given proteinuria   suspect ckd IV/proteinuria due to atrophic kidney and parenchymal loss   cont with lasix 80 mg iv bid and in am change to lasix 80 mg po bid   keep O> I   fluid status improving

## 2018-04-28 NOTE — PROGRESS NOTE ADULT - SUBJECTIVE AND OBJECTIVE BOX
Cardiology Weekend Coverage Note    CC:  HFpEF, AF, NSVT, Mod AS    Interval History: Bradycardia and 3.8 s pauses.  Patient's metoprolol had been increased from 25mg BID to 50 mg BID.    MEDICATIONS:  docusate sodium 100 milliGRAM(s) Oral three times a day  finasteride 5 milliGRAM(s) Oral daily  furosemide   Injectable 80 milliGRAM(s) IV Push every 12 hours  hydrALAZINE 25 milliGRAM(s) Oral every 12 hours  metoprolol tartrate 37.5 milliGRAM(s) Oral two times a day  senna 2 Tablet(s) Oral at bedtime  sodium chloride 0.65% Nasal 1 Spray(s) Both Nostrils every 2 hours PRN      LABS:      144  |  104  |  89<H>  ----------------------------<  107<H>  4.2   |  26  |  3.06<H>    Ca    8.3<L>      2018 07:04  Phos  4.4       Mg     1.9                                 10.6   7.6   )-----------( 256      ( 2018 07:04 )             31.3     PT/INR - ( 2018 07:04 )   PT: 24.4 sec;   INR: 2.22 ratio      VITAL SIGNS:   T(C): 36.8 (18 @ 09:27), Max: 36.9 (18 @ 04:23)  HR: 97 (18 @ 09:27) (55 - 97)  BP: 144/58 (18 @ 09:27) (126/71 - 158/80)  RR: 18 (18 @ 09:27) (16 - 18)  SpO2: 97% (18 @ 09:27) (94% - 97%)  Daily     Daily Weight in k.1 (2018 06:36)  I&O's Summary    2018 07:01  -  2018 07:00  --------------------------------------------------------  IN: 1360 mL / OUT: 50 mL / NET: 1310 mL    2018 07:01  -  2018 12:10  --------------------------------------------------------  IN: 360 mL / OUT: 500 mL / NET: -140 mL        TELE:  AF 60s currently with PVCs.  3.8 s AF pause.  Bradycardic to 30s-40s overnight.    Physical Exam:    Physical Exam:  General: Awake, NAD.    HEENT: NC/AT, no JVD.    Cor: S1, S2, systolic murmur, irregularly irregular.     Lungs: Clear bilaterally.    Abd: Soft, N/T, N/D.    Ext: Trace LE edema.      TTE (2018):  Conclusions:  1. Calcified trileaflet aortic valve with decreased  opening. Peak transaortic valve gradient equals 41 mm Hg,  mean transaortic valve gradient equals 22 mm Hg, estimated  aortic valve area equals 1.4 sqcm (by continuity equation),  aortic valve velocity time integral equals 62 cm,  consistent with moderate aortic stenosis. Minimal aortic  regurgitation.  2. Concentric left ventricular hypertrophy.  3. Hyperdynamic left ventricular systolic function.  4. Right ventricular enlargement with decreased right  ventricular systolic function.  5. Estimated pulmonary artery systolic pressure equals 63  mm Hg, assuming right atrial pressure equals 8 mm Hg,  consistent with severe pulmonary pressures.  *** No previous Echo exam. Technically difficult study.    Chest CT (2018):  IMPRESSION:   Cardiomegaly and mild prominence to the main pulmonary artery.   Small bilateral pleural effusions, right greater than left.  Multiple bilateral calcified granulomas and calcified right hilar lymph   nodes may be sequela of prior granulomatous infection.  Splenomegaly.

## 2018-04-28 NOTE — PROGRESS NOTE ADULT - SUBJECTIVE AND OBJECTIVE BOX
[facility]   [phone]  RENAL FOLLOW UP NOTE  --------------------------------------------------------------------------------  Chief Complaint:    24 hour events/subjective:    states doesnot know how he feels.   states breathing is improving slowly    PAST HISTORY  --------------------------------------------------------------------------------  No significant changes to PMH, PSH, FHx, SHx, unless otherwise noted    ALLERGIES & MEDICATIONS  --------------------------------------------------------------------------------  Allergies    No Known Allergies    Intolerances      Standing Inpatient Medications  docusate sodium 100 milliGRAM(s) Oral three times a day  finasteride 5 milliGRAM(s) Oral daily  furosemide   Injectable 80 milliGRAM(s) IV Push every 12 hours  hydrALAZINE 25 milliGRAM(s) Oral every 12 hours  metoprolol tartrate 37.5 milliGRAM(s) Oral two times a day  senna 2 Tablet(s) Oral at bedtime  warfarin 2.5 milliGRAM(s) Oral once    PRN Inpatient Medications  sodium chloride 0.65% Nasal 1 Spray(s) Both Nostrils every 2 hours PRN      REVIEW OF SYSTEMS  --------------------------------------------------------------------------------    Gen: denies fevers/chills,  CVS: denies chest pain/palpitations  Resp: denies worsening SOB/Cough  GI: Denies N/V/Abd pain  : Denies dysuria/oliguria/hematuria  no edema     All other systems were reviewed and are negative, except as noted.    VITALS/PHYSICAL EXAM  --------------------------------------------------------------------------------  T(C): 36.5 (04-28-18 @ 14:10), Max: 36.9 (04-28-18 @ 04:23)  HR: 79 (04-28-18 @ 14:10) (55 - 97)  BP: 99/61 (04-28-18 @ 14:10) (99/61 - 158/80)  RR: 18 (04-28-18 @ 14:10) (16 - 18)  SpO2: 97% (04-28-18 @ 14:10) (94% - 97%)  Wt(kg): --        04-27-18 @ 07:01  -  04-28-18 @ 07:00  --------------------------------------------------------  IN: 1360 mL / OUT: 50 mL / NET: 1310 mL    04-28-18 @ 07:01  -  04-28-18 @ 16:05  --------------------------------------------------------  IN: 840 mL / OUT: 500 mL / NET: 340 mL        Physical Exam:  	Gen: alert oriented place person and date   	Pulm: Decreased breath sounds b/l bases. no rales or ronchi or wheezing  	CV: RRR, S1/S2. no rub  	Back: No CVA tenderness; no sacral edema  	Abd: +BS, soft, nontender/nondistended  	: No suprapubic tenderness.               Extremity: No cyanosis, 2-- edema no clubbing  	    LABS/STUDIES  --------------------------------------------------------------------------------              10.6   7.6   >-----------<  256      [04-28-18 @ 07:04]              31.3     144  |  104  |  89  ----------------------------<  107      [04-28-18 @ 07:04]  4.2   |  26  |  3.06        Ca     8.3     [04-28-18 @ 07:04]      Mg     1.9     [04-27-18 @ 06:21]      Phos  4.4     [04-27-18 @ 06:21]      PT/INR: PT 24.4 , INR 2.22       [04-28-18 @ 07:04]      Creatinine Trend:  SCr 3.06 [04-28 @ 07:04]  SCr 3.21 [04-27 @ 06:21]  SCr 2.97 [04-26 @ 06:21]  SCr 3.02 [04-25 @ 06:07]  SCr 3.00 [04-24 @ 06:48]              Urinalysis - [04-24-18 @ 08:24]      Color Yellow / Appearance Clear / SG 1.012 / pH 5.5      Gluc Negative / Ketone Negative  / Bili Negative / Urobili Negative       Blood Negative / Protein 300 / Leuk Est Negative / Nitrite Negative      RBC 0-2 / WBC 0-2 / Hyaline  / Gran  / Sq Epi  / Non Sq Epi  / Bacteria Few    Urine Creatinine 59      [04-24-18 @ 19:19]  Urine Protein 174      [04-26-18 @ 13:36]  Urine Sodium 62      [04-24-18 @ 06:48]  Urine Urea Nitrogen 463      [04-24-18 @ 07:54]  Urine Osmolality 376      [04-24-18 @ 06:48]    HbA1c 5.5      [04-25-18 @ 07:44]  TSH 2.32      [04-25-18 @ 07:53]  Lipid: chol 135, , HDL 25, LDL 76      [04-25-18 @ 07:55]    PUSHPA: titer Negative, pattern --      [04-25-18 @ 08:03]  C3 Complement 86      [04-25-18 @ 08:03]  C4 Complement 28      [04-25-18 @ 08:03]  Rheumatoid Factor <7      [04-25-18 @ 07:55]  ANCA: cANCA Negative, pANCA Negative, atypical ANCA Negative      [04-26-18 @ 07:45]  MPO-ANCA <5.0, interpretation: Negative      [04-26-18 @ 07:45]  PR3-ANCA <5.0, interpretation: Negative      [04-26-18 @ 07:45]  Free Light Chains: kappa 9.05, lambda 5.76, ratio = 1.57      [04-27 @ 10:13]  SPEP Interpretation: Normal Electrophoresis Pattern      [04-24-18 @ 19:22]  Immunofixation Urine: No Monoclonal Band Identified      [04-26-18 @ 13:36]  UPEP Interpretation: Mild Selective (Glomerular) Proteinuria      [04-26-18 @ 13:36]

## 2018-04-28 NOTE — PROVIDER CONTACT NOTE (OTHER) - ACTION/TREATMENT ORDERED:
MD aware. No new orders at this time. Pt remains on cardiac monitoring. Will continue to closely monitor.

## 2018-04-28 NOTE — CHART NOTE - NSCHARTNOTEFT_GEN_A_CORE
Notified by RN for 3.1 sec pause on telemetry. Patient seen and examined at bedside, found sleeping in NAD. Pt Notified by RN for 3.1 sec pause on telemetry. Patient seen and examined at bedside, found sleeping in NAD. Pt asymptomatic, denies chest pain, dizziness, lightheadedness, syncope, SOB. Of note, metoprolol was increased today from 25mg BID to 50mg BID today for WCT/aberrancy.     Vital Signs Last 24 Hrs  T(C): 36.7 (27 Apr 2018 23:20), Max: 37.1 (27 Apr 2018 04:27)  T(F): 98 (27 Apr 2018 23:20), Max: 98.8 (27 Apr 2018 04:27)  HR: 55 (27 Apr 2018 23:20) (55 - 80)  BP: 156/74 (27 Apr 2018 23:20) (132/75 - 158/80)  BP(mean): --  RR: 18 (27 Apr 2018 23:20) (16 - 18)  SpO2: 96% (27 Apr 2018 23:20) (94% - 96%)    - VSS. Will continue to monitor on telemetry.  - Decreased to metoprolol 37.5mg po BID.  - F/U with cardiology recs.  Will endorse to primary team in AM.    MICHELLE JimenezC  #[id number]

## 2018-04-28 NOTE — PROGRESS NOTE ADULT - ASSESSMENT
88 y/o man with AF on A/C, mod aortic stenosis, presents with chest pain atypical and shortness of breath.  --Appears to be doing well on diuresis--continue lasix 80mg for now per Nephrology--Cr has improved to 3.06.  --Monitor IS and Os, and daily weights;  TTE with hyperdynamic LVF--HFpEF.  --Telemetry findings noted--metoprolol has been decreased to 37.5 mg BID; would decrease further or discontinue to bradycardic or pauses.    --Given NSVT and presenting CP, would eventually consider ischemic evaluation once CHF and renal issues improve.    --Will follow. 86 y/o man with AF on A/C, mod aortic stenosis, presents with chest pain atypical and shortness of breath.  --Appears to be doing well on diuresis--continue lasix 80mg for now per Nephrology--Cr has improved to 3.06.  --Monitor IS and Os, and daily weights;  TTE with hyperdynamic LVF--HFpEF.  --Warfarin dosing for AF.  --Telemetry findings noted--metoprolol has been decreased to 37.5 mg BID; would decrease further or discontinue to bradycardic or pauses.    --Given NSVT and presenting CP, would eventually consider ischemic evaluation once CHF and renal issues improve.    --Will follow.

## 2018-04-28 NOTE — PROGRESS NOTE ADULT - PROBLEM SELECTOR PLAN 3
Renal function is worse today. , Cr is 3.21 (baseline 2.8). Renal US showed right renal atrophy. Has been having proteinuria. T2DM controlled with HbA1C 5.5%  - Renal consult appreciated from Dr Hema Bob. No plan for renal biopsy at this age  - He is anemia, Normal PUSHPA, C3, C4, hepatitis panel, SPEP, UPEP. Lipid panel normal  - Sed rate 34, Free light chain, ANCA are pending  - At his age doubt he would be a candidate for renal biopsy. Dr Bob (Renal) spoke to Daughter. Cr is 3.06 (baseline 2.8). Renal US showed right renal atrophy. Has been having proteinuria. T2DM controlled with HbA1C 5.5%  - Renal Dr. Hema Bob evaluated - no plan for renal biopsy at this age  - Avoid nephrotoxins, f/u renal

## 2018-04-28 NOTE — PROGRESS NOTE ADULT - SUBJECTIVE AND OBJECTIVE BOX
John Montenegro MD  ([facility] Hospitalist)  Pager [phone]  (During off hours please page [phone])      Patient is a 87y old  Male who presents with a chief complaint of CP (27 Apr 2018 18:39)      SUBJECTIVE / OVERNIGHT EVENTS: The patient has no complains, but states that he is concerned that he is on Oxygen supplementation - normally doesn't need O2. On telemetry has had a couple of episodes of pauses and bradycardia (asymptomatic).     MEDICATIONS  (STANDING):  docusate sodium 100 milliGRAM(s) Oral three times a day  finasteride 5 milliGRAM(s) Oral daily  furosemide   Injectable 80 milliGRAM(s) IV Push every 12 hours  hydrALAZINE 25 milliGRAM(s) Oral every 12 hours  metoprolol tartrate 37.5 milliGRAM(s) Oral two times a day  senna 2 Tablet(s) Oral at bedtime    MEDICATIONS  (PRN):  sodium chloride 0.65% Nasal 1 Spray(s) Both Nostrils every 2 hours PRN nasal dryness      Vital Signs Last 24 Hrs  T(C): 36.8 (28 Apr 2018 09:27), Max: 36.9 (28 Apr 2018 04:23)  T(F): 98.2 (28 Apr 2018 09:27), Max: 98.4 (28 Apr 2018 04:23)  HR: 97 (28 Apr 2018 09:27) (55 - 97)  BP: 144/58 (28 Apr 2018 09:27) (126/71 - 158/80)  BP(mean): --  RR: 18 (28 Apr 2018 09:27) (16 - 18)  SpO2: 97% (28 Apr 2018 09:27) (94% - 97%)  CAPILLARY BLOOD GLUCOSE        I&O's Summary    27 Apr 2018 07:01  -  28 Apr 2018 07:00  --------------------------------------------------------  IN: 1360 mL / OUT: 50 mL / NET: 1310 mL    28 Apr 2018 07:01  -  28 Apr 2018 13:02  --------------------------------------------------------  IN: 360 mL / OUT: 500 mL / NET: -140 mL        PHYSICAL EXAM:  GENERAL: NAD, well-developed  EYES: EOMI, conjunctiva and sclera clear  NECK: Supple, No JVD, no thyromegaly  CHEST/LUNG: RLL rales present  HEART: bradycardia, S1, S2, murmur auscultated  ABDOMEN: Soft, Nontender, Nondistended; Bowel sounds present  EXTREMITIES:  warm ext, + Peripheral Pulses, No clubbing, cyanosis, 2+ LE edema  NEURO: AAOx3, no focal deficit      LABS:                        10.6   7.6   )-----------( 256      ( 28 Apr 2018 07:04 )             31.3     04-28    144  |  104  |  89<H>  ----------------------------<  107<H>  4.2   |  26  |  3.06<H>    Ca    8.3<L>      28 Apr 2018 07:04  Phos  4.4     04-27  Mg     1.9     04-27    PT/INR - ( 28 Apr 2018 07:04 )   PT: 24.4 sec;   INR: 2.22 ratio        RADIOLOGY & ADDITIONAL TESTS:    Imaging Personally Reviewed:     Consultant(s) Notes Reviewed:     Care Discussed with Consultants/Other Providers: John Montenegro MD  ([facility] Hospitalist)  Pager [phone]  (During off hours please page [phone])      Patient is a 87y old  Male who presents with a chief complaint of CP (27 Apr 2018 18:39)      SUBJECTIVE / OVERNIGHT EVENTS: The patient has no complains, but states that he is concerned that he is on Oxygen supplementation - normally doesn't need O2. On telemetry has had a couple of episodes of pauses and bradycardia (asymptomatic).     MEDICATIONS  (STANDING):  docusate sodium 100 milliGRAM(s) Oral three times a day  finasteride 5 milliGRAM(s) Oral daily  furosemide   Injectable 80 milliGRAM(s) IV Push every 12 hours  hydrALAZINE 25 milliGRAM(s) Oral every 12 hours  metoprolol tartrate 37.5 milliGRAM(s) Oral two times a day  senna 2 Tablet(s) Oral at bedtime    MEDICATIONS  (PRN):  sodium chloride 0.65% Nasal 1 Spray(s) Both Nostrils every 2 hours PRN nasal dryness      Vital Signs Last 24 Hrs  T(C): 36.8 (28 Apr 2018 09:27), Max: 36.9 (28 Apr 2018 04:23)  T(F): 98.2 (28 Apr 2018 09:27), Max: 98.4 (28 Apr 2018 04:23)  HR: 97 (28 Apr 2018 09:27) (55 - 97)  BP: 144/58 (28 Apr 2018 09:27) (126/71 - 158/80)  BP(mean): --  RR: 18 (28 Apr 2018 09:27) (16 - 18)  SpO2: 97% (28 Apr 2018 09:27) (94% - 97%)  CAPILLARY BLOOD GLUCOSE        I&O's Summary    27 Apr 2018 07:01  -  28 Apr 2018 07:00  --------------------------------------------------------  IN: 1360 mL / OUT: 50 mL / NET: 1310 mL    28 Apr 2018 07:01  -  28 Apr 2018 13:02  --------------------------------------------------------  IN: 360 mL / OUT: 500 mL / NET: -140 mL        PHYSICAL EXAM:  GENERAL: NAD, well-developed  EYES: EOMI, conjunctiva and sclera clear  NECK: Supple, No JVD, no thyromegaly  CHEST/LUNG: RLL rales present  HEART: bradycardia, S1, S2, murmur auscultated  ABDOMEN: Soft, Nontender, Nondistended; Bowel sounds present  EXTREMITIES:  warm ext, + Peripheral Pulses, No clubbing, cyanosis, 2+ LE edema  NEURO: AAOx3, no focal deficit      LABS:                        10.6   7.6   )-----------( 256      ( 28 Apr 2018 07:04 )             31.3     04-28    144  |  104  |  89<H>  ----------------------------<  107<H>  4.2   |  26  |  3.06<H>    Ca    8.3<L>      28 Apr 2018 07:04  Phos  4.4     04-27  Mg     1.9     04-27    PT/INR - ( 28 Apr 2018 07:04 )   PT: 24.4 sec;   INR: 2.22 ratio        RADIOLOGY & ADDITIONAL TESTS:    Telemetry personally reviewed: Atrial fibrillation with bradycardia (at time as low as 30s/ minutes) with 2 recorded episodes of pauses of 3.8 sec and 3.5 sec.     Consultant(s) Notes Reviewed: Cardiology     Care Discussed with Consultants/Other Providers: Cardiology re bradycardia and pauses.

## 2018-04-28 NOTE — PROGRESS NOTE ADULT - PROBLEM SELECTOR PLAN 5
Tele showed Rapid a.fib upto 110s with WCT and aberrancy. Ideally he would need stress test but with his renal dysfunction he would not be a candidate for Cath if stress test is abnormal  - Increased Metoprolol 50mg bid. c/w coumadin 2mg daily, Daily INR  - will monitor Tachycardia now resolved, but developed bradycardia with pauses on the higher dose of metoprolol - lowered to 37.5mg PO BID now. Cardiology following.  Ideally he would need stress test but with his renal dysfunction he would not be a candidate for Cath if stress test is abnormal  c/w coumadin 2mg daily, Daily INR  Will monitor

## 2018-04-28 NOTE — PROGRESS NOTE ADULT - PROBLEM SELECTOR PLAN 2
with 2 episodes of pauses up to 3.8 seconds.   Lopressor lowered from 50mg BID to 37.5 mg (given 1 dose this morning). Cardiology following - continue this lower dose of lopressor at this time and monitor on telemetry. If continued to have these pauses, will need to get EP evaluation.

## 2018-04-29 LAB
ANION GAP SERPL CALC-SCNC: 18 MMOL/L — HIGH (ref 5–17)
BASOPHILS # BLD AUTO: 0 K/UL — SIGNIFICANT CHANGE UP (ref 0–0.2)
BASOPHILS NFR BLD AUTO: 0.2 % — SIGNIFICANT CHANGE UP (ref 0–2)
BUN SERPL-MCNC: 94 MG/DL — HIGH (ref 7–23)
CALCIUM SERPL-MCNC: 8.4 MG/DL — SIGNIFICANT CHANGE UP (ref 8.4–10.5)
CHLORIDE SERPL-SCNC: 100 MMOL/L — SIGNIFICANT CHANGE UP (ref 96–108)
CO2 SERPL-SCNC: 25 MMOL/L — SIGNIFICANT CHANGE UP (ref 22–31)
CREAT SERPL-MCNC: 3.18 MG/DL — HIGH (ref 0.5–1.3)
EOSINOPHIL # BLD AUTO: 0.1 K/UL — SIGNIFICANT CHANGE UP (ref 0–0.5)
EOSINOPHIL NFR BLD AUTO: 1.2 % — SIGNIFICANT CHANGE UP (ref 0–6)
GLUCOSE SERPL-MCNC: 121 MG/DL — HIGH (ref 70–99)
HCT VFR BLD CALC: 33.6 % — LOW (ref 39–50)
HGB BLD-MCNC: 11.5 G/DL — LOW (ref 13–17)
INR BLD: 2.35 RATIO — HIGH (ref 0.88–1.16)
LYMPHOCYTES # BLD AUTO: 1.1 K/UL — SIGNIFICANT CHANGE UP (ref 1–3.3)
LYMPHOCYTES # BLD AUTO: 15.9 % — SIGNIFICANT CHANGE UP (ref 13–44)
MAGNESIUM SERPL-MCNC: 2 MG/DL — SIGNIFICANT CHANGE UP (ref 1.6–2.6)
MCHC RBC-ENTMCNC: 30.4 PG — SIGNIFICANT CHANGE UP (ref 27–34)
MCHC RBC-ENTMCNC: 34.1 GM/DL — SIGNIFICANT CHANGE UP (ref 32–36)
MCV RBC AUTO: 89 FL — SIGNIFICANT CHANGE UP (ref 80–100)
MONOCYTES # BLD AUTO: 0.6 K/UL — SIGNIFICANT CHANGE UP (ref 0–0.9)
MONOCYTES NFR BLD AUTO: 8.6 % — SIGNIFICANT CHANGE UP (ref 2–14)
NEUTROPHILS # BLD AUTO: 5.2 K/UL — SIGNIFICANT CHANGE UP (ref 1.8–7.4)
NEUTROPHILS NFR BLD AUTO: 74 % — SIGNIFICANT CHANGE UP (ref 43–77)
PHOSPHATE SERPL-MCNC: 5.2 MG/DL — HIGH (ref 2.5–4.5)
PLATELET # BLD AUTO: 277 K/UL — SIGNIFICANT CHANGE UP (ref 150–400)
POTASSIUM SERPL-MCNC: 4.1 MMOL/L — SIGNIFICANT CHANGE UP (ref 3.5–5.3)
POTASSIUM SERPL-SCNC: 4.1 MMOL/L — SIGNIFICANT CHANGE UP (ref 3.5–5.3)
PROTHROM AB SERPL-ACNC: 26.1 SEC — HIGH (ref 9.8–12.7)
RBC # BLD: 3.78 M/UL — LOW (ref 4.2–5.8)
RBC # FLD: 15.9 % — HIGH (ref 10.3–14.5)
SODIUM SERPL-SCNC: 143 MMOL/L — SIGNIFICANT CHANGE UP (ref 135–145)
WBC # BLD: 7.1 K/UL — SIGNIFICANT CHANGE UP (ref 3.8–10.5)
WBC # FLD AUTO: 7.1 K/UL — SIGNIFICANT CHANGE UP (ref 3.8–10.5)

## 2018-04-29 PROCEDURE — 78582 LUNG VENTILAT&PERFUS IMAGING: CPT | Mod: 26

## 2018-04-29 PROCEDURE — 71045 X-RAY EXAM CHEST 1 VIEW: CPT | Mod: 26

## 2018-04-29 PROCEDURE — 99233 SBSQ HOSP IP/OBS HIGH 50: CPT

## 2018-04-29 RX ORDER — FUROSEMIDE 40 MG
80 TABLET ORAL
Qty: 0 | Refills: 0 | Status: DISCONTINUED | OUTPATIENT
Start: 2018-04-29 | End: 2018-05-08

## 2018-04-29 RX ORDER — WARFARIN SODIUM 2.5 MG/1
2.5 TABLET ORAL ONCE
Qty: 0 | Refills: 0 | Status: COMPLETED | OUTPATIENT
Start: 2018-04-29 | End: 2018-04-29

## 2018-04-29 RX ADMIN — Medication 25 MILLIGRAM(S): at 05:32

## 2018-04-29 RX ADMIN — Medication 25 MILLIGRAM(S): at 17:13

## 2018-04-29 RX ADMIN — FINASTERIDE 5 MILLIGRAM(S): 5 TABLET, FILM COATED ORAL at 09:18

## 2018-04-29 RX ADMIN — WARFARIN SODIUM 2.5 MILLIGRAM(S): 2.5 TABLET ORAL at 21:10

## 2018-04-29 RX ADMIN — SENNA PLUS 2 TABLET(S): 8.6 TABLET ORAL at 21:10

## 2018-04-29 RX ADMIN — Medication 100 MILLIGRAM(S): at 21:09

## 2018-04-29 RX ADMIN — Medication 37.5 MILLIGRAM(S): at 05:32

## 2018-04-29 RX ADMIN — Medication 100 MILLIGRAM(S): at 05:32

## 2018-04-29 RX ADMIN — Medication 80 MILLIGRAM(S): at 05:32

## 2018-04-29 RX ADMIN — Medication 80 MILLIGRAM(S): at 17:13

## 2018-04-29 RX ADMIN — Medication 100 MILLIGRAM(S): at 13:19

## 2018-04-29 RX ADMIN — Medication 37.5 MILLIGRAM(S): at 17:11

## 2018-04-29 NOTE — PROGRESS NOTE ADULT - PROBLEM SELECTOR PLAN 1
TTE: Severe Pulmonary HTN, moderate AS with EF 75-80%.  - Nephrology and cardiology are following.   - Changed to Lasix 80mg PO q12hrs today, c/w Hydralazine 25mg BID, Metoprolol 37.5mg BID for WCT, Aberrancy  - Remains bradycardic, dropped to 36 bpm once overnight. Consult EP for PPM evaluation.   - The patient remains hypoxic pulse Ox 86% on RA. Acute respiratory failure with hypoxia. Lungs are clear to auscultation. TTE with decreased RV systolic function. Will obtain VQ scan to r/o pulmonary emboli. Patient understands and is agreeable to the test.   - Daily I/O, weights

## 2018-04-29 NOTE — PROGRESS NOTE ADULT - PROBLEM SELECTOR PLAN 3
Cr is 3.18 (baseline 2.8). Renal US showed right renal atrophy. Has been having proteinuria. T2DM controlled with HbA1C 5.5%  - Renal Dr. Hema Bob evaluated - no plan for renal biopsy at this age  - Avoid nephrotoxins, f/u renal recs

## 2018-04-29 NOTE — PROGRESS NOTE ADULT - PROBLEM SELECTOR PLAN 6
Rashaad valle/ana 2 days ago - seems to be urinating well without it.   Continue proscar; he had some reaction with Flomax ? cataract issue (floppy iris syndrome?)

## 2018-04-29 NOTE — PROGRESS NOTE ADULT - PROBLEM SELECTOR PLAN 5
LING zamora to be called today to help with management of rate control.  Ideally he would need stress test but with his renal dysfunction he would not be a candidate for Cath if stress test is abnormal  c/w coumadin 2.5mg daily, Daily INR (goal 2-3)

## 2018-04-29 NOTE — PROGRESS NOTE ADULT - PROBLEM SELECTOR PLAN 8
Initially he was in Hypertensive crisis during admission.   BP has been better on Hydralazine, Metoprolol, and diuresis with lasix.

## 2018-04-29 NOTE — PROGRESS NOTE ADULT - SUBJECTIVE AND OBJECTIVE BOX
John Montenegro MD  ([facility] Hospitalist)  Pager [phone]  (During off hours please page [phone])      Patient is a 87y old  Male who presents with a chief complaint of CP (27 Apr 2018 18:39)      SUBJECTIVE / OVERNIGHT EVENTS: No major events overnight per patient. Continued to be bradycardic on telemetry (asymptomatic).    MEDICATIONS  (STANDING):  docusate sodium 100 milliGRAM(s) Oral three times a day  finasteride 5 milliGRAM(s) Oral daily  furosemide    Tablet 80 milliGRAM(s) Oral two times a day  hydrALAZINE 25 milliGRAM(s) Oral every 12 hours  metoprolol tartrate 37.5 milliGRAM(s) Oral two times a day  senna 2 Tablet(s) Oral at bedtime  warfarin 2.5 milliGRAM(s) Oral once    MEDICATIONS  (PRN):  sodium chloride 0.65% Nasal 1 Spray(s) Both Nostrils every 2 hours PRN nasal dryness      Vital Signs Last 24 Hrs  T(C): 36.3 (29 Apr 2018 04:39), Max: 36.5 (28 Apr 2018 14:10)  T(F): 97.4 (29 Apr 2018 04:39), Max: 97.7 (28 Apr 2018 14:10)  HR: 78 (29 Apr 2018 04:39) (54 - 79)  BP: 155/78 (29 Apr 2018 04:39) (99/61 - 155/78)  BP(mean): --  RR: 18 (29 Apr 2018 04:39) (18 - 18)  SpO2: 99% (29 Apr 2018 04:39) (86% - 99%)  CAPILLARY BLOOD GLUCOSE        I&O's Summary    28 Apr 2018 07:01  -  29 Apr 2018 07:00  --------------------------------------------------------  IN: 1280 mL / OUT: 500 mL / NET: 780 mL    29 Apr 2018 07:01  -  29 Apr 2018 11:10  --------------------------------------------------------  IN: 0 mL / OUT: 400 mL / NET: -400 mL        PHYSICAL EXAM:  GENERAL: NAD, well-developed  EYES: EOMI, conjunctiva and sclera clear  NECK: Supple, No JVD, no thyromegaly  CHEST/LUNG: CTA b/l  HEART: bradycardia, irregular, S1, S2, murmur auscultated  ABDOMEN: Soft, Nontender, Nondistended; Bowel sounds present  EXTREMITIES:  warm ext, + Peripheral Pulses, No clubbing, cyanosis, 2+ LE edema  NEURO: AAOx3, no focal deficit    LABS:                        11.5   7.1   )-----------( 277      ( 29 Apr 2018 06:25 )             33.6     04-29    143  |  100  |  94<H>  ----------------------------<  121<H>  4.1   |  25  |  3.18<H>    Ca    8.4      29 Apr 2018 06:25  Phos  5.2     04-29  Mg     2.0     04-29      PT/INR - ( 29 Apr 2018 06:25 )   PT: 26.1 sec;   INR: 2.35 ratio           RADIOLOGY & ADDITIONAL TESTS:    Consultant(s) Notes Reviewed: Nephrology, Cardiology

## 2018-04-29 NOTE — PROGRESS NOTE ADULT - PROBLEM SELECTOR PLAN 2
On Metoprolol 37.5mg BID for WCT, Aberrancy, and Afib with RVR.   However is bradycardic, dropped to 36 bpm once overnight. Had 2 episodes of pauses up to 3.8 seconds 2 days ago.  Consult EP for PPM evaluation.

## 2018-04-29 NOTE — PROGRESS NOTE ADULT - PROBLEM SELECTOR PLAN 4
The patient has been having proteinuria from before (old record in the chart from 6/13/17). DDx- Glomerular, Tubular, overflow or postrenal.  - Medical management per nephrology  - Will initiate ACEi or ARB for proteinuria when cleared by renal.

## 2018-04-29 NOTE — PROGRESS NOTE ADULT - ASSESSMENT
87 y.o male w/ pmhx a.fib, diastolic CHF (last echo noted 4/2015: LV 60%, severe pulm. htn, mod. AS), HTN presenting w/ c/o of CP, SOB admitted for hypoxic respiratory failure and acute on chronic diastolic HF exacerbation most likely in setting of HTN urgency and worsening renal function, TRAVIS on CKD.

## 2018-04-30 LAB
ANION GAP SERPL CALC-SCNC: 11 MMOL/L — SIGNIFICANT CHANGE UP (ref 5–17)
BUN SERPL-MCNC: 96 MG/DL — HIGH (ref 7–23)
CALCIUM SERPL-MCNC: 8 MG/DL — LOW (ref 8.4–10.5)
CHLORIDE SERPL-SCNC: 103 MMOL/L — SIGNIFICANT CHANGE UP (ref 96–108)
CO2 SERPL-SCNC: 25 MMOL/L — SIGNIFICANT CHANGE UP (ref 22–31)
CREAT SERPL-MCNC: 3.16 MG/DL — HIGH (ref 0.5–1.3)
GLUCOSE SERPL-MCNC: 101 MG/DL — HIGH (ref 70–99)
HCT VFR BLD CALC: 29.9 % — LOW (ref 39–50)
HGB BLD-MCNC: 10.1 G/DL — LOW (ref 13–17)
INR BLD: 2.68 RATIO — HIGH (ref 0.88–1.16)
MAGNESIUM SERPL-MCNC: 2 MG/DL — SIGNIFICANT CHANGE UP (ref 1.6–2.6)
MCHC RBC-ENTMCNC: 29.9 PG — SIGNIFICANT CHANGE UP (ref 27–34)
MCHC RBC-ENTMCNC: 33.6 GM/DL — SIGNIFICANT CHANGE UP (ref 32–36)
MCV RBC AUTO: 88.9 FL — SIGNIFICANT CHANGE UP (ref 80–100)
PHOSPHATE SERPL-MCNC: 4.9 MG/DL — HIGH (ref 2.5–4.5)
PLATELET # BLD AUTO: 241 K/UL — SIGNIFICANT CHANGE UP (ref 150–400)
POTASSIUM SERPL-MCNC: 3.9 MMOL/L — SIGNIFICANT CHANGE UP (ref 3.5–5.3)
POTASSIUM SERPL-SCNC: 3.9 MMOL/L — SIGNIFICANT CHANGE UP (ref 3.5–5.3)
PROTHROM AB SERPL-ACNC: 29.5 SEC — HIGH (ref 9.8–12.7)
RBC # BLD: 3.36 M/UL — LOW (ref 4.2–5.8)
RBC # FLD: 15.9 % — HIGH (ref 10.3–14.5)
SODIUM SERPL-SCNC: 139 MMOL/L — SIGNIFICANT CHANGE UP (ref 135–145)
WBC # BLD: 7.9 K/UL — SIGNIFICANT CHANGE UP (ref 3.8–10.5)
WBC # FLD AUTO: 7.9 K/UL — SIGNIFICANT CHANGE UP (ref 3.8–10.5)

## 2018-04-30 PROCEDURE — 99233 SBSQ HOSP IP/OBS HIGH 50: CPT

## 2018-04-30 PROCEDURE — 99232 SBSQ HOSP IP/OBS MODERATE 35: CPT

## 2018-04-30 RX ORDER — METOPROLOL TARTRATE 50 MG
25 TABLET ORAL
Qty: 0 | Refills: 0 | Status: DISCONTINUED | OUTPATIENT
Start: 2018-04-30 | End: 2018-05-02

## 2018-04-30 RX ORDER — METOPROLOL TARTRATE 50 MG
25 TABLET ORAL
Qty: 0 | Refills: 0 | Status: DISCONTINUED | OUTPATIENT
Start: 2018-04-30 | End: 2018-04-30

## 2018-04-30 RX ORDER — WARFARIN SODIUM 2.5 MG/1
2 TABLET ORAL ONCE
Qty: 0 | Refills: 0 | Status: COMPLETED | OUTPATIENT
Start: 2018-04-30 | End: 2018-04-30

## 2018-04-30 RX ADMIN — WARFARIN SODIUM 2 MILLIGRAM(S): 2.5 TABLET ORAL at 21:55

## 2018-04-30 RX ADMIN — Medication 25 MILLIGRAM(S): at 05:25

## 2018-04-30 RX ADMIN — Medication 80 MILLIGRAM(S): at 05:25

## 2018-04-30 RX ADMIN — Medication 25 MILLIGRAM(S): at 17:24

## 2018-04-30 RX ADMIN — Medication 80 MILLIGRAM(S): at 17:23

## 2018-04-30 RX ADMIN — Medication 25 MILLIGRAM(S): at 17:22

## 2018-04-30 RX ADMIN — FINASTERIDE 5 MILLIGRAM(S): 5 TABLET, FILM COATED ORAL at 10:14

## 2018-04-30 RX ADMIN — Medication 100 MILLIGRAM(S): at 05:25

## 2018-04-30 NOTE — PROGRESS NOTE ADULT - SUBJECTIVE AND OBJECTIVE BOX
Patient is a 87y old  Male who presents with a chief complaint of chest pain.    SUBJECTIVE / OVERNIGHT EVENTS:  Sitting in chair, no SOB, feels somewhat ok, no chest pain, O2 sat down during ambulation, Tele- a.fib controlled HR, had 3 sec sinus pause      MEDICATIONS  (STANDING):  docusate sodium 100 milliGRAM(s) Oral three times a day  finasteride 5 milliGRAM(s) Oral daily  furosemide    Tablet 80 milliGRAM(s) Oral two times a day  hydrALAZINE 25 milliGRAM(s) Oral every 12 hours  metoprolol tartrate 25 milliGRAM(s) Oral two times a day  senna 2 Tablet(s) Oral at bedtime    MEDICATIONS  (PRN):  sodium chloride 0.65% Nasal 1 Spray(s) Both Nostrils every 2 hours PRN nasal dryness      Vital Signs Last 24 Hrs  T(C): 36.7 (30 Apr 2018 13:06), Max: 36.7 (30 Apr 2018 04:00)  T(F): 98.1 (30 Apr 2018 13:06), Max: 98.1 (30 Apr 2018 13:06)  HR: 85 (30 Apr 2018 13:06) (56 - 85)  BP: 137/73 (30 Apr 2018 13:06) (108/54 - 156/76)  BP(mean): --  RR: 18 (30 Apr 2018 13:06) (18 - 18)  SpO2: 99% (30 Apr 2018 13:06) (97% - 99%)  CAPILLARY BLOOD GLUCOSE        I&O's Summary    29 Apr 2018 07:01  -  30 Apr 2018 07:00  --------------------------------------------------------  IN: 900 mL / OUT: 1000 mL / NET: -100 mL    30 Apr 2018 07:01  -  30 Apr 2018 15:01  --------------------------------------------------------  IN: 0 mL / OUT: 650 mL / NET: -650 mL        PHYSICAL EXAM:-  GENERAL: NAD, well-developed  EYES: EOMI, PERRLA, conjunctiva and sclera clear  NECK: Supple, No JVD, no thyromegaly  CHEST/LUNG: Clear to auscultation bilaterally; No wheeze  HEART: Regular rate and rhythm; S1, S2 audible, No murmurs, rubs, or gallops  ABDOMEN: Soft, Nontender, Nondistended; Bowel sounds present  EXTREMITIES:  2+ Peripheral Pulses, No clubbing, cyanosis, or edema  NEURO: AAOx3, no focal deficit      LABS:                        10.1   7.9   )-----------( 241      ( 30 Apr 2018 06:14 )             29.9     04-30    139  |  103  |  96<H>  ----------------------------<  101<H>  3.9   |  25  |  3.16<H>    Ca    8.0<L>      30 Apr 2018 06:13  Phos  4.9     04-30  Mg     2.0     04-30      PT/INR - ( 30 Apr 2018 06:14 )   PT: 29.5 sec;   INR: 2.68 ratio       RADIOLOGY & ADDITIONAL TESTS:    Imaging Personally Reviewed: Echo  Consultant(s) Notes Reviewed: Card, Renal  Care Discussed with Consultants/Other Providers: card, Renal

## 2018-04-30 NOTE — PROGRESS NOTE ADULT - PROBLEM SELECTOR PLAN 2
Bradycardia and pauses noted over last 2 days.  - Spoke to Cardiologist, Metoprolol decreased to 25mg bid  - Might consult EP

## 2018-04-30 NOTE — PROGRESS NOTE ADULT - ASSESSMENT
87 M with AF on A/C, mod AS on last echo presents with chest pain atypical and shortness of breath likely acute on chronic HFpEF  ·	Continue to diurese. Symptoms improving.   ·	Continue present medications.   ·	D/w wife.     I will be away starting 5/1 and returning 5/4 . Faculty cardiology will cover. Please call [phone] with questions.   =======================================================================  Hermelindo Magana MD [facility]    Please page [phone] with questions  On nights and weekend please call the office [phone].

## 2018-04-30 NOTE — PROGRESS NOTE ADULT - PROBLEM SELECTOR PLAN 3
Cr is 3.18 (baseline 2.8). Renal US showed right renal atrophy. Has been having proteinuria. T2DM controlled with HbA1C 5.5%  - Dr. Hema Bob evaluated - no plan for renal biopsy at this age. Outpatient f/u with Renal. Daughter aware  - Avoid nephrotoxins, f/u renal recs

## 2018-04-30 NOTE — CHART NOTE - NSCHARTNOTEFT_GEN_A_CORE
Notified by RN for 3.06 sec pause on telemetry. Patient seen and examined at bedside, found sleeping in NAD. Pt asymptomatic, denies chest pain, dizziness, lightheadedness, syncope, SOB. Patient is on metoprolol 37.5 mg BID for atrial fibrillation.     Vital Signs Last 24 Hrs  T(C): 36.6 (30 Apr 2018 04:10), Max: 36.7 (30 Apr 2018 04:00)  T(F): 97.8 (30 Apr 2018 04:10), Max: 98 (30 Apr 2018 04:00)  HR: 66 (30 Apr 2018 04:10) (56 - 77)  BP: 108/54 (30 Apr 2018 04:10) (108/54 - 156/76)  BP(mean): --  RR: 18 (30 Apr 2018 04:10) (18 - 19)  SpO2: 98% (30 Apr 2018 04:10) (97% - 99%)      Recurrent Afib pause on telemetry  - VSS. Will continue to monitor on telemetry.  - Decreased metoprolol 25 mg PO BID with parameters.  - F/U with cardiology recommends on need for metoprolol   - Metoprolol to be held this morning, RN instructed to hold medication for pause overnight  - F/U AM lab work- BMP, Mg, Phos and replete electrolytes as needed   - Keep K>4, Mg>2   -Will endorse to primary team in AM.    Fawn Fontaine PA-C  [phone]

## 2018-04-30 NOTE — PROGRESS NOTE ADULT - PROBLEM SELECTOR PLAN 1
TTE: Severe Pulmonary HTN, moderate AS with EF 75-80%.  - Nephrology and cardiology are following.   - spoke to card- Dr Magana about the event (Sinus pause 3sec and bradycardia). c/w Metoprolol 25mg bid for now, Might need EP consult if persistent as per conversation  - Changed to Lasix 80mg PO q12hrs today, c/w Hydralazine 25mg BID,

## 2018-04-30 NOTE — PROVIDER CONTACT NOTE (OTHER) - ACTION/TREATMENT ORDERED:
ISAIAS Hansen reviewed pt's chart and decreased Metoprolol to 25mg BID. Will continue to monitor pt and maintain safety.

## 2018-04-30 NOTE — PROGRESS NOTE ADULT - SUBJECTIVE AND OBJECTIVE BOX
Cardiology Follow Up  ==========================================  CC: SOB, AF    HPI: Patient states he feels better today. 3 sec pause noted on tele. No symptoms.     Review Of Systems:  Negative except as documented above    Medications:    docusate sodium   100 milliGRAM(s) Oral (04-30-18 @ 05:25)   100 milliGRAM(s) Oral (04-29-18 @ 21:09)    finasteride   5 milliGRAM(s) Oral (04-30-18 @ 10:14)    furosemide    Tablet   80 milliGRAM(s) Oral (04-30-18 @ 17:23)   80 milliGRAM(s) Oral (04-30-18 @ 05:25)    hydrALAZINE   25 milliGRAM(s) Oral (04-30-18 @ 17:24)   25 milliGRAM(s) Oral (04-30-18 @ 05:25)    metoprolol tartrate   25 milliGRAM(s) Oral (04-30-18 @ 17:22)    senna   2 Tablet(s) Oral (04-29-18 @ 21:10)    warfarin   2.5 milliGRAM(s) Oral (04-29-18 @ 21:10)        Telemetry: AF. Pause as stated.     Vital Signs Last 24 Hrs  T(C): 36.7 (30 Apr 2018 13:06), Max: 36.7 (30 Apr 2018 04:00)  T(F): 98.1 (30 Apr 2018 13:06), Max: 98.1 (30 Apr 2018 13:06)  HR: 63 (30 Apr 2018 17:17) (56 - 85)  BP: 133/72 (30 Apr 2018 17:17) (108/54 - 137/73)  BP(mean): --  RR: 20 (30 Apr 2018 19:00) (18 - 20)  SpO2: 85% (30 Apr 2018 19:00) (85% - 99%)  I&O's Summary    29 Apr 2018 07:01  -  30 Apr 2018 07:00  --------------------------------------------------------  IN: 900 mL / OUT: 1000 mL / NET: -100 mL    30 Apr 2018 07:01  -  30 Apr 2018 19:19  --------------------------------------------------------  IN: 360 mL / OUT: 650 mL / NET: -290 mL        Physical Exam:  General: [x ] NAD  HEENT: [x ] EOMI [ x] PERRL  Cor: [x ] S1,S2 [x ] RRR [x ] No M/R/G   Lungs: [x ] CTA B/L [ x] Normal effort  Abd: [x ] Soft [x ] Non-tender [x ] +BS  Ext: [ x] No edema [x ] No cyanosis    Labs:                        10.1   7.9   )-----------( 241      ( 30 Apr 2018 06:14 )             29.9     04-30    139  |  103  |  96<H>  ----------------------------<  101<H>  3.9   |  25  |  3.16<H>    Ca    8.0<L>      30 Apr 2018 06:13  Phos  4.9     04-30  Mg     2.0     04-30

## 2018-04-30 NOTE — PROGRESS NOTE ADULT - ASSESSMENT
87 y.o male w/ pmhx a.fib, diastolic CHF (last echo noted 4/2015: LV 60%, severe pulm. htn, mod. 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   will need to change to arb soon given proteinuria   suspect ckd IV/proteinuria due to atrophic kidney and parenchymal loss   lasix 80 mg po bid   keep O> I   fluid status improving  daily weights  cont with b-b

## 2018-04-30 NOTE — PROGRESS NOTE ADULT - SUBJECTIVE AND OBJECTIVE BOX
[facility]   [phone]  RENAL FOLLOW UP NOTE  --------------------------------------------------------------------------------  Chief Complaint:    24 hour events/subjective:    seen earlier and case d/w primary hosp team   pt still with c/o some cifuentes but states better than before    PAST HISTORY  --------------------------------------------------------------------------------  No significant changes to PMH, PSH, FHx, SHx, unless otherwise noted    ALLERGIES & MEDICATIONS  --------------------------------------------------------------------------------  Allergies    No Known Allergies    Intolerances      Standing Inpatient Medications  docusate sodium 100 milliGRAM(s) Oral three times a day  finasteride 5 milliGRAM(s) Oral daily  furosemide    Tablet 80 milliGRAM(s) Oral two times a day  hydrALAZINE 25 milliGRAM(s) Oral every 12 hours  metoprolol tartrate 25 milliGRAM(s) Oral two times a day  senna 2 Tablet(s) Oral at bedtime  warfarin 2 milliGRAM(s) Oral once    PRN Inpatient Medications  sodium chloride 0.65% Nasal 1 Spray(s) Both Nostrils every 2 hours PRN      REVIEW OF SYSTEMS  --------------------------------------------------------------------------------    Gen: denies fatigue, fevers/chills,  CVS: denies chest pain/palpitations  Resp: + SOB/Cough+   GI: Denies N/V/Abd pain  : Denies dysuria/oliguria/hematuria    All other systems were reviewed and are negative, except as noted.    VITALS/PHYSICAL EXAM  --------------------------------------------------------------------------------  T(C): 36.7 (04-30-18 @ 13:06), Max: 36.7 (04-30-18 @ 04:00)  HR: 63 (04-30-18 @ 17:17) (56 - 85)  BP: 133/72 (04-30-18 @ 17:17) (108/54 - 137/73)  RR: 20 (04-30-18 @ 19:00) (18 - 20)  SpO2: 85% (04-30-18 @ 19:00) (85% - 99%)  Wt(kg): --        04-29-18 @ 07:01  -  04-30-18 @ 07:00  --------------------------------------------------------  IN: 900 mL / OUT: 1000 mL / NET: -100 mL    04-30-18 @ 07:01  -  04-30-18 @ 20:09  --------------------------------------------------------  IN: 360 mL / OUT: 650 mL / NET: -290 mL      Physical Exam:  	Gen: alert oriented place person and date   	Pulm: Decreased breath sounds b/l bases. no rales or ronchi or wheezing  	CV: RRR, S1/S2. no rub  	Back: No CVA tenderness; no sacral edema  	Abd: +BS, soft, nontender/nondistended  	: No suprapubic tenderness.               Extremity: No cyanosis, 1-2- edema no clubbing    LABS/STUDIES  --------------------------------------------------------------------------------              10.1   7.9   >-----------<  241      [04-30-18 @ 06:14]              29.9     139  |  103  |  96  ----------------------------<  101      [04-30-18 @ 06:13]  3.9   |  25  |  3.16        Ca     8.0     [04-30-18 @ 06:13]      Mg     2.0     [04-30-18 @ 06:13]      Phos  4.9     [04-30-18 @ 06:13]      PT/INR: PT 29.5 , INR 2.68       [04-30-18 @ 06:14]      Creatinine Trend:  SCr 3.16 [04-30 @ 06:13]  SCr 3.18 [04-29 @ 06:25]  SCr 3.06 [04-28 @ 07:04]  SCr 3.21 [04-27 @ 06:21]  SCr 2.97 [04-26 @ 06:21]      Urinalysis - [04-24-18 @ 08:24]      Color Yellow / Appearance Clear / SG 1.012 / pH 5.5      Gluc Negative / Ketone Negative  / Bili Negative / Urobili Negative       Blood Negative / Protein 300 / Leuk Est Negative / Nitrite Negative      RBC 0-2 / WBC 0-2 / Hyaline  / Gran  / Sq Epi  / Non Sq Epi  / Bacteria Few    Urine Creatinine 59      [04-24-18 @ 19:19]  Urine Protein 174      [04-26-18 @ 13:36]  Urine Sodium 62      [04-24-18 @ 06:48]  Urine Urea Nitrogen 463      [04-24-18 @ 07:54]  Urine Osmolality 376      [04-24-18 @ 06:48]    HbA1c 5.5      [04-25-18 @ 07:44]  TSH 2.32      [04-25-18 @ 07:53]  Lipid: chol 135, , HDL 25, LDL 76      [04-25-18 @ 07:55]    PUSHPA: titer Negative, pattern --      [04-25-18 @ 08:03]  C3 Complement 86      [04-25-18 @ 08:03]  C4 Complement 28      [04-25-18 @ 08:03]  Rheumatoid Factor <7      [04-25-18 @ 07:55]  ANCA: cANCA Negative, pANCA Negative, atypical ANCA Negative      [04-26-18 @ 07:45]  MPO-ANCA <5.0, interpretation: Negative      [04-26-18 @ 07:45]  PR3-ANCA <5.0, interpretation: Negative      [04-26-18 @ 07:45]  Free Light Chains: kappa 9.05, lambda 5.76, ratio = 1.57      [04-27 @ 10:13]  SPEP Interpretation: Normal Electrophoresis Pattern      [04-24-18 @ 19:22]  Immunofixation Urine: No Monoclonal Band Identified      [04-26-18 @ 13:36]  UPEP Interpretation: Mild Selective (Glomerular) Proteinuria      [04-26-18 @ 13:36]

## 2018-05-01 LAB
ANION GAP SERPL CALC-SCNC: 12 MMOL/L — SIGNIFICANT CHANGE UP (ref 5–17)
BUN SERPL-MCNC: 100 MG/DL — HIGH (ref 7–23)
CALCIUM SERPL-MCNC: 7.8 MG/DL — LOW (ref 8.4–10.5)
CHLORIDE SERPL-SCNC: 102 MMOL/L — SIGNIFICANT CHANGE UP (ref 96–108)
CO2 SERPL-SCNC: 27 MMOL/L — SIGNIFICANT CHANGE UP (ref 22–31)
CREAT SERPL-MCNC: 3.13 MG/DL — HIGH (ref 0.5–1.3)
GLUCOSE SERPL-MCNC: 109 MG/DL — HIGH (ref 70–99)
HCT VFR BLD CALC: 28.9 % — LOW (ref 39–50)
HGB BLD-MCNC: 10.1 G/DL — LOW (ref 13–17)
INR BLD: 2.6 RATIO — HIGH (ref 0.88–1.16)
MAGNESIUM SERPL-MCNC: 2 MG/DL — SIGNIFICANT CHANGE UP (ref 1.6–2.6)
MCHC RBC-ENTMCNC: 30.9 PG — SIGNIFICANT CHANGE UP (ref 27–34)
MCHC RBC-ENTMCNC: 35.1 GM/DL — SIGNIFICANT CHANGE UP (ref 32–36)
MCV RBC AUTO: 88.2 FL — SIGNIFICANT CHANGE UP (ref 80–100)
PHOSPHATE SERPL-MCNC: 4.5 MG/DL — SIGNIFICANT CHANGE UP (ref 2.5–4.5)
PLATELET # BLD AUTO: 247 K/UL — SIGNIFICANT CHANGE UP (ref 150–400)
POTASSIUM SERPL-MCNC: 4.2 MMOL/L — SIGNIFICANT CHANGE UP (ref 3.5–5.3)
POTASSIUM SERPL-SCNC: 4.2 MMOL/L — SIGNIFICANT CHANGE UP (ref 3.5–5.3)
PROTHROM AB SERPL-ACNC: 28.9 SEC — HIGH (ref 9.8–12.7)
RBC # BLD: 3.28 M/UL — LOW (ref 4.2–5.8)
RBC # FLD: 15.7 % — HIGH (ref 10.3–14.5)
SODIUM SERPL-SCNC: 141 MMOL/L — SIGNIFICANT CHANGE UP (ref 135–145)
WBC # BLD: 6.9 K/UL — SIGNIFICANT CHANGE UP (ref 3.8–10.5)
WBC # FLD AUTO: 6.9 K/UL — SIGNIFICANT CHANGE UP (ref 3.8–10.5)

## 2018-05-01 PROCEDURE — 99233 SBSQ HOSP IP/OBS HIGH 50: CPT

## 2018-05-01 RX ORDER — WARFARIN SODIUM 2.5 MG/1
2 TABLET ORAL ONCE
Qty: 0 | Refills: 0 | Status: COMPLETED | OUTPATIENT
Start: 2018-05-01 | End: 2018-05-01

## 2018-05-01 RX ADMIN — Medication 25 MILLIGRAM(S): at 17:07

## 2018-05-01 RX ADMIN — Medication 80 MILLIGRAM(S): at 05:21

## 2018-05-01 RX ADMIN — FINASTERIDE 5 MILLIGRAM(S): 5 TABLET, FILM COATED ORAL at 13:04

## 2018-05-01 RX ADMIN — Medication 25 MILLIGRAM(S): at 05:21

## 2018-05-01 RX ADMIN — Medication 100 MILLIGRAM(S): at 13:04

## 2018-05-01 RX ADMIN — WARFARIN SODIUM 2 MILLIGRAM(S): 2.5 TABLET ORAL at 22:51

## 2018-05-01 RX ADMIN — Medication 80 MILLIGRAM(S): at 17:07

## 2018-05-01 RX ADMIN — SENNA PLUS 2 TABLET(S): 8.6 TABLET ORAL at 22:51

## 2018-05-01 RX ADMIN — Medication 100 MILLIGRAM(S): at 22:51

## 2018-05-01 NOTE — PROGRESS NOTE ADULT - SUBJECTIVE AND OBJECTIVE BOX
Patient is a 87y old  Male who presents with a chief complaint of Chest pain.    SUBJECTIVE / OVERNIGHT EVENTS:  Sitting in chair, no SOB, feels lot better, Tele- no acute event (no rosa or pauses)      MEDICATIONS  (STANDING):  docusate sodium 100 milliGRAM(s) Oral three times a day  finasteride 5 milliGRAM(s) Oral daily  furosemide    Tablet 80 milliGRAM(s) Oral two times a day  hydrALAZINE 25 milliGRAM(s) Oral every 12 hours  metoprolol tartrate 25 milliGRAM(s) Oral two times a day  senna 2 Tablet(s) Oral at bedtime  warfarin 2 milliGRAM(s) Oral once    MEDICATIONS  (PRN):  sodium chloride 0.65% Nasal 1 Spray(s) Both Nostrils every 2 hours PRN nasal dryness      Vital Signs Last 24 Hrs  T(C): 36.5 (01 May 2018 13:13), Max: 36.9 (30 Apr 2018 21:09)  T(F): 97.7 (01 May 2018 13:13), Max: 98.5 (30 Apr 2018 21:09)  HR: 81 (01 May 2018 13:13) (63 - 81)  BP: 147/74 (01 May 2018 13:13) (128/75 - 147/74)  BP(mean): --  RR: 18 (01 May 2018 13:13) (18 - 20)  SpO2: 93% (01 May 2018 13:13) (85% - 98%)  CAPILLARY BLOOD GLUCOSE        I&O's Summary    30 Apr 2018 07:01  -  01 May 2018 07:00  --------------------------------------------------------  IN: 510 mL / OUT: 650 mL / NET: -140 mL        PHYSICAL EXAM:-  GENERAL: NAD, well-developed  EYES: EOMI, PERRLA, conjunctiva and sclera clear  NECK: Supple, No JVD, no thyromegaly  CHEST/LUNG: Clear to auscultation bilaterally; No wheeze  HEART: Regular rate and rhythm; S1, S2 audible, No murmurs, rubs, or gallops  ABDOMEN: Soft, Nontender, Nondistended; Bowel sounds present  EXTREMITIES:  2+ Peripheral Pulses, No clubbing, cyanosis, or 2+ LE edema  NEURO: AAOx3, no focal deficit      LABS:                        10.1   6.9   )-----------( 247      ( 01 May 2018 05:53 )             28.9     05-01    141  |  102  |  100<H>  ----------------------------<  109<H>  4.2   |  27  |  3.13<H>    Ca    7.8<L>      01 May 2018 05:53  Phos  4.5     05-01  Mg     2.0     05-01      PT/INR - ( 01 May 2018 05:53 )   PT: 28.9 sec;   INR: 2.60 ratio       RADIOLOGY & ADDITIONAL TESTS:    Imaging Personally Reviewed: CXR, Renal US, Echo  Consultant(s) Notes Reviewed:  card, Renal  Care Discussed with Consultants/Other Providers: card, renal

## 2018-05-01 NOTE — PROGRESS NOTE ADULT - PROBLEM SELECTOR PLAN 1
No SOB now, has been walking in hallway, O2 sat 96% in Rest. reviewed labs, imaging TTE: Severe Pulmonary HTN, moderate AS with EF 75-80%.  - Nephrology and cardiology recommendation noted  - spoke to card- Dr Magana , observe on tele for pauses and bradycardia on metoprolol 25mg bid. If recurrence will call EP for possible PPM,   - C/W Lasix 80mg PO q12hrs today, Hydralazine 25mg BID, metoprolol 25mg bid  - daily I/O and weight

## 2018-05-01 NOTE — PROGRESS NOTE ADULT - SUBJECTIVE AND OBJECTIVE BOX
Progress Note:   · Provider Specialty	Cardiology	      · Subjective and Objective: 	  Cardiology Follow Up  ==========================================  CC: SOB, AF    Interval Events  - Doing well;   - no further pauses nor tachycardia     Review Of Systems:  REVIEW OF SYSTEMS:  Constitutional: No Fever, Fatigue, Weight Changes  Eyes: No Recent Vision Changes, Eye Pain  ENT: No Congestion, Ear Pain, Sore Throat  Endocrine: No Excess Sweating, Temperature Intolerance  Cardiovascular: No Chest Pain, Palpitations, Shortness of Breath, Pre-syncope, Syncope, LE Edema  Respiratory: No Cough, Congestion, Wheezing  Gastrointestinal: No Abdominal Pain, Nausea, Vomiting  Genitourinary: No dysuria, hematuria  Musculoskeletal: No Joint Pain, Swelling  Neurologic: No headaches, Imbalance, Focal Deficits  Skin: No rashes, hematoma, purprura      Medications:    docusate sodium   100 milliGRAM(s) Oral (04-30-18 @ 05:25)   100 milliGRAM(s) Oral (04-29-18 @ 21:09)    finasteride   5 milliGRAM(s) Oral (04-30-18 @ 10:14)    furosemide    Tablet   80 milliGRAM(s) Oral (04-30-18 @ 17:23)   80 milliGRAM(s) Oral (04-30-18 @ 05:25)    hydrALAZINE   25 milliGRAM(s) Oral (04-30-18 @ 17:24)   25 milliGRAM(s) Oral (04-30-18 @ 05:25)    metoprolol tartrate   25 milliGRAM(s) Oral (04-30-18 @ 17:22)    senna   2 Tablet(s) Oral (04-29-18 @ 21:10)    warfarin   2.5 milliGRAM(s) Oral (04-29-18 @ 21:10)        Telemetry: AF. Pause as stated.     Vital Signs Last 24 Hrs  T(C): 36.7 (30 Apr 2018 13:06), Max: 36.7 (30 Apr 2018 04:00)  T(F): 98.1 (30 Apr 2018 13:06), Max: 98.1 (30 Apr 2018 13:06)  HR: 63 (30 Apr 2018 17:17) (56 - 85)  BP: 133/72 (30 Apr 2018 17:17) (108/54 - 137/73)  BP(mean): --  RR: 20 (30 Apr 2018 19:00) (18 - 20)  SpO2: 85% (30 Apr 2018 19:00) (85% - 99%)  I&O's Summary    29 Apr 2018 07:01  -  30 Apr 2018 07:00  --------------------------------------------------------  IN: 900 mL / OUT: 1000 mL / NET: -100 mL    30 Apr 2018 07:01  -  30 Apr 2018 19:19  --------------------------------------------------------  IN: 360 mL / OUT: 650 mL / NET: -290 mL        Physical Exam:  General: [x ] NAD AOX3  HEENT: [x ] EOMI [ x] PERRL  Cor: [x ] S1,S2 [x ] RRR [x ] No M/R/G   Lungs: [x ] CTA B/L [ x] Normal effort  Abd: [x ] Soft [x ] Non-tender [x ] +BS  Ext: [ x] No edema [x ] No cyanosis  Neuro: Grossly Intact CNII-XII; No focal motor deficits  Abd: Grossly benign non-tender    Labs:                        10.1   7.9   )-----------( 241      ( 30 Apr 2018 06:14 )             29.9     04-30    139  |  103  |  96<H>  ----------------------------<  101<H>  3.9   |  25  |  3.16<H>    Ca    8.0<L>      30 Apr 2018 06:13  Phos  4.9     04-30  Mg     2.0     04-30        Assessment and Plan:   · Assessment		  87 M with AF on A/C, mod AS on last echo presents with chest pain atypical and shortness of breath likely acute on chronic HFpEF  ·	Continue to diurese. Symptoms improving.   ·	Continue present medications.   ·	Continue current medications  ·	Not LHC candidate  ·	If pauses, decrease lopressor; If tachycardic, needs, EP involvement Progress Note:   · Provider Specialty	Cardiology	      · Subjective and Objective: 	  Cardiology Follow Up  ==========================================  CC: SOB, AF    Interval Events  - Doing well;   - no further pauses nor tachycardia     Review Of Systems:  REVIEW OF SYSTEMS:  Constitutional: No Fever, Fatigue, Weight Changes  Eyes: No Recent Vision Changes, Eye Pain  ENT: No Congestion, Ear Pain, Sore Throat  Endocrine: No Excess Sweating, Temperature Intolerance  Cardiovascular: No Chest Pain, Palpitations, Shortness of Breath, Pre-syncope, Syncope, LE Edema  Respiratory: No Cough, Congestion, Wheezing  Gastrointestinal: No Abdominal Pain, Nausea, Vomiting  Genitourinary: No dysuria, hematuria  Musculoskeletal: No Joint Pain, Swelling  Neurologic: No headaches, Imbalance, Focal Deficits  Skin: No rashes, hematoma, purprura      Medications:    docusate sodium   100 milliGRAM(s) Oral (04-30-18 @ 05:25)   100 milliGRAM(s) Oral (04-29-18 @ 21:09)    finasteride   5 milliGRAM(s) Oral (04-30-18 @ 10:14)    furosemide    Tablet   80 milliGRAM(s) Oral (04-30-18 @ 17:23)   80 milliGRAM(s) Oral (04-30-18 @ 05:25)    hydrALAZINE   25 milliGRAM(s) Oral (04-30-18 @ 17:24)   25 milliGRAM(s) Oral (04-30-18 @ 05:25)    metoprolol tartrate   25 milliGRAM(s) Oral (04-30-18 @ 17:22)    senna   2 Tablet(s) Oral (04-29-18 @ 21:10)    warfarin   2.5 milliGRAM(s) Oral (04-29-18 @ 21:10)            Vital Signs Last 24 Hrs  T(C): 36.7 (30 Apr 2018 13:06), Max: 36.7 (30 Apr 2018 04:00)  T(F): 98.1 (30 Apr 2018 13:06), Max: 98.1 (30 Apr 2018 13:06)  HR: 63 (30 Apr 2018 17:17) (56 - 85)  BP: 133/72 (30 Apr 2018 17:17) (108/54 - 137/73)  BP(mean): --  RR: 20 (30 Apr 2018 19:00) (18 - 20)  SpO2: 85% (30 Apr 2018 19:00) (85% - 99%)  I&O's Summary    29 Apr 2018 07:01  -  30 Apr 2018 07:00  --------------------------------------------------------  IN: 900 mL / OUT: 1000 mL / NET: -100 mL    30 Apr 2018 07:01  -  30 Apr 2018 19:19  --------------------------------------------------------  IN: 360 mL / OUT: 650 mL / NET: -290 mL        Physical Exam:  General: [x ] NAD AOX3  HEENT: [x ] EOMI [ x] PERRL  Cor: [x ] S1,S2 [x ] RRR [x ] No M/R/G   Lungs: [x ] CTA B/L [ x] Normal effort  Abd: [x ] Soft [x ] Non-tender [x ] +BS  Ext: [ x] No edema [x ] No cyanosis  Neuro: Grossly Intact CNII-XII; No focal motor deficits  Abd: Grossly benign non-tender    Labs:                        10.1   7.9   )-----------( 241      ( 30 Apr 2018 06:14 )             29.9     04-30    139  |  103  |  96<H>  ----------------------------<  101<H>  3.9   |  25  |  3.16<H>    Ca    8.0<L>      30 Apr 2018 06:13  Phos  4.9     04-30  Mg     2.0     04-30        Assessment and Plan:   · Assessment		  87 M with AF on A/C, mod AS on last echo presents with chest pain atypical and shortness of breath likely acute on chronic HFpEF  ·	Continue to diurese. Symptoms improving.   ·	Continue present medications.   ·	Continue current medications  ·	Not LHC candidate  ·	If pauses, decrease lopressor; If tachycardic, needs, EP involvement

## 2018-05-01 NOTE — PROGRESS NOTE ADULT - PROBLEM SELECTOR PLAN 2
No pauses or bradycardis in last 24hrs  - Spoke to Cardiologist, rec to observe another 24hrs on Metoprolol 25mg bid  - If recurrence will call EP

## 2018-05-02 DIAGNOSIS — I48.91 UNSPECIFIED ATRIAL FIBRILLATION: ICD-10-CM

## 2018-05-02 DIAGNOSIS — I47.2 VENTRICULAR TACHYCARDIA: ICD-10-CM

## 2018-05-02 LAB
ANION GAP SERPL CALC-SCNC: 13 MMOL/L — SIGNIFICANT CHANGE UP (ref 5–17)
BUN SERPL-MCNC: 105 MG/DL — HIGH (ref 7–23)
CALCIUM SERPL-MCNC: 7.8 MG/DL — LOW (ref 8.4–10.5)
CHLORIDE SERPL-SCNC: 103 MMOL/L — SIGNIFICANT CHANGE UP (ref 96–108)
CO2 SERPL-SCNC: 25 MMOL/L — SIGNIFICANT CHANGE UP (ref 22–31)
CREAT SERPL-MCNC: 2.98 MG/DL — HIGH (ref 0.5–1.3)
GLUCOSE SERPL-MCNC: 105 MG/DL — HIGH (ref 70–99)
HCT VFR BLD CALC: 28.3 % — LOW (ref 39–50)
HGB BLD-MCNC: 9.7 G/DL — LOW (ref 13–17)
INR BLD: 2.31 RATIO — HIGH (ref 0.88–1.16)
MAGNESIUM SERPL-MCNC: 2 MG/DL — SIGNIFICANT CHANGE UP (ref 1.6–2.6)
MCHC RBC-ENTMCNC: 30.2 PG — SIGNIFICANT CHANGE UP (ref 27–34)
MCHC RBC-ENTMCNC: 34.2 GM/DL — SIGNIFICANT CHANGE UP (ref 32–36)
MCV RBC AUTO: 88.3 FL — SIGNIFICANT CHANGE UP (ref 80–100)
PHOSPHATE SERPL-MCNC: 4.1 MG/DL — SIGNIFICANT CHANGE UP (ref 2.5–4.5)
PLATELET # BLD AUTO: 253 K/UL — SIGNIFICANT CHANGE UP (ref 150–400)
POTASSIUM SERPL-MCNC: 4.2 MMOL/L — SIGNIFICANT CHANGE UP (ref 3.5–5.3)
POTASSIUM SERPL-SCNC: 4.2 MMOL/L — SIGNIFICANT CHANGE UP (ref 3.5–5.3)
PROTHROM AB SERPL-ACNC: 25.4 SEC — HIGH (ref 9.8–12.7)
RBC # BLD: 3.2 M/UL — LOW (ref 4.2–5.8)
RBC # FLD: 15.6 % — HIGH (ref 10.3–14.5)
SODIUM SERPL-SCNC: 141 MMOL/L — SIGNIFICANT CHANGE UP (ref 135–145)
WBC # BLD: 7.4 K/UL — SIGNIFICANT CHANGE UP (ref 3.8–10.5)
WBC # FLD AUTO: 7.4 K/UL — SIGNIFICANT CHANGE UP (ref 3.8–10.5)

## 2018-05-02 PROCEDURE — 99233 SBSQ HOSP IP/OBS HIGH 50: CPT

## 2018-05-02 PROCEDURE — 99221 1ST HOSP IP/OBS SF/LOW 40: CPT

## 2018-05-02 RX ORDER — WARFARIN SODIUM 2.5 MG/1
2 TABLET ORAL ONCE
Qty: 0 | Refills: 0 | Status: DISCONTINUED | OUTPATIENT
Start: 2018-05-02 | End: 2018-05-02

## 2018-05-02 RX ADMIN — Medication 100 MILLIGRAM(S): at 05:53

## 2018-05-02 RX ADMIN — Medication 25 MILLIGRAM(S): at 17:19

## 2018-05-02 RX ADMIN — Medication 100 MILLIGRAM(S): at 21:15

## 2018-05-02 RX ADMIN — FINASTERIDE 5 MILLIGRAM(S): 5 TABLET, FILM COATED ORAL at 13:01

## 2018-05-02 RX ADMIN — Medication 100 MILLIGRAM(S): at 13:01

## 2018-05-02 RX ADMIN — SENNA PLUS 2 TABLET(S): 8.6 TABLET ORAL at 21:15

## 2018-05-02 RX ADMIN — Medication 80 MILLIGRAM(S): at 05:51

## 2018-05-02 RX ADMIN — Medication 25 MILLIGRAM(S): at 05:51

## 2018-05-02 RX ADMIN — Medication 80 MILLIGRAM(S): at 17:19

## 2018-05-02 NOTE — PROGRESS NOTE ADULT - SUBJECTIVE AND OBJECTIVE BOX
[facility]   [phone]  RENAL FOLLOW UP NOTE  --------------------------------------------------------------------------------  Chief Complaint:    24 hour events/subjective:    states had palp earlier.     PAST HISTORY  --------------------------------------------------------------------------------  No significant changes to PMH, PSH, FHx, SHx, unless otherwise noted    ALLERGIES & MEDICATIONS  --------------------------------------------------------------------------------  Allergies    No Known Allergies    Intolerances      Standing Inpatient Medications  docusate sodium 100 milliGRAM(s) Oral three times a day  finasteride 5 milliGRAM(s) Oral daily  furosemide    Tablet 80 milliGRAM(s) Oral two times a day  hydrALAZINE 25 milliGRAM(s) Oral every 12 hours  senna 2 Tablet(s) Oral at bedtime    PRN Inpatient Medications  sodium chloride 0.65% Nasal 1 Spray(s) Both Nostrils every 2 hours PRN      REVIEW OF SYSTEMS  --------------------------------------------------------------------------------    Gen: denies fevers/chills,  CVS: denies chest pain/palpitations  Resp: denies worsening SOB/Cough  GI: Denies N/V/Abd pain  : Denies dysuria/oliguria/hematuria  + states edema is better.     All other systems were reviewed and are negative, except as noted.    VITALS/PHYSICAL EXAM  --------------------------------------------------------------------------------  T(C): 36.6 (05-02-18 @ 21:00), Max: 36.6 (05-02-18 @ 21:00)  HR: 95 (05-02-18 @ 21:00) (66 - 95)  BP: 162/79 (05-02-18 @ 21:00) (122/68 - 162/79)  RR: 18 (05-02-18 @ 21:00) (18 - 18)  SpO2: 92% (05-02-18 @ 21:00) (92% - 94%)  Wt(kg): --        05-01-18 @ 07:01  -  05-02-18 @ 07:00  --------------------------------------------------------  IN: 960 mL / OUT: 0 mL / NET: 960 mL    05-02-18 @ 07:01  -  05-02-18 @ 22:40  --------------------------------------------------------  IN: 480 mL / OUT: 0 mL / NET: 480 mL      Physical Exam:  	  Gen: alert oriented place person and date   	Pulm: Decreased breath sounds b/l bases. no rales or ronchi or wheezing  	CV: RRR, S1/S2. no rub  	Back: No CVA tenderness; no sacral edema  	Abd: +BS, soft, nontender/nondistended  	: No suprapubic tenderness.               Extremity: No cyanosis, 1-2- edema no clubbing    LABS/STUDIES	  --------------------------------------------------------------------------------              9.7    7.4   >-----------<  253      [05-02-18 @ 06:26]              28.3     141  |  103  |  105  ----------------------------<  105      [05-02-18 @ 06:25]  4.2   |  25  |  2.98        Ca     7.8     [05-02-18 @ 06:25]      Mg     2.0     [05-02-18 @ 06:25]      Phos  4.1     [05-02-18 @ 06:25]      PT/INR: PT 25.4 , INR 2.31       [05-02-18 @ 06:26]      Creatinine Trend:  SCr 2.98 [05-02 @ 06:25]  SCr 3.13 [05-01 @ 05:53]  SCr 3.16 [04-30 @ 06:13]  SCr 3.18 [04-29 @ 06:25]  SCr 3.06 [04-28 @ 07:04]      Urinalysis - [04-24-18 @ 08:24]      Color Yellow / Appearance Clear / SG 1.012 / pH 5.5      Gluc Negative / Ketone Negative  / Bili Negative / Urobili Negative       Blood Negative / Protein 300 / Leuk Est Negative / Nitrite Negative      RBC 0-2 / WBC 0-2 / Hyaline  / Gran  / Sq Epi  / Non Sq Epi  / Bacteria Few    Urine Protein 174      [04-26-18 @ 13:36]    HbA1c 5.5      [04-25-18 @ 07:44]  TSH 2.32      [04-25-18 @ 07:53]  Lipid: chol 135, , HDL 25, LDL 76      [04-25-18 @ 07:55]    PUSHPA: titer Negative, pattern --      [04-25-18 @ 08:03]  C3 Complement 86      [04-25-18 @ 08:03]  C4 Complement 28      [04-25-18 @ 08:03]  Rheumatoid Factor <7      [04-25-18 @ 07:55]  ANCA: cANCA Negative, pANCA Negative, atypical ANCA Negative      [04-26-18 @ 07:45]  MPO-ANCA <5.0, interpretation: Negative      [04-26-18 @ 07:45]  PR3-ANCA <5.0, interpretation: Negative      [04-26-18 @ 07:45]  Free Light Chains: kappa 9.05, lambda 5.76, ratio = 1.57      [04-27 @ 10:13]  SPEP Interpretation: Normal Electrophoresis Pattern      [04-24-18 @ 19:22]  Immunofixation Urine: No Monoclonal Band Identified      [04-26-18 @ 13:36]  UPEP Interpretation: Mild Selective (Glomerular) Proteinuria      [04-26-18 @ 13:36]

## 2018-05-02 NOTE — PROGRESS NOTE ADULT - PROBLEM SELECTOR PLAN 4
The patient has been having proteinuria from before (old record in the chart from 6/13/17). DDx- Glomerular, Tubular, overflow or postrenal.  - Medical management per nephrology  - ACEI or ARB on hold for TRAVIS.

## 2018-05-02 NOTE — CONSULT NOTE ADULT - SUBJECTIVE AND OBJECTIVE BOX
CHIEF COMPLAINT:    HISTORY OF PRESENT ILLNESS:      Allergies    No Known Allergies    Intolerances    	    MEDICATIONS:  furosemide    Tablet 80 milliGRAM(s) Oral two times a day  hydrALAZINE 25 milliGRAM(s) Oral every 12 hours  docusate sodium 100 milliGRAM(s) Oral three times a day  senna 2 Tablet(s) Oral at bedtime  finasteride 5 milliGRAM(s) Oral daily  sodium chloride 0.65% Nasal 1 Spray(s) Both Nostrils every 2 hours PRN      PAST MEDICAL & SURGICAL HISTORY:  Congestive heart failure  HTN (hypertension)  Atrial fibrillation  No significant past surgical history      FAMILY HISTORY:  No pertinent family history in first degree relatives      SOCIAL HISTORY:    [ ] Non-smoker  [ ] Smoker  [ ] Alcohol      REVIEW OF SYSTEMS:  See HPI. Otherwise, 10 point ROS done and otherwise negative.    PHYSICAL EXAM:  T(C): 36.4 (05-02-18 @ 13:08), Max: 36.6 (05-01-18 @ 22:30)  HR: 66 (05-02-18 @ 13:08) (55 - 79)  BP: 148/65 (05-02-18 @ 13:08) (122/68 - 149/73)  RR: 18 (05-02-18 @ 13:08) (18 - 18)  SpO2: 92% (05-02-18 @ 13:08) (92% - 94%)  Wt(kg): --  I&O's Summary    01 May 2018 07:01  -  02 May 2018 07:00  --------------------------------------------------------  IN: 960 mL / OUT: 0 mL / NET: 960 mL    02 May 2018 07:01  -  02 May 2018 15:13  --------------------------------------------------------  IN: 360 mL / OUT: 0 mL / NET: 360 mL        Appearance: Normal	  HEENT:   Normal oral mucosa, PERRL, EOMI	  Lymphatic: No lymphadenopathy  Cardiovascular: Normal S1 S2, No JVD, No murmurs, No edema  Respiratory: Lungs clear to auscultation	  Psychiatry: A & O x 3, Mood & affect appropriate  Gastrointestinal:  Soft, Non-tender, + BS	  Skin: No rashes, No ecchymoses, No cyanosis	  Neurologic: Non-focal  Extremities: Normal range of motion, No clubbing, cyanosis or edema  Vascular: Peripheral pulses palpable 2+ bilaterally        LABS:	 	    CBC Full  -  ( 02 May 2018 06:26 )  WBC Count : 7.4 K/uL  Hemoglobin : 9.7 g/dL  Hematocrit : 28.3 %  Platelet Count - Automated : 253 K/uL  Mean Cell Volume : 88.3 fl  Mean Cell Hemoglobin : 30.2 pg  Mean Cell Hemoglobin Concentration : 34.2 gm/dL  Auto Neutrophil # : x  Auto Lymphocyte # : x  Auto Monocyte # : x  Auto Eosinophil # : x  Auto Basophil # : x  Auto Neutrophil % : x  Auto Lymphocyte % : x  Auto Monocyte % : x  Auto Eosinophil % : x  Auto Basophil % : x    05-02    141  |  103  |  105<H>  ----------------------------<  105<H>  4.2   |  25  |  2.98<H>  05-01    141  |  102  |  100<H>  ----------------------------<  109<H>  4.2   |  27  |  3.13<H>    Ca    7.8<L>      02 May 2018 06:25  Ca    7.8<L>      01 May 2018 05:53  Phos  4.1     05-02  Phos  4.5     05-01  Mg     2.0     05-02  Mg     2.0     05-01    Study Date: 4/26/2018    PROCEDURE: Transthoracic echocardiogram with 2-D, M-Mode  and complete spectral and color flow Doppler.  INDICATION: Shortness of breath (R06.02)  ------------------------------------------------------------------------  Dimensions:    Normal Values:  LA:     5.3    2.0 - 4.0 cm  Ao:     4.0    2.0 - 3.8 cm  SEPTUM: 1.3    0.6 - 1.2 cm  PWT:    1.4    0.6 - 1.1 cm  LVIDd:  5.7    3.0 - 5.6 cm  LVIDs:  4.2    1.8 - 4.0 cm  Derived variables:  LVMI: 174 g/m2  RWT: 0.49  EF (Visual Estimate): 75-80 %  Doppler Peak Velocity (m/sec): AoV=3.2    ------------------------------------------------------------------------  Observations:  Mitral Valve: Mitral annular calcification. Mild mitral  regurgitation.  Aortic Valve/Aorta: Calcified trileaflet aortic valve with  decreased opening. Peak transaortic valve gradient equals  41 mm Hg, mean transaortic valve gradient equals 22 mm Hg,  estimated aortic valve area equals 1.4 sqcm (by continuity  equation), aortic valve velocity time integral equals 62  cm, consistent with moderate aortic stenosis. Minimal  aortic regurgitation.  Mean gradient is equal to 2 mm Hg,  LVOT velocity time integral equals 21 cm.  Mild aortic root dilatation  (Ao: 4.0 cm at the sinuses of  Valsalva). LVOT diameter: 2.3 cm.  Left Atrium: Severely dilated left atrium.  LA volume index  = 78 cc/m2.  Left Ventricle: Hyperdynamic left ventricular systolic  function. Concentric left ventricular hypertrophy.  Right Heart: Severe right atrial enlargement. Right  ventricular enlargement with decreased right ventricular  systolic function. Normal tricuspid valve. Mild-moderate  tricuspid regurgitation. Pulmonic valve not well  visualized, probably normal. Minimal pulmonic  regurgitation.  Pericardium/Pleura: No pericardial effusion seen.  Hemodynamic: Estimated right atrial pressure is 8 mm Hg.  Estimated right ventricular systolic pressure equals 63 mm  Hg, assuming right atrial pressure equals 8 mm Hg,  consistent with severe pulmonary hypertension.  ------------------------------------------------------------------------    Conclusions:  1. Calcified trileaflet aortic valve with decreased  opening. Peak transaortic valve gradient equals 41 mm Hg,  mean transaortic valve gradient equals 22 mm Hg, estimated  aortic valve area equals 1.4 sqcm (by continuity equation),  aortic valve velocity time integral equals 62 cm,  consistentwith moderate aortic stenosis. Minimal aortic  regurgitation.  2. Concentric left ventricular hypertrophy.  3. Hyperdynamic left ventricular systolic function.  4. Right ventricular enlargement with decreased right  ventricular systolic function.  5.Estimated pulmonary artery systolic pressure equals 63  mm Hg, assuming right atrial pressure equals 8 mm Hg,  consistent with severe pulmonary pressures.      EXAM:  NM PULM VENTILATION PERFUS IMG                          PROCEDURE DATE:  04/29/2018      FINDINGS: There is a matched ventilation/perfusion defect involving the   inferior lingula without corresponding opacity on chest x-ray. There is   heterogeneous tracer distribution in the remainder of the lungs.    IMPRESSION: Abnormal ventilation/perfusion lung scan.    Low probability of pulmonary embolus.        EXAM:  XR CHEST PORTABLE URGENT 1V                            PROCEDURE DATE:  04/29/2018        INTERPRETATION:  EXAMINATION: XR CHEST PORTABLE URGENT 1V    CLINICAL INDICATION: Hypoxia    TECHNIQUE: Single portable view of the chest was obtained.    COMPARISON: 4/23/2018.    IMPRESSION:    The cardiac silhouette is enlarged, similar to the prior study. There is   mild pulmonary vascular congestion and small bilateral pleural effusions.   There are bibasilar opacities, which likely represent compressive   atelectasis; superimposed pneumonia cannot be definitively excluded. CHIEF COMPLAINT:  Bradycardia in the 30s and WCT on telemetry    HISTORY OF PRESENT ILLNESS:  This is an 86 y/o  M with PMH of Chronic AF on Coumadin, HTN, severe pulmonary HTN, Diastolic HF, moderate AS on last echo p/w atypical chest pain and shortness of breath, hypoxic respiratory failure, acute on chronic HFpEF, and worsening renal function. EPS consult called for bradycardia in the 30s on Metoprolol and WCT.      Allergies:    No Known Allergies      MEDICATIONS:  furosemide    Tablet 80 milliGRAM(s) Oral two times a day  hydrALAZINE 25 milliGRAM(s) Oral every 12 hours  docusate sodium 100 milliGRAM(s) Oral three times a day  senna 2 Tablet(s) Oral at bedtime  finasteride 5 milliGRAM(s) Oral daily  sodium chloride 0.65% Nasal 1 Spray(s) Both Nostrils every 2 hours PRN      PAST MEDICAL & SURGICAL HISTORY:  Congestive heart failure  HTN (hypertension)  Atrial fibrillation  No significant past surgical history      FAMILY HISTORY:  No pertinent family history in first degree relatives      SOCIAL HISTORY:    [ ] Non-smoker  [ ] Smoker  [ ] Alcohol      REVIEW OF SYSTEMS:  Constitutional: in NAD  Respiratory: no sob at this time  Neuro: no dizziness or lightheadedness  Cardiac: no chest pain or palpitations  GI: no N/V/abdominal pain  : no dysuria or hematuria  Ext: c/o B/L swelling that is improving    PHYSICAL EXAM:  T(C): 36.4 (05-02-18 @ 13:08), Max: 36.6 (05-01-18 @ 22:30)  HR: 66 (05-02-18 @ 13:08) (55 - 79)  BP: 148/65 (05-02-18 @ 13:08) (122/68 - 149/73)  RR: 18 (05-02-18 @ 13:08) (18 - 18)  SpO2: 92% (05-02-18 @ 13:08) (92% - 94%)  Wt(kg): --  I&O's Summary    01 May 2018 07:01  -  02 May 2018 07:00  --------------------------------------------------------  IN: 960 mL / OUT: 0 mL / NET: 960 mL    02 May 2018 07:01  -  02 May 2018 15:13  --------------------------------------------------------  IN: 360 mL / OUT: 0 mL / NET: 360 mL        Appearance: Normal	  HEENT:  Normocephalic, atraumatic, moist oral mucosa	  Lymphatic: No lymphadenopathy  Cardiovascular: irregular S1 S2, No JVD, No rubs  Respiratory: Lungs clear to auscultation	  Psychiatry: A & O x 3, Mood & affect appropriate  Gastrointestinal:  Soft, Non-tender, + BS	  Skin: No rashes, No ecchymoses, No cyanosis	  Neurologic: Non-focal  Extremities: Normal range of motion, No clubbing, cyanosis; 2+ LE edema  Vascular: Peripheral pulses palpable 2+ bilaterally        LABS:	 	    CBC Full  -  ( 02 May 2018 06:26 )  WBC Count : 7.4 K/uL  Hemoglobin : 9.7 g/dL  Hematocrit : 28.3 %  Platelet Count - Automated : 253 K/uL  Mean Cell Volume : 88.3 fl  Mean Cell Hemoglobin : 30.2 pg  Mean Cell Hemoglobin Concentration : 34.2 gm/dL  Auto Neutrophil # : x  Auto Lymphocyte # : x  Auto Monocyte # : x  Auto Eosinophil # : x  Auto Basophil # : x  Auto Neutrophil % : x  Auto Lymphocyte % : x  Auto Monocyte % : x  Auto Eosinophil % : x  Auto Basophil % : x    05-02    141  |  103  |  105<H>  ----------------------------<  105<H>  4.2   |  25  |  2.98<H>  05-01    141  |  102  |  100<H>  ----------------------------<  109<H>  4.2   |  27  |  3.13<H>    Ca    7.8<L>      02 May 2018 06:25  Ca    7.8<L>      01 May 2018 05:53  Phos  4.1     05-02  Phos  4.5     05-01  Mg     2.0     05-02  Mg     2.0     05-01    Study Date: 4/26/2018    PROCEDURE: Transthoracic echocardiogram with 2-D, M-Mode  and complete spectral and color flow Doppler.  INDICATION: Shortness of breath (R06.02)  ------------------------------------------------------------------------  Dimensions:    Normal Values:  LA:     5.3    2.0 - 4.0 cm  Ao:     4.0    2.0 - 3.8 cm  SEPTUM: 1.3    0.6 - 1.2 cm  PWT:    1.4    0.6 - 1.1 cm  LVIDd:  5.7    3.0 - 5.6 cm  LVIDs:  4.2    1.8 - 4.0 cm  Derived variables:  LVMI: 174 g/m2  RWT: 0.49  EF (Visual Estimate): 75-80 %  Doppler Peak Velocity (m/sec): AoV=3.2    ------------------------------------------------------------------------  Observations:  Mitral Valve: Mitral annular calcification. Mild mitral  regurgitation.  Aortic Valve/Aorta: Calcified trileaflet aortic valve with  decreased opening. Peak transaortic valve gradient equals  41 mm Hg, mean transaortic valve gradient equals 22 mm Hg,  estimated aortic valve area equals 1.4 sqcm (by continuity  equation), aortic valve velocity time integral equals 62  cm, consistent with moderate aortic stenosis. Minimal  aortic regurgitation.  Mean gradient is equal to 2 mm Hg,  LVOT velocity time integral equals 21 cm.  Mild aortic root dilatation  (Ao: 4.0 cm at the sinuses of  Valsalva). LVOT diameter: 2.3 cm.  Left Atrium: Severely dilated left atrium.  LA volume index  = 78 cc/m2.  Left Ventricle: Hyperdynamic left ventricular systolic  function. Concentric left ventricular hypertrophy.  Right Heart: Severe right atrial enlargement. Right  ventricular enlargement with decreased right ventricular  systolic function. Normal tricuspid valve. Mild-moderate  tricuspid regurgitation. Pulmonic valve not well  visualized, probably normal. Minimal pulmonic  regurgitation.  Pericardium/Pleura: No pericardial effusion seen.  Hemodynamic: Estimated right atrial pressure is 8 mm Hg.  Estimated right ventricular systolic pressure equals 63 mm  Hg, assuming right atrial pressure equals 8 mm Hg,  consistent with severe pulmonary hypertension.  ------------------------------------------------------------------------    Conclusions:  1. Calcified trileaflet aortic valve with decreased  opening. Peak transaortic valve gradient equals 41 mm Hg,  mean transaortic valve gradient equals 22 mm Hg, estimated  aortic valve area equals 1.4 sqcm (by continuity equation),  aortic valve velocity time integral equals 62 cm,  consistentwith moderate aortic stenosis. Minimal aortic  regurgitation.  2. Concentric left ventricular hypertrophy.  3. Hyperdynamic left ventricular systolic function.  4. Right ventricular enlargement with decreased right  ventricular systolic function.  5.Estimated pulmonary artery systolic pressure equals 63  mm Hg, assuming right atrial pressure equals 8 mm Hg,  consistent with severe pulmonary pressures.      EXAM:  NM PULM VENTILATION PERFUS IMG                          PROCEDURE DATE:  04/29/2018      FINDINGS: There is a matched ventilation/perfusion defect involving the   inferior lingula without corresponding opacity on chest x-ray. There is   heterogeneous tracer distribution in the remainder of the lungs.    IMPRESSION: Abnormal ventilation/perfusion lung scan.    Low probability of pulmonary embolus.        EXAM:  XR CHEST PORTABLE URGENT 1V                            PROCEDURE DATE:  04/29/2018        INTERPRETATION:  EXAMINATION: XR CHEST PORTABLE URGENT 1V    CLINICAL INDICATION: Hypoxia    TECHNIQUE: Single portable view of the chest was obtained.    COMPARISON: 4/23/2018.    IMPRESSION:    The cardiac silhouette is enlarged, similar to the prior study. There is   mild pulmonary vascular congestion and small bilateral pleural effusions.   There are bibasilar opacities, which likely represent compressive   atelectasis; superimposed pneumonia cannot be definitively excluded. CHIEF COMPLAINT:  Bradycardia in the 30s and WCT on telemetry    HISTORY OF PRESENT ILLNESS:  This is an 88 y/o  M with PMH of Persistent AF on Coumadin, HTN, severe pulmonary HTN, Diastolic HF, moderate AS on last echo p/w atypical chest pain and shortness of breath, hypoxic respiratory failure, acute on chronic HFpEF, and worsening renal function. EPS consult called for bradycardia in the 30s on Metoprolol and WCT.      Allergies:    No Known Allergies      MEDICATIONS:  furosemide    Tablet 80 milliGRAM(s) Oral two times a day  hydrALAZINE 25 milliGRAM(s) Oral every 12 hours  docusate sodium 100 milliGRAM(s) Oral three times a day  senna 2 Tablet(s) Oral at bedtime  finasteride 5 milliGRAM(s) Oral daily  sodium chloride 0.65% Nasal 1 Spray(s) Both Nostrils every 2 hours PRN      PAST MEDICAL & SURGICAL HISTORY:  Congestive heart failure  HTN (hypertension)  Atrial fibrillation  No significant past surgical history      FAMILY HISTORY:  No pertinent family history in first degree relatives      SOCIAL HISTORY:    [ ] Non-smoker  [ ] Smoker  [ ] Alcohol      REVIEW OF SYSTEMS:  Constitutional: in NAD  Respiratory: no sob at this time  Neuro: no dizziness or lightheadedness  Cardiac: no chest pain or palpitations  GI: no N/V/abdominal pain  : no dysuria or hematuria  Ext: c/o B/L swelling that is improving    PHYSICAL EXAM:  T(C): 36.4 (05-02-18 @ 13:08), Max: 36.6 (05-01-18 @ 22:30)  HR: 66 (05-02-18 @ 13:08) (55 - 79)  BP: 148/65 (05-02-18 @ 13:08) (122/68 - 149/73)  RR: 18 (05-02-18 @ 13:08) (18 - 18)  SpO2: 92% (05-02-18 @ 13:08) (92% - 94%)  Wt(kg): --  I&O's Summary    01 May 2018 07:01  -  02 May 2018 07:00  --------------------------------------------------------  IN: 960 mL / OUT: 0 mL / NET: 960 mL    02 May 2018 07:01  -  02 May 2018 15:13  --------------------------------------------------------  IN: 360 mL / OUT: 0 mL / NET: 360 mL        Appearance: Normal	  HEENT:  Normocephalic, atraumatic, moist oral mucosa	  Lymphatic: No lymphadenopathy  Cardiovascular: irregular S1 S2, No JVD, No rubs  Respiratory: Lungs clear to auscultation	  Psychiatry: A & O x 3, Mood & affect appropriate  Gastrointestinal:  Soft, Non-tender, + BS	  Skin: No rashes, No ecchymoses, No cyanosis	  Neurologic: Non-focal  Extremities: Normal range of motion, No clubbing, cyanosis; 2+ LE edema  Vascular: Peripheral pulses palpable 2+ bilaterally        LABS:	 	    CBC Full  -  ( 02 May 2018 06:26 )  WBC Count : 7.4 K/uL  Hemoglobin : 9.7 g/dL  Hematocrit : 28.3 %  Platelet Count - Automated : 253 K/uL  Mean Cell Volume : 88.3 fl  Mean Cell Hemoglobin : 30.2 pg  Mean Cell Hemoglobin Concentration : 34.2 gm/dL  Auto Neutrophil # : x  Auto Lymphocyte # : x  Auto Monocyte # : x  Auto Eosinophil # : x  Auto Basophil # : x  Auto Neutrophil % : x  Auto Lymphocyte % : x  Auto Monocyte % : x  Auto Eosinophil % : x  Auto Basophil % : x    05-02    141  |  103  |  105<H>  ----------------------------<  105<H>  4.2   |  25  |  2.98<H>  05-01    141  |  102  |  100<H>  ----------------------------<  109<H>  4.2   |  27  |  3.13<H>    Ca    7.8<L>      02 May 2018 06:25  Ca    7.8<L>      01 May 2018 05:53  Phos  4.1     05-02  Phos  4.5     05-01  Mg     2.0     05-02  Mg     2.0     05-01    Study Date: 4/26/2018    PROCEDURE: Transthoracic echocardiogram with 2-D, M-Mode  and complete spectral and color flow Doppler.  INDICATION: Shortness of breath (R06.02)  ------------------------------------------------------------------------  Dimensions:    Normal Values:  LA:     5.3    2.0 - 4.0 cm  Ao:     4.0    2.0 - 3.8 cm  SEPTUM: 1.3    0.6 - 1.2 cm  PWT:    1.4    0.6 - 1.1 cm  LVIDd:  5.7    3.0 - 5.6 cm  LVIDs:  4.2    1.8 - 4.0 cm  Derived variables:  LVMI: 174 g/m2  RWT: 0.49  EF (Visual Estimate): 75-80 %  Doppler Peak Velocity (m/sec): AoV=3.2    ------------------------------------------------------------------------  Observations:  Mitral Valve: Mitral annular calcification. Mild mitral  regurgitation.  Aortic Valve/Aorta: Calcified trileaflet aortic valve with  decreased opening. Peak transaortic valve gradient equals  41 mm Hg, mean transaortic valve gradient equals 22 mm Hg,  estimated aortic valve area equals 1.4 sqcm (by continuity  equation), aortic valve velocity time integral equals 62  cm, consistent with moderate aortic stenosis. Minimal  aortic regurgitation.  Mean gradient is equal to 2 mm Hg,  LVOT velocity time integral equals 21 cm.  Mild aortic root dilatation  (Ao: 4.0 cm at the sinuses of  Valsalva). LVOT diameter: 2.3 cm.  Left Atrium: Severely dilated left atrium.  LA volume index  = 78 cc/m2.  Left Ventricle: Hyperdynamic left ventricular systolic  function. Concentric left ventricular hypertrophy.  Right Heart: Severe right atrial enlargement. Right  ventricular enlargement with decreased right ventricular  systolic function. Normal tricuspid valve. Mild-moderate  tricuspid regurgitation. Pulmonic valve not well  visualized, probably normal. Minimal pulmonic  regurgitation.  Pericardium/Pleura: No pericardial effusion seen.  Hemodynamic: Estimated right atrial pressure is 8 mm Hg.  Estimated right ventricular systolic pressure equals 63 mm  Hg, assuming right atrial pressure equals 8 mm Hg,  consistent with severe pulmonary hypertension.  ------------------------------------------------------------------------    Conclusions:  1. Calcified trileaflet aortic valve with decreased  opening. Peak transaortic valve gradient equals 41 mm Hg,  mean transaortic valve gradient equals 22 mm Hg, estimated  aortic valve area equals 1.4 sqcm (by continuity equation),  aortic valve velocity time integral equals 62 cm,  consistentwith moderate aortic stenosis. Minimal aortic  regurgitation.  2. Concentric left ventricular hypertrophy.  3. Hyperdynamic left ventricular systolic function.  4. Right ventricular enlargement with decreased right  ventricular systolic function.  5.Estimated pulmonary artery systolic pressure equals 63  mm Hg, assuming right atrial pressure equals 8 mm Hg,  consistent with severe pulmonary pressures.      EXAM:  NM PULM VENTILATION PERFUS IMG                          PROCEDURE DATE:  04/29/2018      FINDINGS: There is a matched ventilation/perfusion defect involving the   inferior lingula without corresponding opacity on chest x-ray. There is   heterogeneous tracer distribution in the remainder of the lungs.    IMPRESSION: Abnormal ventilation/perfusion lung scan.    Low probability of pulmonary embolus.        EXAM:  XR CHEST PORTABLE URGENT 1V                            PROCEDURE DATE:  04/29/2018        INTERPRETATION:  EXAMINATION: XR CHEST PORTABLE URGENT 1V    CLINICAL INDICATION: Hypoxia    TECHNIQUE: Single portable view of the chest was obtained.    COMPARISON: 4/23/2018.    IMPRESSION:    The cardiac silhouette is enlarged, similar to the prior study. There is   mild pulmonary vascular congestion and small bilateral pleural effusions.   There are bibasilar opacities, which likely represent compressive   atelectasis; superimposed pneumonia cannot be definitively excluded.

## 2018-05-02 NOTE — CONSULT NOTE ADULT - ASSESSMENT
This is an 86 y/o M with hx of AF on Coumadin, moderate AS on last echo p/w atypical chest pain and shortness of breath likely acute on chronic HFpEF. This is an 86 y/o M with hx of Chronic AF on Coumadin, moderate AS on last echo p/w atypical chest pain and shortness of breath likely acute on chronic HFpEF. This is an 86 y/o M with hx of Persistent AF on Coumadin, HTN, Diastolic HF, moderate AS on last echo p/w atypical chest pain and shortness of breath likely acute on chronic HFpEF. Patient with episode of WCT and Afib with slow ventricular response in the 30's on Metoprolol now being held.

## 2018-05-02 NOTE — PROGRESS NOTE ADULT - PROBLEM SELECTOR PLAN 8
Initially he was in Hypertensive crisis during admission.   BP has been better on Hydralazine and diuresis with Lasix.

## 2018-05-02 NOTE — PROGRESS NOTE ADULT - PROBLEM SELECTOR PLAN 3
Cr is around 3 now (baseline 2.8). Renal US showed right renal atrophy. Has been having proteinuria. T2DM controlled with HbA1C 5.5%  - Spoke to Dr. Hema Bob. c/w Lasix 80mg bid and Hydralazine  -  plan for renal biopsy at this age. Outpatient f/u with Renal. Daughter aware  - Avoid nephrotoxins, f/u renal recs  - Immunofixation serum pending. Free light chains are elevated. Will call heme consult

## 2018-05-02 NOTE — PROGRESS NOTE ADULT - PROBLEM SELECTOR PLAN 2
Had rosa down to 31 this am. Had 12 beats WCT last night, otherwise a. fib with controlled HR  - Spoke to Cardiologist- Dr Abraham Haney, d/c'd Metoprolol  - He called EP consult from Dr Oz Perea  - c/w Tele  ** spoke to Daughter- Leslie [phone] about the plan

## 2018-05-02 NOTE — CONSULT NOTE ADULT - PROBLEM SELECTOR RECOMMENDATION 9
Continue anticoagulation with Coumadin, INR currently therapeutic  AV Tres blocker on hold INR currently therapeutic at 2.98, on Coumadin  AV Tres blocker on hold due to bradycardia  Patient with episode of tachycardia in the 150s in the setting of Diastolic HF and need for betablocker therapy  Recommend permanent pacemaker but patient and wife would like to think about it  Recommendation d/w Dr Abraham Parks and will d/w Dr Berman (outside cardiologist)

## 2018-05-02 NOTE — PROGRESS NOTE ADULT - SUBJECTIVE AND OBJECTIVE BOX
Progress Note:   · Provider Specialty	Cardiology	      · Subjective and Objective: 	  Cardiology Follow Up  ==========================================  CC: SOB, AF    Interval Events  - Ervin to the 30s  - EP discussions re: MICRA with outside cardiologist    Review Of Systems:  REVIEW OF SYSTEMS:  Constitutional: No Fever, Fatigue, Weight Changes  Eyes: No Recent Vision Changes, Eye Pain  ENT: No Congestion, Ear Pain, Sore Throat  Endocrine: No Excess Sweating, Temperature Intolerance  Cardiovascular: No Chest Pain, Palpitations, Shortness of Breath, Pre-syncope, Syncope, LE Edema  Respiratory: No Cough, Congestion, Wheezing  Gastrointestinal: No Abdominal Pain, Nausea, Vomiting  Genitourinary: No dysuria, hematuria  Musculoskeletal: No Joint Pain, Swelling  Neurologic: No headaches, Imbalance, Focal Deficits  Skin: No rashes, hematoma, purprura      Medications:    docusate sodium   100 milliGRAM(s) Oral (04-30-18 @ 05:25)   100 milliGRAM(s) Oral (04-29-18 @ 21:09)    finasteride   5 milliGRAM(s) Oral (04-30-18 @ 10:14)    furosemide    Tablet   80 milliGRAM(s) Oral (04-30-18 @ 17:23)   80 milliGRAM(s) Oral (04-30-18 @ 05:25)    hydrALAZINE   25 milliGRAM(s) Oral (04-30-18 @ 17:24)   25 milliGRAM(s) Oral (04-30-18 @ 05:25)    metoprolol tartrate   25 milliGRAM(s) Oral (04-30-18 @ 17:22)    senna   2 Tablet(s) Oral (04-29-18 @ 21:10)    warfarin   2.5 milliGRAM(s) Oral (04-29-18 @ 21:10)            Vital Signs Last 24 Hrs  T(C): 36.7 (30 Apr 2018 13:06), Max: 36.7 (30 Apr 2018 04:00)  T(F): 98.1 (30 Apr 2018 13:06), Max: 98.1 (30 Apr 2018 13:06)  HR: 63 (30 Apr 2018 17:17) (56 - 85)  BP: 133/72 (30 Apr 2018 17:17) (108/54 - 137/73)  BP(mean): --  RR: 20 (30 Apr 2018 19:00) (18 - 20)  SpO2: 85% (30 Apr 2018 19:00) (85% - 99%)  I&O's Summary    29 Apr 2018 07:01  -  30 Apr 2018 07:00  --------------------------------------------------------  IN: 900 mL / OUT: 1000 mL / NET: -100 mL    30 Apr 2018 07:01  -  30 Apr 2018 19:19  --------------------------------------------------------  IN: 360 mL / OUT: 650 mL / NET: -290 mL        Physical Exam:  General: [x ] NAD AOX3  HEENT: [x ] EOMI [ x] PERRL  Cor: [x ] S1,S2 [x ] RRR [x ] No M/R/G   Lungs: [x ] CTA B/L [ x] Normal effort  Abd: [x ] Soft [x ] Non-tender [x ] +BS  Ext: [ x] No edema [x ] No cyanosis  Neuro: Grossly Intact CNII-XII; No focal motor deficits  Abd: Grossly benign non-tender    Labs:                        10.1   7.9   )-----------( 241      ( 30 Apr 2018 06:14 )             29.9     04-30    139  |  103  |  96<H>  ----------------------------<  101<H>  3.9   |  25  |  3.16<H>    Ca    8.0<L>      30 Apr 2018 06:13  Phos  4.9     04-30  Mg     2.0     04-30        Assessment and Plan:   · Assessment		  87 M with AF on A/C, mod AS on last echo presents with chest pain atypical and shortness of breath likely acute on chronic HFpEF  ·	D/C BB  ·	Tachybrady - ongoing discussions re: Micra  ·	Continue current diuretic  ·	Not LHC candidate  ·

## 2018-05-02 NOTE — PROGRESS NOTE ADULT - PROBLEM SELECTOR PLAN 1
Breathing improving on diuretic.  O2 sat 96% in Rest. O2 sat on ambulation is 91% off oxygen. reviewed labs, imaging TTE: Severe Pulmonary HTN, moderate AS with EF 75-80%.  - Nephrology and cardiology recommendation noted  - spoke to card- Dr Abraham Haney. d/c'd Metoprolol for bradycardia  - C/W Lasix 80mg PO q12hrs today, Hydralazine 25mg BID, metoprolol 25mg bid  - daily I/O and weight

## 2018-05-02 NOTE — PROGRESS NOTE ADULT - ASSESSMENT
87 y.o male w/ pmhx a.fib, diastolic CHF (last echo noted 4/2015: LV 60%, severe pulm. htn, mod. AS), HTN presenting w/ c/o of CP x 1 day with sob/chf as well as htn urgency    1- CKD IV suspected and progressive in nature given hx of baseline cr 2.56 in 2016 as documented  2- HTN   3- CHF  4- nephrotic syndrome     serologies are neg for  c-anca/p-anca MPO and anti-proteinase 3 assay despite lung granulomas  hydralazine 25mg bid cont.   will need to change to arb soon given proteinuria   suspect ckd IV/proteinuria due to atrophic kidney and parenchymal loss   lasix 80 mg po bid   cr stable at this level. bun rising but just changed to po lasix from iv monitor bun  keep O> I   fluid status improving  daily weights

## 2018-05-02 NOTE — PROGRESS NOTE ADULT - PROBLEM SELECTOR PLAN 5
As above  - d/c'd Metoprolol for bradycardia. EP consult called  - c/w Coumadin. If plan for PPM, will not give coumadin tonight

## 2018-05-02 NOTE — PROGRESS NOTE ADULT - SUBJECTIVE AND OBJECTIVE BOX
Patient is a 87y old  Male who presents with a chief complaint of Chest pain, SOB.    SUBJECTIVE / OVERNIGHT EVENTS:  Lying in bed, no complaints, afebrile, no c/o urination. Tele- a.silvia, 60-90s. Had 12 beats WCT last night and rosa down to 31 this am    MEDICATIONS  (STANDING):  docusate sodium 100 milliGRAM(s) Oral three times a day  finasteride 5 milliGRAM(s) Oral daily  furosemide    Tablet 80 milliGRAM(s) Oral two times a day  hydrALAZINE 25 milliGRAM(s) Oral every 12 hours  senna 2 Tablet(s) Oral at bedtime    MEDICATIONS  (PRN):  sodium chloride 0.65% Nasal 1 Spray(s) Both Nostrils every 2 hours PRN nasal dryness      Vital Signs Last 24 Hrs  T(C): 36.3 (02 May 2018 04:00), Max: 36.6 (01 May 2018 22:30)  T(F): 97.4 (02 May 2018 04:00), Max: 97.8 (01 May 2018 22:30)  HR: 79 (02 May 2018 04:00) (55 - 79)  BP: 122/68 (02 May 2018 04:00) (122/68 - 149/73)  BP(mean): --  RR: 18 (02 May 2018 04:00) (18 - 18)  SpO2: 92% (02 May 2018 10:53) (92% - 94%)  CAPILLARY BLOOD GLUCOSE        I&O's Summary    01 May 2018 07:01  -  02 May 2018 07:00  --------------------------------------------------------  IN: 960 mL / OUT: 0 mL / NET: 960 mL        PHYSICAL EXAM:-  GENERAL: NAD, well-developed  EYES: EOMI, PERRLA, conjunctiva and sclera clear  NECK: Supple, No JVD, no thyromegaly  CHEST/LUNG: Clear to auscultation bilaterally; No wheeze  HEART: Regular rate and rhythm; S1, S2 audible, aortic systolic murmur.  ABDOMEN: Soft, Nontender, Nondistended; Bowel sounds present  EXTREMITIES:  2+ Peripheral Pulses, No clubbing, cyanosis, or 2+ LE edema  NEURO: AAOx3, no focal deficit      LABS:                        9.7    7.4   )-----------( 253      ( 02 May 2018 06:26 )             28.3     05-02    141  |  103  |  105<H>  ----------------------------<  105<H>  4.2   |  25  |  2.98<H>    Ca    7.8<L>      02 May 2018 06:25  Phos  4.1     05-02  Mg     2.0     05-02    PT/INR - ( 02 May 2018 06:26 )   PT: 25.4 sec;   INR: 2.31 ratio       RADIOLOGY & ADDITIONAL TESTS:    Imaging Personally Reviewed: CXR, Echo  Consultant(s) Notes Reviewed:  card, Renal  Care Discussed with Consultants/Other Providers: card

## 2018-05-03 DIAGNOSIS — I49.5 SICK SINUS SYNDROME: ICD-10-CM

## 2018-05-03 DIAGNOSIS — D64.9 ANEMIA, UNSPECIFIED: ICD-10-CM

## 2018-05-03 LAB
ANION GAP SERPL CALC-SCNC: 14 MMOL/L — SIGNIFICANT CHANGE UP (ref 5–17)
BUN SERPL-MCNC: 94 MG/DL — HIGH (ref 7–23)
CALCIUM SERPL-MCNC: 8.3 MG/DL — LOW (ref 8.4–10.5)
CHLORIDE SERPL-SCNC: 102 MMOL/L — SIGNIFICANT CHANGE UP (ref 96–108)
CO2 SERPL-SCNC: 25 MMOL/L — SIGNIFICANT CHANGE UP (ref 22–31)
CREAT SERPL-MCNC: 2.89 MG/DL — HIGH (ref 0.5–1.3)
FERRITIN SERPL-MCNC: 276 NG/ML — SIGNIFICANT CHANGE UP (ref 30–400)
FOLATE SERPL-MCNC: 6.8 NG/ML — SIGNIFICANT CHANGE UP
GLUCOSE SERPL-MCNC: 112 MG/DL — HIGH (ref 70–99)
HAPTOGLOB SERPL-MCNC: 99 MG/DL — SIGNIFICANT CHANGE UP (ref 34–200)
HCT VFR BLD CALC: 28.9 % — LOW (ref 39–50)
HGB BLD-MCNC: 9.9 G/DL — LOW (ref 13–17)
INR BLD: 2.03 RATIO — HIGH (ref 0.88–1.16)
IRON SATN MFR SERPL: 26 % — SIGNIFICANT CHANGE UP (ref 16–55)
IRON SATN MFR SERPL: 46 UG/DL — SIGNIFICANT CHANGE UP (ref 45–165)
LDH SERPL L TO P-CCNC: 483 U/L — HIGH (ref 50–242)
MCHC RBC-ENTMCNC: 30.3 PG — SIGNIFICANT CHANGE UP (ref 27–34)
MCHC RBC-ENTMCNC: 34.4 GM/DL — SIGNIFICANT CHANGE UP (ref 32–36)
MCV RBC AUTO: 88 FL — SIGNIFICANT CHANGE UP (ref 80–100)
NT-PROBNP SERPL-SCNC: HIGH PG/ML (ref 0–300)
PLATELET # BLD AUTO: 282 K/UL — SIGNIFICANT CHANGE UP (ref 150–400)
POTASSIUM SERPL-MCNC: 4 MMOL/L — SIGNIFICANT CHANGE UP (ref 3.5–5.3)
POTASSIUM SERPL-SCNC: 4 MMOL/L — SIGNIFICANT CHANGE UP (ref 3.5–5.3)
PROTHROM AB SERPL-ACNC: 22.4 SEC — HIGH (ref 9.8–12.7)
RBC # BLD: 3.28 M/UL — LOW (ref 4.2–5.8)
RBC # BLD: 3.28 M/UL — LOW (ref 4.2–5.8)
RBC # FLD: 15.6 % — HIGH (ref 10.3–14.5)
RETICS #: 82.3 K/UL — SIGNIFICANT CHANGE UP (ref 25–125)
RETICS/RBC NFR: 2.5 % — SIGNIFICANT CHANGE UP (ref 0.5–2.5)
SODIUM SERPL-SCNC: 141 MMOL/L — SIGNIFICANT CHANGE UP (ref 135–145)
TIBC SERPL-MCNC: 180 UG/DL — LOW (ref 220–430)
UIBC SERPL-MCNC: 134 UG/DL — SIGNIFICANT CHANGE UP (ref 110–370)
VIT B12 SERPL-MCNC: 459 PG/ML — SIGNIFICANT CHANGE UP (ref 232–1245)
WBC # BLD: 7.8 K/UL — SIGNIFICANT CHANGE UP (ref 3.8–10.5)
WBC # FLD AUTO: 7.8 K/UL — SIGNIFICANT CHANGE UP (ref 3.8–10.5)

## 2018-05-03 PROCEDURE — 99233 SBSQ HOSP IP/OBS HIGH 50: CPT

## 2018-05-03 PROCEDURE — 99223 1ST HOSP IP/OBS HIGH 75: CPT | Mod: GC

## 2018-05-03 RX ORDER — WARFARIN SODIUM 2.5 MG/1
1 TABLET ORAL ONCE
Qty: 0 | Refills: 0 | Status: COMPLETED | OUTPATIENT
Start: 2018-05-03 | End: 2018-05-03

## 2018-05-03 RX ADMIN — Medication 100 MILLIGRAM(S): at 05:04

## 2018-05-03 RX ADMIN — FINASTERIDE 5 MILLIGRAM(S): 5 TABLET, FILM COATED ORAL at 09:20

## 2018-05-03 RX ADMIN — Medication 80 MILLIGRAM(S): at 05:04

## 2018-05-03 RX ADMIN — Medication 25 MILLIGRAM(S): at 05:04

## 2018-05-03 RX ADMIN — Medication 80 MILLIGRAM(S): at 17:32

## 2018-05-03 RX ADMIN — Medication 25 MILLIGRAM(S): at 17:32

## 2018-05-03 RX ADMIN — WARFARIN SODIUM 1 MILLIGRAM(S): 2.5 TABLET ORAL at 21:50

## 2018-05-03 NOTE — PROGRESS NOTE ADULT - SUBJECTIVE AND OBJECTIVE BOX
Progress Note:   · Provider Specialty	Cardiology	      · Subjective and Objective: 	  Cardiology Follow Up  ==========================================  CC: SOB, AF    Interval Events  - Ervin to the 30s  - Doing well from a volume standpoint  - EP planning for Micra    Review Of Systems:  REVIEW OF SYSTEMS:  Constitutional: No Fever, Fatigue, Weight Changes  Eyes: No Recent Vision Changes, Eye Pain  ENT: No Congestion, Ear Pain, Sore Throat  Endocrine: No Excess Sweating, Temperature Intolerance  Cardiovascular: No Chest Pain, Palpitations, Shortness of Breath, Pre-syncope, Syncope, LE Edema  Respiratory: No Cough, Congestion, Wheezing  Gastrointestinal: No Abdominal Pain, Nausea, Vomiting  Genitourinary: No dysuria, hematuria  Musculoskeletal: No Joint Pain, Swelling  Neurologic: No headaches, Imbalance, Focal Deficits  Skin: No rashes, hematoma, purprura      Medications:    docusate sodium   100 milliGRAM(s) Oral (04-30-18 @ 05:25)   100 milliGRAM(s) Oral (04-29-18 @ 21:09)    finasteride   5 milliGRAM(s) Oral (04-30-18 @ 10:14)    furosemide    Tablet   80 milliGRAM(s) Oral (04-30-18 @ 17:23)   80 milliGRAM(s) Oral (04-30-18 @ 05:25)    hydrALAZINE   25 milliGRAM(s) Oral (04-30-18 @ 17:24)   25 milliGRAM(s) Oral (04-30-18 @ 05:25)    metoprolol tartrate   25 milliGRAM(s) Oral (04-30-18 @ 17:22)    senna   2 Tablet(s) Oral (04-29-18 @ 21:10)    warfarin   2.5 milliGRAM(s) Oral (04-29-18 @ 21:10)            Vital Signs Last 24 Hrs  T(C): 36.7 (30 Apr 2018 13:06), Max: 36.7 (30 Apr 2018 04:00)  T(F): 98.1 (30 Apr 2018 13:06), Max: 98.1 (30 Apr 2018 13:06)  HR: 63 (30 Apr 2018 17:17) (56 - 85)  BP: 133/72 (30 Apr 2018 17:17) (108/54 - 137/73)  BP(mean): --  RR: 20 (30 Apr 2018 19:00) (18 - 20)  SpO2: 85% (30 Apr 2018 19:00) (85% - 99%)  I&O's Summary    29 Apr 2018 07:01  -  30 Apr 2018 07:00  --------------------------------------------------------  IN: 900 mL / OUT: 1000 mL / NET: -100 mL    30 Apr 2018 07:01  -  30 Apr 2018 19:19  --------------------------------------------------------  IN: 360 mL / OUT: 650 mL / NET: -290 mL        Physical Exam:  General: [x ] NAD AOX3  HEENT: [x ] EOMI [ x] PERRL  Cor: [x ] S1,S2 [x ] RRR [x ] No M/R/G   Lungs: [x ] CTA B/L [ x] Normal effort  Abd: [x ] Soft [x ] Non-tender [x ] +BS  Ext: [ x] No edema [x ] No cyanosis  Neuro: Grossly Intact CNII-XII; No focal motor deficits  Abd: Grossly benign non-tender    Labs:                        10.1   7.9   )-----------( 241      ( 30 Apr 2018 06:14 )             29.9     04-30    139  |  103  |  96<H>  ----------------------------<  101<H>  3.9   |  25  |  3.16<H>    Ca    8.0<L>      30 Apr 2018 06:13  Phos  4.9     04-30  Mg     2.0     04-30        Assessment and Plan:   · Assessment		  87 M with AF on A/C, mod AS on last echo presents with chest pain atypical and shortness of breath likely acute on chronic HFpEF  ·	Plan for PPM; NPO after MN  ·	Continue current diuretic 80 BID - appreciate renal help  ·	Not C candidate      Valentín Parks MD

## 2018-05-03 NOTE — PROGRESS NOTE ADULT - SUBJECTIVE AND OBJECTIVE BOX
[facility]   [phone]  RENAL FOLLOW UP NOTE  --------------------------------------------------------------------------------  Chief Complaint:    24 hour events/subjective:    no worsening sob or cough       PAST HISTORY  --------------------------------------------------------------------------------  No significant changes to PMH, PSH, FHx, SHx, unless otherwise noted    ALLERGIES & MEDICATIONS  --------------------------------------------------------------------------------  Allergies    No Known Allergies    Intolerances      Standing Inpatient Medications  docusate sodium 100 milliGRAM(s) Oral three times a day  finasteride 5 milliGRAM(s) Oral daily  furosemide    Tablet 80 milliGRAM(s) Oral two times a day  hydrALAZINE 25 milliGRAM(s) Oral every 12 hours  senna 2 Tablet(s) Oral at bedtime  warfarin 1 milliGRAM(s) Oral once    PRN Inpatient Medications  sodium chloride 0.65% Nasal 1 Spray(s) Both Nostrils every 2 hours PRN      REVIEW OF SYSTEMS  --------------------------------------------------------------------------------    Gen: denies fatigue, fevers/chills,  CVS: denies chest pain/palpitations  Resp: denies SOB/Cough  GI: Denies N/V/Abd pain  : Denies dysuria/oliguria/hematuria    All other systems were reviewed and are negative, except as noted.    VITALS/PHYSICAL EXAM  --------------------------------------------------------------------------------  T(C): 36.7 (05-03-18 @ 12:36), Max: 36.7 (05-03-18 @ 04:08)  HR: 90 (05-03-18 @ 17:31) (90 - 98)  BP: 150/79 (05-03-18 @ 17:31) (141/72 - 162/79)  RR: 18 (05-03-18 @ 17:31) (17 - 18)  SpO2: 95% (05-03-18 @ 17:31) (91% - 95%)  Wt(kg): --        05-02-18 @ 07:01  -  05-03-18 @ 07:00  --------------------------------------------------------  IN: 880 mL / OUT: 0 mL / NET: 880 mL    05-03-18 @ 07:01  -  05-03-18 @ 20:33  --------------------------------------------------------  IN: 600 mL / OUT: 0 mL / NET: 600 mL      Physical Exam:  	  Gen: alert oriented place person and date   	Pulm: Decreased breath sounds b/l bases. no rales or ronchi or wheezing  	CV: RRR, S1/S2. no rub  	Back: No CVA tenderness; no sacral edema  	Abd: +BS, soft, nontender/nondistended  	: No suprapubic tenderness.               Extremity: No cyanosis, 1-2- edema no clubbing    LABS/STUDIES	  --------------------------------------------------------------------------------              9.9    7.8   >-----------<  282      [05-03-18 @ 06:29]              28.9     141  |  102  |  94  ----------------------------<  112      [05-03-18 @ 06:39]  4.0   |  25  |  2.89        Ca     8.3     [05-03-18 @ 06:39]      Mg     2.0     [05-02-18 @ 06:25]      Phos  4.1     [05-02-18 @ 06:25]      PT/INR: PT 22.4 , INR 2.03       [05-03-18 @ 06:29]          [05-03-18 @ 06:39]    Creatinine Trend:  SCr 2.89 [05-03 @ 06:39]  SCr 2.98 [05-02 @ 06:25]  SCr 3.13 [05-01 @ 05:53]  SCr 3.16 [04-30 @ 06:13]  SCr 3.18 [04-29 @ 06:25]      Urinalysis - [04-24-18 @ 08:24]      Color Yellow / Appearance Clear / SG 1.012 / pH 5.5      Gluc Negative / Ketone Negative  / Bili Negative / Urobili Negative       Blood Negative / Protein 300 / Leuk Est Negative / Nitrite Negative      RBC 0-2 / WBC 0-2 / Hyaline  / Gran  / Sq Epi  / Non Sq Epi  / Bacteria Few      Iron 46, TIBC 180, %sat 26      [05-03-18 @ 11:09]  Ferritin 276      [05-03-18 @ 09:42]  HbA1c 5.5      [04-25-18 @ 07:44]  TSH 2.32      [04-25-18 @ 07:53]  Lipid: chol 135, , HDL 25, LDL 76      [04-25-18 @ 07:55]    PUSHPA: titer Negative, pattern --      [04-25-18 @ 08:03]  C3 Complement 86      [04-25-18 @ 08:03]  C4 Complement 28      [04-25-18 @ 08:03]  Rheumatoid Factor <7      [04-25-18 @ 07:55]  ANCA: cANCA Negative, pANCA Negative, atypical ANCA Negative      [04-26-18 @ 07:45]  MPO-ANCA <5.0, interpretation: Negative      [04-26-18 @ 07:45]  PR3-ANCA <5.0, interpretation: Negative      [04-26-18 @ 07:45]  Free Light Chains: kappa 9.05, lambda 5.76, ratio = 1.57      [04-27 @ 10:13]  SPEP Interpretation: Normal Electrophoresis Pattern      [04-24-18 @ 19:22]  Immunofixation Urine: No Monoclonal Band Identified      [04-26-18 @ 13:36]  UPEP Interpretation: Mild Selective (Glomerular) Proteinuria      [04-26-18 @ 13:36]

## 2018-05-03 NOTE — PROGRESS NOTE ADULT - ASSESSMENT
87 y.o male w/ pmhx a.fib, diastolic CHF (last echo noted 4/2015: LV 60%, severe pulm. htn, mod. AS), HTN presenting w/ c/o of CP x 1 day with sob/chf as well as htn urgency    1- CKD IV suspected and progressive in nature given hx of baseline cr 2.56 in 2016 as documented  2- HTN   3- CHF  4- nephrotic syndrome     serologies are neg for  c-anca/p-anca MPO and anti-proteinase 3 assay despite lung granulomas  hydralazine 25mg bid cont.   will need to change to arb soon given proteinuria   suspect ckd IV/proteinuria due to atrophic kidney and parenchymal loss   lasix 80 mg po bid   cr stable at this level.   keep O> I   fluid status still not improved. daily weights. will likely need additional metolazone dose

## 2018-05-03 NOTE — CONSULT NOTE ADULT - ASSESSMENT
87 y.o male w/ pmhx a.fib, diastolic CHF (last echo noted 4/2015: LV 60%, severe pulm. htn, mod. AS), HTN presenting w/ c/o of CP, admitted for hypoxic respiratory failure and acute on chronic diastolic HF exacerbation most likely in setting of HTN urgency and worsening renal function, ortiz on ckd.

## 2018-05-03 NOTE — PROGRESS NOTE ADULT - SUBJECTIVE AND OBJECTIVE BOX
Patient is a 87y old  Male who presents with a chief complaint of SOB, chest pain.    SUBJECTIVE / OVERNIGHT EVENTS:  Seen, examined around 10:3pm, 2:30pm  Sitting in chair, no SOB, chest pain or palpitations, LE edema decreasing. Tele- SR , bigemini    MEDICATIONS  (STANDING):  docusate sodium 100 milliGRAM(s) Oral three times a day  finasteride 5 milliGRAM(s) Oral daily  furosemide    Tablet 80 milliGRAM(s) Oral two times a day  hydrALAZINE 25 milliGRAM(s) Oral every 12 hours  senna 2 Tablet(s) Oral at bedtime  warfarin 1 milliGRAM(s) Oral once    MEDICATIONS  (PRN):  sodium chloride 0.65% Nasal 1 Spray(s) Both Nostrils every 2 hours PRN nasal dryness      Vital Signs Last 24 Hrs  T(C): 36.7 (03 May 2018 12:36), Max: 36.7 (03 May 2018 04:08)  T(F): 98.1 (03 May 2018 12:36), Max: 98.1 (03 May 2018 12:36)  HR: 98 (03 May 2018 12:36) (90 - 98)  BP: 149/77 (03 May 2018 12:36) (141/72 - 162/79)  BP(mean): --  RR: 17 (03 May 2018 12:36) (17 - 18)  SpO2: 93% (03 May 2018 12:36) (91% - 93%)  CAPILLARY BLOOD GLUCOSE    PHYSICAL EXAM:-  GENERAL: NAD, well-developed, no acute distress  EYES: EOMI, PERRLA, conjunctiva and sclera clear  NECK: Supple, No JVD, no thyromegaly  CHEST/LUNG: Clear to auscultation bilaterally; No wheeze  HEART: Regular rate and rhythm; S1, S2 audible, No murmurs, rubs, or gallops  ABDOMEN: Soft, Nontender, Nondistended; Bowel sounds present  EXTREMITIES:  2+ Peripheral Pulses, No clubbing, cyanosis, or edema  NEURO: AAOx3, no focal deficit      LABS:                        9.9    7.8   )-----------( 282      ( 03 May 2018 06:29 )             28.9     05-03    141  |  102  |  94<H>  ----------------------------<  112<H>  4.0   |  25  |  2.89<H>    Ca    8.3<L>      03 May 2018 06:39  Phos  4.1     05-02  Mg     2.0     05-02    PT/INR - ( 03 May 2018 06:29 )   PT: 22.4 sec;   INR: 2.03 ratio         RADIOLOGY & ADDITIONAL TESTS:    Imaging Personally Reviewed: CXR, Echo  Consultant(s) Notes Reviewed: Renal, Card, EP  Care Discussed with Consultants/Other Providers: card, EP

## 2018-05-03 NOTE — CONSULT NOTE ADULT - ATTENDING COMMENTS
87 M with multiple medical problems with a normocytic anemia.  He has no evidence of hemolysis or bleeding, no iron/B12 or folate deficiency.    Suspect this is likely due to his CKD- stage IV disease.  Recommend epo level.  SPEP no monoclonal spike, normal kappa/lambda ratio- unlikely plasma cell disorder.  His peripheral smear with some dysplastic neutrophils.  He remains on warfarin for afib.   Outpatient follow up, no intervention for his anemia unless his Hg less than 8 g/dL.
seen and examined with NP. I agree with H & P, A & P.  Tachy-rosa.  Beta blockers currently on hold which is of concern in this patient admitted with resp distres/diastolic HF.  Agree with role of PPM.  D/w pt option of MICRA

## 2018-05-03 NOTE — PROGRESS NOTE ADULT - ASSESSMENT
This is an 88 y/o M with hx of Persistent AF on Coumadin, HTN, Diastolic HF, moderate AS on last echo p/w atypical chest pain and shortness of breath likely acute on chronic HFpEF. Patient with episode of WCT 150s and Afib with slow ventricular response in the 30's on Metoprolol now being held.

## 2018-05-03 NOTE — PROGRESS NOTE ADULT - PROBLEM SELECTOR PLAN 3
Cr is around 3 now (baseline 2.8). Renal US showed right renal atrophy. Has been having proteinuria. T2DM controlled with HbA1C 5.5%  - Spoke to Dr. Hema Bob. c/w Lasix 80mg bid and Hydralazine  -  plan for renal biopsy at this age. Outpatient f/u with Renal. Daughter aware  - Avoid nephrotoxins, f/u renal recs  - Immunofixation serum pending. Free light chains are elevated (Kappa, Lambda). Yesterday called heme consult, awaiting for the input

## 2018-05-03 NOTE — CONSULT NOTE ADULT - PROBLEM SELECTOR RECOMMENDATION 9
normocytic normochromic cells, teardrops on smear  will need bone marrow as outpatient to look for MDS  iron studies, B12, folate within normal limits  may have anemia of chronic disease  will not give BRIE tx at this time until outpatient hematology noah at [facility]  [phone] new patient line

## 2018-05-03 NOTE — PROGRESS NOTE ADULT - PROBLEM SELECTOR PLAN 1
No further bradycardia episode overnight but still with frequent PVC/couplet and bigeminy on telemetry  Metoprolol has been on hold due to previous episode of bradycardia, patient will need betablocker for his Diastolic HF and HTN  Hold Coumadin tonight  Plan for Micra PPM implant tomorrow, patient and spouse agreeable  Procedure d/w patient, spouse, and daughter Leslie  Left message with Dr Tobin Leonardo (pt's outside cardiologist)

## 2018-05-03 NOTE — PROGRESS NOTE ADULT - PROBLEM SELECTOR PLAN 2
Breathing improving on diuretic.  O2 sat 96% in Rest. O2 sat on ambulation is 91% off oxygen. reviewed labs, imaging TTE: Severe Pulmonary HTN, moderate AS with EF 75-80%.  - Nephrology and cardiology recommendation noted  - C/W Lasix 80mg PO q12hrs today, Hydralazine 25mg BID  - EP plans for PPM  - daily I/O and weight

## 2018-05-03 NOTE — CONSULT NOTE ADULT - SUBJECTIVE AND OBJECTIVE BOX
HPI:  87 y.o male w/ pmhx a.fib, diastolic CHF (last echo noted 4/2015: LV 60%, severe pulm. htn, mod. AS), HTN presenting w/ c/o of CP x 1 day. Patient states he had sub-sternal sharp CP which started around 6pm on day of admission. CP was intermittent, non-radiating, no numbness/tingling, diaphoresis, palpitations noted.  He states he has never had this happen before. Associated symptoms include SOB x 2 days and decreased urination x 2months. He states he feel SOB when walking a couple feet and especially feels SOB when he is trying to have a conversation. He has also noted decreased urination in the last two months. Denies fevers/chills, cough, abdominal pain, changes in bowel habits, dysuria, incontinence. All other ROS is negative. No recent changes in medications, or drastic changes in diet. No recent sick contacts. Last saw cardiologist ([phone]) as per daughter six months ago, may have had an echo at that time, daughter unsure. Daughter and patient also state that patient used to have type II DM, used to be on insulin, and his sugars have been controlled, and he has been off medications for two years. He also used to follow a nephrologist, does not recall name, last saw nephrologist over two years ago as per patient.     Currently, CP resolved, SOB improved as per patient. All other ROS is negative.    Of note, allscripts as data from 2015, patient's prior cardiologist-Dr. Praveen Henderson  His notes indicate patient has chronic diastolic HF ,severe pulm HTn, mod AS. Had echo 4/2015-normal systolic function, LVEF: 60%  Labs from 1/2016 Cr. 2.56    ED vitals: temp. 97.5, HR 79, /105--->/71, RR 20 (max 26), 100% RA-->79% RA--->100% 4L NC--> now on BIPAP 10/5, Rate 12-->breathing at rate 18-20, FIO2 40%, , taking in -400  In the ED: given ASA 324mg x 1, calcium gluconate x 1 g, 80IVmg lasix x 1 (24 Apr 2018 00:00)      Allergies    No Known Allergies    Intolerances        MEDICATIONS  (STANDING):  docusate sodium 100 milliGRAM(s) Oral three times a day  finasteride 5 milliGRAM(s) Oral daily  furosemide    Tablet 80 milliGRAM(s) Oral two times a day  hydrALAZINE 25 milliGRAM(s) Oral every 12 hours  senna 2 Tablet(s) Oral at bedtime    MEDICATIONS  (PRN):  sodium chloride 0.65% Nasal 1 Spray(s) Both Nostrils every 2 hours PRN nasal dryness      PAST MEDICAL & SURGICAL HISTORY:  Congestive heart failure  HTN (hypertension)  Atrial fibrillation  No significant past surgical history      FAMILY HISTORY:  No pertinent family history in first degree relatives      SOCIAL HISTORY: No EtOH, no tobacco    REVIEW OF SYSTEMS:    CONSTITUTIONAL: No weakness, fevers or chills  EYES/ENT: No visual changes;  No vertigo or throat pain   NECK: No pain or stiffness  RESPIRATORY: No cough, wheezing, hemoptysis; No shortness of breath  CARDIOVASCULAR: No chest pain or palpitations  GASTROINTESTINAL: No abdominal or epigastric pain. No nausea, vomiting, or hematemesis; No diarrhea or constipation. No melena or hematochezia.  GENITOURINARY: No dysuria, frequency or hematuria  NEUROLOGICAL: No numbness or weakness  SKIN: No itching, burning, rashes, or lesions   All other review of systems is negative unless indicated above.        T(F): 98.2 (05-03-18 @ 21:06), Max: 98.2 (05-03-18 @ 21:06)  HR: 71 (05-03-18 @ 21:06)  BP: 159/85 (05-03-18 @ 21:06)  RR: 18 (05-03-18 @ 21:06)  SpO2: 97% (05-03-18 @ 21:06)  Wt(kg): --    GENERAL: NAD, well-developed  HEAD:  Atraumatic, Normocephalic  EYES: EOMI, PERRLA, conjunctiva and sclera clear  NECK: Supple, No JVD  CHEST/LUNG: Clear to auscultation bilaterally; No wheeze  HEART: Regular rate and rhythm; No murmurs, rubs, or gallops  ABDOMEN: Soft, Nontender, Nondistended; Bowel sounds present  EXTREMITIES:  2+ Peripheral Pulses, No clubbing, cyanosis, or edema  NEUROLOGY: non-focal  SKIN: No rashes or lesions                          9.9    7.8   )-----------( 282      ( 03 May 2018 06:29 )             28.9       05-03    141  |  102  |  94<H>  ----------------------------<  112<H>  4.0   |  25  |  2.89<H>    Ca    8.3<L>      03 May 2018 06:39  Phos  4.1     05-02  Mg     2.0     05-02        Lactate Dehydrogenase, Serum: 483 U/L (05-03 @ 06:39)

## 2018-05-03 NOTE — PROGRESS NOTE ADULT - PROBLEM SELECTOR PLAN 1
Yesterday he had bradycardia down to 31, Metoprolol was discontinued.  Tele- SR , [place]  - Spoke to Dr Troncoso, he will do PPM (Micra) tomorrow, NPO past MN. He spoke to Daughter- Leslie and her brother in Law  - coumadin 1mg tonight

## 2018-05-03 NOTE — PROGRESS NOTE ADULT - SUBJECTIVE AND OBJECTIVE BOX
24H hour events:       MEDICATIONS:  furosemide    Tablet 80 milliGRAM(s) Oral two times a day  hydrALAZINE 25 milliGRAM(s) Oral every 12 hours  docusate sodium 100 milliGRAM(s) Oral three times a day  senna 2 Tablet(s) Oral at bedtime  finasteride 5 milliGRAM(s) Oral daily  sodium chloride 0.65% Nasal 1 Spray(s) Both Nostrils every 2 hours PRN      REVIEW OF SYSTEMS:  Complete 10point ROS negative.    PHYSICAL EXAM:  T(C): 36.7 (05-03-18 @ 12:36), Max: 36.7 (05-03-18 @ 04:08)  HR: 98 (05-03-18 @ 12:36) (90 - 98)  BP: 149/77 (05-03-18 @ 12:36) (141/72 - 162/79)  RR: 17 (05-03-18 @ 12:36) (17 - 18)  SpO2: 93% (05-03-18 @ 12:36) (91% - 93%)  Wt(kg): --  I&O's Summary    02 May 2018 07:01  -  03 May 2018 07:00  --------------------------------------------------------  IN: 880 mL / OUT: 0 mL / NET: 880 mL    03 May 2018 07:01  -  03 May 2018 13:15  --------------------------------------------------------  IN: 240 mL / OUT: 0 mL / NET: 240 mL        Appearance: Normal	  HEENT:   Normal oral mucosa, PERRL, EOMI	  Lymphatic: No lymphadenopathy  Cardiovascular: Normal S1 S2, No JVD, No murmurs, No edema  Respiratory: Lungs clear to auscultation	  Psychiatry: A & O x 3, Mood & affect appropriate  Gastrointestinal:  Soft, Non-tender, + BS	  Skin: No rashes, No ecchymoses, No cyanosis	  Neurologic: Non-focal  Extremities: Normal range of motion, No clubbing, cyanosis or edema  Vascular: Peripheral pulses palpable 2+ bilaterally        LABS:	 	    CBC Full  -  ( 03 May 2018 06:29 )  WBC Count : 7.8 K/uL  Hemoglobin : 9.9 g/dL  Hematocrit : 28.9 %  Platelet Count - Automated : 282 K/uL  Mean Cell Volume : 88.0 fl  Mean Cell Hemoglobin : 30.3 pg  Mean Cell Hemoglobin Concentration : 34.4 gm/dL  Auto Neutrophil # : x  Auto Lymphocyte # : x  Auto Monocyte # : x  Auto Eosinophil # : x  Auto Basophil # : x  Auto Neutrophil % : x  Auto Lymphocyte % : x  Auto Monocyte % : x  Auto Eosinophil % : x  Auto Basophil % : x    05-03    141  |  102  |  94<H>  ----------------------------<  112<H>  4.0   |  25  |  2.89<H>  05-02    141  |  103  |  105<H>  ----------------------------<  105<H>  4.2   |  25  |  2.98<H>    Ca    8.3<L>      03 May 2018 06:39  Ca    7.8<L>      02 May 2018 06:25  Phos  4.1     05-02  Mg     2.0     05-02        proBNP: Serum Pro-Brain Natriuretic Peptide: 13252 pg/mL (05-03 @ 06:39) 24H hour events:   Sitting up in chair eating lunch, no c/o presented    MEDICATIONS:  furosemide    Tablet 80 milliGRAM(s) Oral two times a day  hydrALAZINE 25 milliGRAM(s) Oral every 12 hours  docusate sodium 100 milliGRAM(s) Oral three times a day  senna 2 Tablet(s) Oral at bedtime  finasteride 5 milliGRAM(s) Oral daily  sodium chloride 0.65% Nasal 1 Spray(s) Both Nostrils every 2 hours PRN      REVIEW OF SYSTEMS:  Complete 10point ROS negative.    PHYSICAL EXAM:  T(C): 36.7 (05-03-18 @ 12:36), Max: 36.7 (05-03-18 @ 04:08)  HR: 98 (05-03-18 @ 12:36) (90 - 98)  BP: 149/77 (05-03-18 @ 12:36) (141/72 - 162/79)  RR: 17 (05-03-18 @ 12:36) (17 - 18)  SpO2: 93% (05-03-18 @ 12:36) (91% - 93%)  Wt(kg): --  I&O's Summary    02 May 2018 07:01  -  03 May 2018 07:00  --------------------------------------------------------  IN: 880 mL / OUT: 0 mL / NET: 880 mL    03 May 2018 07:01  -  03 May 2018 13:15  --------------------------------------------------------  IN: 240 mL / OUT: 0 mL / NET: 240 mL        Appearance: Normal	  HEENT:  Normocephalic, atraumatic, moist oral mucosa	  Lymphatic: No lymphadenopathy  Cardiovascular: irregular S1 S2, No JVD, No murmurs, No edema  Respiratory: Lungs clear to auscultation	  Psychiatry: A & O x 3, Mood & affect appropriate  Gastrointestinal:  Soft, Non-tender, + BS	  Skin: No rashes, No ecchymoses, No cyanosis	  Neurologic: Non-focal  Extremities: Normal range of motion, No clubbing, cyanosis or edema  Vascular: Peripheral pulses palpable 2+ bilaterally        LABS:	 	    CBC Full  -  ( 03 May 2018 06:29 )  WBC Count : 7.8 K/uL  Hemoglobin : 9.9 g/dL  Hematocrit : 28.9 %  Platelet Count - Automated : 282 K/uL  Mean Cell Volume : 88.0 fl  Mean Cell Hemoglobin : 30.3 pg  Mean Cell Hemoglobin Concentration : 34.4 gm/dL  Auto Neutrophil # : x  Auto Lymphocyte # : x  Auto Monocyte # : x  Auto Eosinophil # : x  Auto Basophil # : x  Auto Neutrophil % : x  Auto Lymphocyte % : x  Auto Monocyte % : x  Auto Eosinophil % : x  Auto Basophil % : x    05-03    141  |  102  |  94<H>  ----------------------------<  112<H>  4.0   |  25  |  2.89<H>  05-02    141  |  103  |  105<H>  ----------------------------<  105<H>  4.2   |  25  |  2.98<H>    Ca    8.3<L>      03 May 2018 06:39  Ca    7.8<L>      02 May 2018 06:25  Phos  4.1     05-02  Mg     2.0     05-02        proBNP: Serum Pro-Brain Natriuretic Peptide: 56342 pg/mL (05-03 @ 06:39)      Telemetry: Atrial Fibrillation, PVC, Bigeminy, Couplet 70-100s

## 2018-05-04 PROBLEM — I50.9 HEART FAILURE, UNSPECIFIED: Chronic | Status: ACTIVE | Noted: 2018-04-23

## 2018-05-04 PROBLEM — I10 ESSENTIAL (PRIMARY) HYPERTENSION: Chronic | Status: ACTIVE | Noted: 2018-04-23

## 2018-05-04 PROBLEM — I48.91 UNSPECIFIED ATRIAL FIBRILLATION: Chronic | Status: ACTIVE | Noted: 2018-04-23

## 2018-05-04 LAB
ANION GAP SERPL CALC-SCNC: 15 MMOL/L — SIGNIFICANT CHANGE UP (ref 5–17)
APTT BLD: 35.1 SEC — SIGNIFICANT CHANGE UP (ref 27.5–37.4)
BLD GP AB SCN SERPL QL: NEGATIVE — SIGNIFICANT CHANGE UP
BUN SERPL-MCNC: 84 MG/DL — HIGH (ref 7–23)
CALCIUM SERPL-MCNC: 8.4 MG/DL — SIGNIFICANT CHANGE UP (ref 8.4–10.5)
CHLORIDE SERPL-SCNC: 104 MMOL/L — SIGNIFICANT CHANGE UP (ref 96–108)
CO2 SERPL-SCNC: 25 MMOL/L — SIGNIFICANT CHANGE UP (ref 22–31)
CREAT SERPL-MCNC: 3 MG/DL — HIGH (ref 0.5–1.3)
GLUCOSE SERPL-MCNC: 112 MG/DL — HIGH (ref 70–99)
HCT VFR BLD CALC: 29.3 % — LOW (ref 39–50)
HGB BLD-MCNC: 10 G/DL — LOW (ref 13–17)
IGA FLD-MCNC: 101 MG/DL — SIGNIFICANT CHANGE UP (ref 68–378)
IGG FLD-MCNC: 554 MG/DL — LOW (ref 694–1618)
IGM SERPL-MCNC: 111 MG/DL — SIGNIFICANT CHANGE UP (ref 40–230)
INR BLD: 1.71 RATIO — HIGH (ref 0.88–1.16)
INTERPRETATION SERPL IFE-IMP: SIGNIFICANT CHANGE UP
MAGNESIUM SERPL-MCNC: 2 MG/DL — SIGNIFICANT CHANGE UP (ref 1.6–2.6)
MCHC RBC-ENTMCNC: 30.3 PG — SIGNIFICANT CHANGE UP (ref 27–34)
MCHC RBC-ENTMCNC: 34.3 GM/DL — SIGNIFICANT CHANGE UP (ref 32–36)
MCV RBC AUTO: 88.5 FL — SIGNIFICANT CHANGE UP (ref 80–100)
PHOSPHATE SERPL-MCNC: 3.4 MG/DL — SIGNIFICANT CHANGE UP (ref 2.5–4.5)
PLATELET # BLD AUTO: 282 K/UL — SIGNIFICANT CHANGE UP (ref 150–400)
POTASSIUM SERPL-MCNC: 4.1 MMOL/L — SIGNIFICANT CHANGE UP (ref 3.5–5.3)
POTASSIUM SERPL-SCNC: 4.1 MMOL/L — SIGNIFICANT CHANGE UP (ref 3.5–5.3)
PROTHROM AB SERPL-ACNC: 18.7 SEC — HIGH (ref 9.8–12.7)
RBC # BLD: 3.31 M/UL — LOW (ref 4.2–5.8)
RBC # FLD: 15.5 % — HIGH (ref 10.3–14.5)
RH IG SCN BLD-IMP: POSITIVE — SIGNIFICANT CHANGE UP
RH IG SCN BLD-IMP: POSITIVE — SIGNIFICANT CHANGE UP
SODIUM SERPL-SCNC: 144 MMOL/L — SIGNIFICANT CHANGE UP (ref 135–145)
WBC # BLD: 7.3 K/UL — SIGNIFICANT CHANGE UP (ref 3.8–10.5)
WBC # FLD AUTO: 7.3 K/UL — SIGNIFICANT CHANGE UP (ref 3.8–10.5)

## 2018-05-04 PROCEDURE — 99232 SBSQ HOSP IP/OBS MODERATE 35: CPT

## 2018-05-04 PROCEDURE — 99233 SBSQ HOSP IP/OBS HIGH 50: CPT

## 2018-05-04 PROCEDURE — 0387T: CPT | Mod: Q0

## 2018-05-04 PROCEDURE — 71045 X-RAY EXAM CHEST 1 VIEW: CPT | Mod: 26

## 2018-05-04 PROCEDURE — 93010 ELECTROCARDIOGRAM REPORT: CPT | Mod: 76

## 2018-05-04 RX ORDER — LOSARTAN POTASSIUM 100 MG/1
25 TABLET, FILM COATED ORAL DAILY
Qty: 0 | Refills: 0 | Status: DISCONTINUED | OUTPATIENT
Start: 2018-05-04 | End: 2018-05-07

## 2018-05-04 RX ORDER — METOPROLOL TARTRATE 50 MG
25 TABLET ORAL
Qty: 0 | Refills: 0 | Status: DISCONTINUED | OUTPATIENT
Start: 2018-05-04 | End: 2018-05-08

## 2018-05-04 RX ORDER — WARFARIN SODIUM 2.5 MG/1
3 TABLET ORAL ONCE
Qty: 0 | Refills: 0 | Status: COMPLETED | OUTPATIENT
Start: 2018-05-04 | End: 2018-05-04

## 2018-05-04 RX ADMIN — Medication 80 MILLIGRAM(S): at 05:31

## 2018-05-04 RX ADMIN — Medication 100 MILLIGRAM(S): at 17:32

## 2018-05-04 RX ADMIN — Medication 100 MILLIGRAM(S): at 21:20

## 2018-05-04 RX ADMIN — Medication 25 MILLIGRAM(S): at 05:30

## 2018-05-04 RX ADMIN — Medication 25 MILLIGRAM(S): at 18:41

## 2018-05-04 RX ADMIN — Medication 80 MILLIGRAM(S): at 17:33

## 2018-05-04 RX ADMIN — SENNA PLUS 2 TABLET(S): 8.6 TABLET ORAL at 21:20

## 2018-05-04 RX ADMIN — WARFARIN SODIUM 3 MILLIGRAM(S): 2.5 TABLET ORAL at 21:20

## 2018-05-04 RX ADMIN — LOSARTAN POTASSIUM 25 MILLIGRAM(S): 100 TABLET, FILM COATED ORAL at 18:40

## 2018-05-04 RX ADMIN — Medication 25 MILLIGRAM(S): at 17:33

## 2018-05-04 RX ADMIN — FINASTERIDE 5 MILLIGRAM(S): 5 TABLET, FILM COATED ORAL at 11:20

## 2018-05-04 NOTE — PROVIDER CONTACT NOTE (OTHER) - DATE AND TIME:
04-May-2018 05:25
24-Apr-2018 02:40
24-Apr-2018 03:55
24-Apr-2018 06:21
25-Apr-2018 15:02
26-Apr-2018 10:30
28-Apr-2018 06:57
28-Apr-2018 09:36
30-Apr-2018 03:55
27-Apr-2018 23:25

## 2018-05-04 NOTE — PROGRESS NOTE ADULT - PROBLEM SELECTOR PLAN 4
The patient has been having proteinuria from before (old record in the chart from 6/13/17). DDx- Glomerular, Tubular, overflow or postrenal. SPEP is abnormal but keppa/Lamda ratio is normal.  - Appreciated Heme consult and plan, outpatient f/u at [facility] [place], might need BM biopsy to r/u MDS  - Losartan resumed per Renal.

## 2018-05-04 NOTE — PROGRESS NOTE ADULT - SUBJECTIVE AND OBJECTIVE BOX
24H hour events: Patient resting comfortably in bed, denies c/o CP, palpitations or SOB.     MEDICATIONS:  furosemide    Tablet 80 milliGRAM(s) Oral two times a day  hydrALAZINE 25 milliGRAM(s) Oral every 12 hours  warfarin 3 milliGRAM(s) Oral once      docusate sodium 100 milliGRAM(s) Oral three times a day  senna 2 Tablet(s) Oral at bedtime    finasteride 5 milliGRAM(s) Oral daily    sodium chloride 0.65% Nasal 1 Spray(s) Both Nostrils every 2 hours PRN      REVIEW OF SYSTEMS:  Complete 10point ROS negative.    PHYSICAL EXAM:  T(C): 37.2 (05-04-18 @ 04:13), Max: 37.2 (05-04-18 @ 04:13)  HR: 94 (05-04-18 @ 05:25) (71 - 98)  BP: 136/68 (05-04-18 @ 05:25) (130/65 - 159/85)  RR: 18 (05-04-18 @ 04:13) (17 - 18)  SpO2: 93% (05-04-18 @ 04:13) (93% - 97%)    03 May 2018 07:01  -  04 May 2018 07:00  --------------------------------------------------------  IN: 600 mL / OUT: 0 mL / NET: 600 mL        Appearance: Normal	  Cardiovascular: Normal S1 S2, irregular. No JVD, No murmurs, No edema  Respiratory: Lungs clear to auscultation	  Psychiatry: A & O x 3, Mood & affect appropriate  Gastrointestinal:  Soft, Non-tender, + BS	  Skin: No rashes, No ecchymoses, No cyanosis	  Neurologic: Non-focal  Extremities: +1 pitting BL/LE edema. Normal range of motion, No clubbing, cyanosis    Vascular: Peripheral pulses palpable 2+ bilaterally      LABS:	 	    CBC Full  -  ( 04 May 2018 06:49 )  WBC Count : 7.3 K/uL  Hemoglobin : 10.0 g/dL  Hematocrit : 29.3 %  Platelet Count - Automated : 282 K/uL  Mean Cell Volume : 88.5 fl  Mean Cell Hemoglobin : 30.3 pg  Mean Cell Hemoglobin Concentration : 34.3 gm/dL  Auto Neutrophil # : x  Auto Lymphocyte # : x  Auto Monocyte # : x  Auto Eosinophil # : x  Auto Basophil # : x  Auto Neutrophil % : x  Auto Lymphocyte % : x  Auto Monocyte % : x  Auto Eosinophil % : x  Auto Basophil % : x    05-04    144  |  104  |  84<H>  ----------------------------<  112<H>  4.1   |  25  |  3.00<H>  05-03    141  |  102  |  94<H>  ----------------------------<  112<H>  4.0   |  25  |  2.89<H>    Ca    8.4      04 May 2018 06:42  Ca    8.3<L>      03 May 2018 06:39  Phos  3.4     05-04  Mg     2.0     05-04        proBNP: Serum Pro-Brain Natriuretic Peptide: 67479 pg/mL (05-03 @ 06:39)      TELEMETRY: 	  AFib 80 -100

## 2018-05-04 NOTE — PROGRESS NOTE ADULT - SUBJECTIVE AND OBJECTIVE BOX
Pre-op Diagnosis: TBS    Post-op Diagnosis:TBS    Procedure: PPM    Electrophysiologist: Karyna    Anesthesia: Dr. Ortega    Access: RFV    Description: Micra from RFV.    Complications: none    EBL: <10 cc    Disposition: recovery    Plan: resume coumadin. Resume metoprolol

## 2018-05-04 NOTE — PROGRESS NOTE ADULT - ASSESSMENT
87 M with AF on A/C, mod AS on last echo presents with chest pain atypical and shortness of breath likely acute on chronic HFpEF  ·	Continue to diurese. Symptoms improving.   ·	Continue present medications.   ·	Plan for MICRA today for tachybrady.   ·	D/w family    =======================================================================  Hermelindo Magana MD [facility]    Please page [phone] with questions  On nights and weekend please call the office [phone].

## 2018-05-04 NOTE — PROGRESS NOTE ADULT - SUBJECTIVE AND OBJECTIVE BOX
Cardiology Follow Up  ==========================================  CC: Tachy rosa, SOB, HFpEF    HPI: No significant cardiac events overnight.     Review Of Systems:  Negative except as documented above    Medications:    furosemide    Tablet   80 milliGRAM(s) Oral (05-04-18 @ 05:31)   80 milliGRAM(s) Oral (05-03-18 @ 17:32)    hydrALAZINE   25 milliGRAM(s) Oral (05-04-18 @ 05:30)   25 milliGRAM(s) Oral (05-03-18 @ 17:32)    warfarin   1 milliGRAM(s) Oral (05-03-18 @ 21:50)        Telemetry:     Vital Signs Last 24 Hrs  T(C): 37.2 (04 May 2018 04:13), Max: 37.2 (04 May 2018 04:13)  T(F): 98.9 (04 May 2018 04:13), Max: 98.9 (04 May 2018 04:13)  HR: 94 (04 May 2018 05:25) (71 - 98)  BP: 136/68 (04 May 2018 05:25) (130/65 - 159/85)  BP(mean): --  RR: 18 (04 May 2018 04:13) (17 - 18)  SpO2: 93% (04 May 2018 04:13) (93% - 97%)  I&O's Summary    03 May 2018 07:01  -  04 May 2018 07:00  --------------------------------------------------------  IN: 600 mL / OUT: 0 mL / NET: 600 mL        Physical Exam:  General: [x ] NAD  HEENT: [x ] EOMI [ x] PERRL  Cor: [x ] S1,S2 [x ] RRR [x ] No M/R/G   Lungs: [x ] CTA B/L [ x] Normal effort  Abd: [x ] Soft [x ] Non-tender [x ] +BS  Ext: [ x] No edema [x ] No cyanosis    Labs:                        10.0   7.3   )-----------( 282      ( 04 May 2018 06:49 )             29.3     05-04    144  |  104  |  84<H>  ----------------------------<  112<H>  4.1   |  25  |  3.00<H>    Ca    8.4      04 May 2018 06:42  Phos  3.4     05-04  Mg     2.0     05-04 Cardiology Follow Up  ==========================================  CC: Tachy rosa, SOB, HFpEF    HPI: No significant cardiac events overnight.     Review Of Systems:  Negative except as documented above    Medications:    furosemide    Tablet   80 milliGRAM(s) Oral (05-04-18 @ 05:31)   80 milliGRAM(s) Oral (05-03-18 @ 17:32)    hydrALAZINE   25 milliGRAM(s) Oral (05-04-18 @ 05:30)   25 milliGRAM(s) Oral (05-03-18 @ 17:32)    warfarin   1 milliGRAM(s) Oral (05-03-18 @ 21:50)        Telemetry:     Vital Signs Last 24 Hrs  T(C): 37.2 (04 May 2018 04:13), Max: 37.2 (04 May 2018 04:13)  T(F): 98.9 (04 May 2018 04:13), Max: 98.9 (04 May 2018 04:13)  HR: 94 (04 May 2018 05:25) (71 - 98)  BP: 136/68 (04 May 2018 05:25) (130/65 - 159/85)  BP(mean): --  RR: 18 (04 May 2018 04:13) (17 - 18)  SpO2: 93% (04 May 2018 04:13) (93% - 97%)  I&O's Summary    03 May 2018 07:01  -  04 May 2018 07:00  --------------------------------------------------------  IN: 600 mL / OUT: 0 mL / NET: 600 mL        Physical Exam:  General: [x ] NAD  HEENT: [x ] EOMI [ x] PERRL  Cor: [x ] S1,S2 [x ] RRR [x ] No M/R/G   Lungs: [x ] CTA B/L [ x] Normal effort  Abd: [x ] Soft [x ] Non-tender [x ] +BS  Ext: [ x] LE edema [x ] No cyanosis    Labs:                        10.0   7.3   )-----------( 282      ( 04 May 2018 06:49 )             29.3     05-04    144  |  104  |  84<H>  ----------------------------<  112<H>  4.1   |  25  |  3.00<H>    Ca    8.4      04 May 2018 06:42  Phos  3.4     05-04  Mg     2.0     05-04

## 2018-05-04 NOTE — PROGRESS NOTE ADULT - SUBJECTIVE AND OBJECTIVE BOX
Patient is a 87y old  Male who presents with a chief complaint of SOB, LE edema.    SUBJECTIVE / OVERNIGHT EVENTS:  Seen, examined around 10am and 2 pm. Spoke to daughter at bedside  Sitting in chair, no chest pain or SOB. afebrile, LE edema improved. Tele- tachy rosa      MEDICATIONS  (STANDING):  docusate sodium 100 milliGRAM(s) Oral three times a day  finasteride 5 milliGRAM(s) Oral daily  furosemide    Tablet 80 milliGRAM(s) Oral two times a day  hydrALAZINE 25 milliGRAM(s) Oral every 12 hours  losartan 25 milliGRAM(s) Oral daily  senna 2 Tablet(s) Oral at bedtime  warfarin 3 milliGRAM(s) Oral once    MEDICATIONS  (PRN):  sodium chloride 0.65% Nasal 1 Spray(s) Both Nostrils every 2 hours PRN nasal dryness      Vital Signs Last 24 Hrs  T(C): 36.7 (04 May 2018 12:26), Max: 37.2 (04 May 2018 04:13)  T(F): 98 (04 May 2018 12:26), Max: 98.9 (04 May 2018 04:13)  HR: 92 (04 May 2018 12:26) (71 - 94)  BP: 147/78 (04 May 2018 12:26) (130/65 - 159/85)  BP(mean): --  RR: 18 (04 May 2018 12:26) (18 - 18)  SpO2: 97% (04 May 2018 12:26) (93% - 97%)    PHYSICAL EXAM:-  GENERAL: NAD, well-developed  EYES: EOMI, PERRLA, conjunctiva and sclera clear  NECK: Supple, No JVD, no thyromegaly  CHEST/LUNG: Clear to auscultation bilaterally; No wheeze  HEART: irregular rate and rhythm; S1, S2 audible, No murmurs, rubs, or gallops  ABDOMEN: Soft, Nontender, Nondistended; Bowel sounds present  EXTREMITIES:  2+ Peripheral Pulses, No clubbing, cyanosis, or 2+ edema  NEURO: AAOx3, no focal deficit      LABS:                        10.0   7.3   )-----------( 282      ( 04 May 2018 06:49 )             29.3     05-04    144  |  104  |  84<H>  ----------------------------<  112<H>  4.1   |  25  |  3.00<H>    Ca    8.4      04 May 2018 06:42  Phos  3.4     05-04  Mg     2.0     05-04      PT/INR - ( 04 May 2018 06:43 )   PT: 18.7 sec;   INR: 1.71 ratio       PTT - ( 04 May 2018 06:43 )  PTT:35.1 sec      RADIOLOGY & ADDITIONAL TESTS:    Imaging Personally Reviewed: CXR, Echo, doppler LE  Consultant(s) Notes Reviewed:  Card, Renal, EP  Care Discussed with Consultants/Other Providers: Card, EP , Renal

## 2018-05-04 NOTE — PROGRESS NOTE ADULT - PROBLEM SELECTOR PLAN 1
-No further bradycardia episode overnight  -Metoprolol has been on hold due to previous episode of bradycardia, patient will need betablocker post Micra implant for his Diastolic HF and HTN  -On Coumadin for AFib with INR 1.70 today  -Plan for Micra PPM implant today, maintain NPO

## 2018-05-04 NOTE — PROVIDER CONTACT NOTE (OTHER) - SITUATION
HR 49 then pt had a pause of 3.821 seconds on telemetry
3.06 seconds pause on telemtry
6 beats WCT and pt. went up to 140s, back down to 90s, afib on monitor
Bradycardic to 34 beats/min for 1 second
HR 39 on telemetry
Hypertension and Bradycardia
Patient refusing hobbs cath insertion
Pause 3.56 seconds/ HR 30

## 2018-05-04 NOTE — PROGRESS NOTE ADULT - ASSESSMENT
86 y/o Male with hx of Persistent AF on Coumadin, HTN, Diastolic HF, moderate AS on last echo p/w atypical chest pain and shortness of breath likely acute on chronic HFpEF. Patient with episode of WCT 150s and Afib with slow ventricular response in the 30's on Metoprolol now being held.  Currently NPO for Micra implant today.

## 2018-05-04 NOTE — PROVIDER CONTACT NOTE (OTHER) - ASSESSMENT
A&Ox4. /74, HR 55, temp98, O2sat 96%on supplemental O2. Pt asymptomatic.
A&Ox4. Pt was working with PT at time of ectopy. Pt felt SOB during PT. Pt denies CP/palpitations. Pt sitting comfortable in chair.
Pt asymptomatic.
A&Ox4, /69, HR 56, temp98, RR18, L7mck15% on supplemental O2. Pt was sleeping at the time of the event. Pt denies any discomfort.
A&Ox4. /71,  hr55, rr16, O2sat 96%on supplemental O2. Pt is asymptomatic.
Patient A&Ox4, asymptomatic, denies chest pain, dizziness or SOB. /78 HR 50.
Patient A&Ox4, bladder scan showed 480 ml. Explanation of the indications of hobbs catheter and it importance given, patient still refusing stating, 'I will try again in the morning'
Patient alert and oriented x4, /81 HR 50. Patient bradycardic to 38 for one second. Patient asymptomatic, denies chest pain, SOB or discomfort.
Pt had 3.56 second pause and HR briefly dropped to 30bpm. Pt sitting in chair, asymptomatic at time of event. Pt denies SOB, CP, palpitations. T 98.2 oral, /58, HR 47, RR 18, SpO2 97% on 1L via NC. Pt A-fib on tele
VSS, asymptomatic.

## 2018-05-04 NOTE — PROGRESS NOTE ADULT - PROBLEM SELECTOR PLAN 2
Feels good, no SOB. Walking in hallway with O2 sat is 91% off oxygen. reviewed labs, imaging TTE: Severe Pulmonary HTN, moderate AS with EF 75-80%.  - Nephrology and cardiology recommendation noted. Micra PPM today  - C/W Lasix 80mg PO q12hrs today, Hydralazine 25mg BID, will resume Metoprolol 25mg bid and Losartan. On discharge Metolazone 2.5mg bid (Per Renal)  - daily I/O and weight

## 2018-05-04 NOTE — PROVIDER CONTACT NOTE (OTHER) - RECOMMENDATIONS
Pt is asymptomatic, continue to monitor pt
Continue to monitor
Assess patient at bedside.
Assess patient, review medications
PA to review chart and to assess pt, RN to continue to monitor
Pt is asymptomatic, Review chart, continue to monitor pt
Review chart.
Review chart.
continue to monitor

## 2018-05-04 NOTE — PROGRESS NOTE ADULT - PROBLEM SELECTOR PLAN 2
-BNP level 14,084 yesterday, patient currently being diuresed with Lasix 80mg BID  with unchanged lower extremity edema  -patient is able to lay flat with lungs clear to auscultation today, discussed with Dr. Arriaga who states patient is cleared for Micra placement today    JANET Ayala [phone]

## 2018-05-04 NOTE — PROVIDER CONTACT NOTE (OTHER) - BACKGROUND
Admitted with SOB. Hx of Afib on Coumadin and CHF. Pt had a total of 100mg of Metoprolol today.
Admitted for CHF exacerbation.
Dx: SOB  Hx: CHF, AFIB, HTN, ARF with hypoxia
Admitted with SOB. Hx of CHF, Afib, & HTN. Pt had pauses before on telemetry.  Pt is on Metoprolol 37.5mg BID. Next dose is this am at 0600.
Admitted with SOB. Hx of CHF, Afib, HTN. Pt received Metoprolol 37.5mg this am.
Patient admitted for CHF exacerbation, SOB and TRAVIS
Patient admitted for CHF exacerbation, s/p IV lasix with 100 ml urine output.
Patient admitted for ER with CHF exacerbation and TRAVIS
Pt admitted for CHF/ respiratory failure
admitted with CHF and HTN urgency, pending PPM

## 2018-05-04 NOTE — PROVIDER CONTACT NOTE (OTHER) - REASON
6 beats WCT
6 beats WCT and pt. went up to 140s, back down to 90s, afib on monitor
Bradycardia
Bradycardia
Episode of pause on telemetry
Hypertension, Bradycardia
Patient refusing hobbs insertion
Pause 3.56 seconds/ HR 30
Pt had 17 beats WCT.
Bradycardia & new episode of pause on telemetry

## 2018-05-05 LAB
ALBUMIN SERPL ELPH-MCNC: 3.1 G/DL — LOW (ref 3.3–5)
ALP SERPL-CCNC: 61 U/L — SIGNIFICANT CHANGE UP (ref 40–120)
ALT FLD-CCNC: 13 U/L — SIGNIFICANT CHANGE UP (ref 10–45)
ANION GAP SERPL CALC-SCNC: 13 MMOL/L — SIGNIFICANT CHANGE UP (ref 5–17)
AST SERPL-CCNC: 14 U/L — SIGNIFICANT CHANGE UP (ref 10–40)
BILIRUB SERPL-MCNC: 0.4 MG/DL — SIGNIFICANT CHANGE UP (ref 0.2–1.2)
BUN SERPL-MCNC: 83 MG/DL — HIGH (ref 7–23)
CALCIUM SERPL-MCNC: 8.5 MG/DL — SIGNIFICANT CHANGE UP (ref 8.4–10.5)
CHLORIDE SERPL-SCNC: 106 MMOL/L — SIGNIFICANT CHANGE UP (ref 96–108)
CO2 SERPL-SCNC: 26 MMOL/L — SIGNIFICANT CHANGE UP (ref 22–31)
CREAT SERPL-MCNC: 2.92 MG/DL — HIGH (ref 0.5–1.3)
EPO SERPL-MCNC: 33.8 MIU/ML — HIGH (ref 2.6–18.5)
GLUCOSE SERPL-MCNC: 117 MG/DL — HIGH (ref 70–99)
HCT VFR BLD CALC: 29.8 % — LOW (ref 39–50)
HGB BLD-MCNC: 10.2 G/DL — LOW (ref 13–17)
INR BLD: 1.7 RATIO — HIGH (ref 0.88–1.16)
MAGNESIUM SERPL-MCNC: 2 MG/DL — SIGNIFICANT CHANGE UP (ref 1.6–2.6)
MCHC RBC-ENTMCNC: 30.2 PG — SIGNIFICANT CHANGE UP (ref 27–34)
MCHC RBC-ENTMCNC: 34.1 GM/DL — SIGNIFICANT CHANGE UP (ref 32–36)
MCV RBC AUTO: 88.7 FL — SIGNIFICANT CHANGE UP (ref 80–100)
PHOSPHATE SERPL-MCNC: 3.8 MG/DL — SIGNIFICANT CHANGE UP (ref 2.5–4.5)
PLATELET # BLD AUTO: 284 K/UL — SIGNIFICANT CHANGE UP (ref 150–400)
POTASSIUM SERPL-MCNC: 3.9 MMOL/L — SIGNIFICANT CHANGE UP (ref 3.5–5.3)
POTASSIUM SERPL-SCNC: 3.9 MMOL/L — SIGNIFICANT CHANGE UP (ref 3.5–5.3)
PROT SERPL-MCNC: 5.6 G/DL — LOW (ref 6–8.3)
PROTHROM AB SERPL-ACNC: 18.7 SEC — HIGH (ref 9.8–12.7)
RBC # BLD: 3.36 M/UL — LOW (ref 4.2–5.8)
RBC # FLD: 15.5 % — HIGH (ref 10.3–14.5)
SODIUM SERPL-SCNC: 145 MMOL/L — SIGNIFICANT CHANGE UP (ref 135–145)
WBC # BLD: 8.9 K/UL — SIGNIFICANT CHANGE UP (ref 3.8–10.5)
WBC # FLD AUTO: 8.9 K/UL — SIGNIFICANT CHANGE UP (ref 3.8–10.5)

## 2018-05-05 PROCEDURE — 99233 SBSQ HOSP IP/OBS HIGH 50: CPT

## 2018-05-05 RX ORDER — CALAMINE 8% AND ZINC OXIDE 8% 160 MG/ML
1 LOTION TOPICAL
Qty: 0 | Refills: 0 | Status: DISCONTINUED | OUTPATIENT
Start: 2018-05-05 | End: 2018-05-06

## 2018-05-05 RX ORDER — WARFARIN SODIUM 2.5 MG/1
3 TABLET ORAL ONCE
Qty: 0 | Refills: 0 | Status: COMPLETED | OUTPATIENT
Start: 2018-05-05 | End: 2018-05-05

## 2018-05-05 RX ADMIN — Medication 100 MILLIGRAM(S): at 05:24

## 2018-05-05 RX ADMIN — Medication 80 MILLIGRAM(S): at 17:36

## 2018-05-05 RX ADMIN — Medication 25 MILLIGRAM(S): at 05:24

## 2018-05-05 RX ADMIN — CALAMINE 8% AND ZINC OXIDE 8% 1 APPLICATION(S): 160 LOTION TOPICAL at 01:46

## 2018-05-05 RX ADMIN — CALAMINE 8% AND ZINC OXIDE 8% 1 APPLICATION(S): 160 LOTION TOPICAL at 17:36

## 2018-05-05 RX ADMIN — Medication 80 MILLIGRAM(S): at 05:24

## 2018-05-05 RX ADMIN — LOSARTAN POTASSIUM 25 MILLIGRAM(S): 100 TABLET, FILM COATED ORAL at 17:37

## 2018-05-05 RX ADMIN — Medication 25 MILLIGRAM(S): at 18:42

## 2018-05-05 RX ADMIN — FINASTERIDE 5 MILLIGRAM(S): 5 TABLET, FILM COATED ORAL at 09:37

## 2018-05-05 RX ADMIN — WARFARIN SODIUM 3 MILLIGRAM(S): 2.5 TABLET ORAL at 21:41

## 2018-05-05 NOTE — PROGRESS NOTE ADULT - ASSESSMENT
86 y/o Male with hx of Persistent AF on Coumadin, HTN, Diastolic HF, moderate AS on last echo p/w atypical chest pain and shortness of breath likely acute on chronic HFpEF. Patient with episode of tachycardia 150s and Afib with slow ventricular response with HR in the 30's. Betablocker held at the time.  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

## 2018-05-05 NOTE — PROGRESS NOTE ADULT - PROBLEM SELECTOR PLAN 2
O2% improving, now 95% on RA this AM  - Nephrology and cardiology recommendation noted  - C/W Lasix 80mg PO q12hrs today, Hydralazine 25mg BID,  Metoprolol 25mg bid and Losartan  - daily I/O and weight

## 2018-05-05 NOTE — PROGRESS NOTE ADULT - SUBJECTIVE AND OBJECTIVE BOX
24H hour events: No over night events. Patient resting comfortably in chair without complaints. Denies c/o CP, palpitations or SOB.     MEDICATIONS:  furosemide    Tablet 80 milliGRAM(s) Oral two times a day  hydrALAZINE 25 milliGRAM(s) Oral every 12 hours  losartan 25 milliGRAM(s) Oral daily  metoprolol tartrate 25 milliGRAM(s) Oral two times a day  warfarin 3 milliGRAM(s) Oral once      docusate sodium 100 milliGRAM(s) Oral three times a day  senna 2 Tablet(s) Oral at bedtime    finasteride 5 milliGRAM(s) Oral daily    calamine Lotion 1 Application(s) Topical four times a day  sodium chloride 0.65% Nasal 1 Spray(s) Both Nostrils every 2 hours PRN      REVIEW OF SYSTEMS:  Complete 10point ROS negative.    PHYSICAL EXAM:  T(C): 36.7 (05-05-18 @ 04:14), Max: 37 (05-04-18 @ 21:47)  HR: 73 (05-05-18 @ 04:14) (73 - 103)  BP: 123/65 (05-05-18 @ 04:14) (123/65 - 163/84)  RR: 18 (05-05-18 @ 04:30) (18 - 19)  SpO2: 94% (05-05-18 @ 04:30) (92% - 97%)      04 May 2018 07:01  -  05 May 2018 07:00  --------------------------------------------------------  IN: 0 mL / OUT: 660 mL / NET: -660 mL    05 May 2018 07:01  -  05 May 2018 11:34  --------------------------------------------------------  IN: 0 mL / OUT: 300 mL / NET: -300 mL      Appearance: Normal	  Cardiovascular: Normal S1 S2, regular.   Respiratory: Lungs clear to auscultation	  Psychiatry: A & O x 3, Mood & affect appropriate  Gastrointestinal:  Soft, Non-tender, + BS	  Skin: Right groin open to air, s/p suture removal over night. No bleeding or hematoma.  No rashes, No ecchymoses, No cyanosis	  Neurologic: Non-focal  Extremities: Normal range of motion, No clubbing, cyanosis. + 1 pitting BL/LE edema.   Vascular: Peripheral pulses palpable 2+ bilaterally    LABS:	 	    CBC Full  -  ( 05 May 2018 06:25 )  WBC Count : 8.9 K/uL  Hemoglobin : 10.2 g/dL  Hematocrit : 29.8 %  Platelet Count - Automated : 284 K/uL  Mean Cell Volume : 88.7 fl  Mean Cell Hemoglobin : 30.2 pg  Mean Cell Hemoglobin Concentration : 34.1 gm/dL  Auto Neutrophil # : x  Auto Lymphocyte # : x  Auto Monocyte # : x  Auto Eosinophil # : x  Auto Basophil # : x  Auto Neutrophil % : x  Auto Lymphocyte % : x  Auto Monocyte % : x  Auto Eosinophil % : x  Auto Basophil % : x    05-05    145  |  106  |  83<H>  ----------------------------<  117<H>  3.9   |  26  |  2.92<H>  05-04    144  |  104  |  84<H>  ----------------------------<  112<H>  4.1   |  25  |  3.00<H>    Ca    8.5      05 May 2018 06:25  Ca    8.4      04 May 2018 06:42  Phos  3.8     05-05  Phos  3.4     05-04  Mg     2.0     05-05  Mg     2.0     05-04    TPro  5.6<L>  /  Alb  3.1<L>  /  TBili  0.4  /  DBili  x   /  AST  14  /  ALT  13  /  AlkPhos  61  05-05      proBNP: Serum Pro-Brain Natriuretic Peptide: 03856 pg/mL (05-03 @ 06:39)          TELEMETRY: 	  AFib, V Paced 60's- 90's     CXray: Impression: Stable marked cardiomegaly. Grossly clear lungs.

## 2018-05-05 NOTE — PROGRESS NOTE ADULT - PROBLEM SELECTOR PLAN 3
Cr is around 3 now (baseline 2.8). Renal US showed right renal atrophy. Has been having proteinuria. T2DM controlled with HbA1C 5.5%  - Hema Bob f/u appreciated. c/w Lasix 80mg bid and Hydralazine  - Outpatient f/u with Renal. Daughter aware  - Avoid nephrotoxins, f/u renal recs  - Heme c/s appreciated, outpt f/u

## 2018-05-05 NOTE — PROGRESS NOTE ADULT - ASSESSMENT
88 y/o man w/ pmhx AFib, diastolic CHF (last echo noted 4/2018: LV 60%, severe pulm. htn, mod. 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

## 2018-05-05 NOTE — PROGRESS NOTE ADULT - PROBLEM SELECTOR PLAN 4
The patient has been having proteinuria from before (old record in the chart from 6/13/17). DDx- Glomerular, Tubular, overflow or postrenal. SPEP is abnormal but keppa/Lamda ratio is normal.  - Appreciated Heme consult and plan, outpatient f/u at [facility] [place], might need BM biopsy to r/o MDS  - Losartan resumed per Renal.

## 2018-05-05 NOTE — PROGRESS NOTE ADULT - SUBJECTIVE AND OBJECTIVE BOX
[facility]   [phone]  RENAL FOLLOW UP NOTE  --------------------------------------------------------------------------------  Chief Complaint:    24 hour events/subjective:    states breathing is improving. also edema in legs has decreased     PAST HISTORY  --------------------------------------------------------------------------------  No significant changes to PMH, PSH, FHx, SHx, unless otherwise noted    ALLERGIES & MEDICATIONS  --------------------------------------------------------------------------------  Allergies    No Known Allergies    Intolerances      Standing Inpatient Medications  calamine Lotion 1 Application(s) Topical four times a day  docusate sodium 100 milliGRAM(s) Oral three times a day  finasteride 5 milliGRAM(s) Oral daily  furosemide    Tablet 80 milliGRAM(s) Oral two times a day  hydrALAZINE 25 milliGRAM(s) Oral every 12 hours  losartan 25 milliGRAM(s) Oral daily  metoprolol tartrate 25 milliGRAM(s) Oral two times a day  senna 2 Tablet(s) Oral at bedtime  warfarin 3 milliGRAM(s) Oral once    PRN Inpatient Medications  sodium chloride 0.65% Nasal 1 Spray(s) Both Nostrils every 2 hours PRN      REVIEW OF SYSTEMS  --------------------------------------------------------------------------------  Gen: denies fatigue, fevers/chills,  CVS: denies chest pain/palpitations  Resp: denies SOB/Cough  GI: Denies N/V/Abd pain  : Denies dysuria/oliguria/hematuria    All other systems were reviewed and are negative, except as noted.  VITALS/PHYSICAL EXAM  --------------------------------------------------------------------------------  T(C): 36.7 (05-05-18 @ 04:14), Max: 37 (05-04-18 @ 21:47)  HR: 73 (05-05-18 @ 04:14) (73 - 103)  BP: 123/65 (05-05-18 @ 04:14) (123/65 - 163/84)  RR: 18 (05-05-18 @ 04:30) (18 - 19)  SpO2: 94% (05-05-18 @ 04:30) (92% - 95%)  Wt(kg): --        05-04-18 @ 07:01  -  05-05-18 @ 07:00  --------------------------------------------------------  IN: 0 mL / OUT: 660 mL / NET: -660 mL    05-05-18 @ 07:01  -  05-05-18 @ 12:36  --------------------------------------------------------  IN: 0 mL / OUT: 300 mL / NET: -300 mL      Physical Exam:  	    Physical Exam:  	Gen: alert oriented place person and date   	Pulm: Decreased breath sounds b/l bases. no rales or ronchi or wheezing  	CV: RRR, S1/S2. no rub  	Back: No CVA tenderness; no sacral edema  	Abd: +BS, soft, nontender/nondistended  	: No suprapubic tenderness.               Extremity: No cyanosis, 1+  edema no clubbing  	  LABS/STUDIES  --------------------------------------------------------------------------------              10.2   8.9   >-----------<  284      [05-05-18 @ 06:25]              29.8     145  |  106  |  83  ----------------------------<  117      [05-05-18 @ 06:25]  3.9   |  26  |  2.92        Ca     8.5     [05-05-18 @ 06:25]      Mg     2.0     [05-05-18 @ 06:25]      Phos  3.8     [05-05-18 @ 06:25]    TPro  5.6  /  Alb  3.1  /  TBili  0.4  /  DBili  x   /  AST  14  /  ALT  13  /  AlkPhos  61  [05-05-18 @ 06:25]    PT/INR: PT 18.7 , INR 1.70       [05-05-18 @ 06:25]  PTT: 35.1       [05-04-18 @ 06:43]      Creatinine Trend:  SCr 2.92 [05-05 @ 06:25]  SCr 3.00 [05-04 @ 06:42]  SCr 2.89 [05-03 @ 06:39]  SCr 2.98 [05-02 @ 06:25]  SCr 3.13 [05-01 @ 05:53]      Urinalysis - [04-24-18 @ 08:24]      Color Yellow / Appearance Clear / SG 1.012 / pH 5.5      Gluc Negative / Ketone Negative  / Bili Negative / Urobili Negative       Blood Negative / Protein 300 / Leuk Est Negative / Nitrite Negative      RBC 0-2 / WBC 0-2 / Hyaline  / Gran  / Sq Epi  / Non Sq Epi  / Bacteria Few      Iron 46, TIBC 180, %sat 26      [05-03-18 @ 11:09]  Ferritin 276      [05-03-18 @ 09:42]  HbA1c 5.5      [04-25-18 @ 07:44]  TSH 2.32      [04-25-18 @ 07:53]  Lipid: chol 135, , HDL 25, LDL 76      [04-25-18 @ 07:55]    PUSHPA: titer Negative, pattern --      [04-25-18 @ 08:03]  C3 Complement 86      [04-25-18 @ 08:03]  C4 Complement 28      [04-25-18 @ 08:03]  Rheumatoid Factor <7      [04-25-18 @ 07:55]  ANCA: cANCA Negative, pANCA Negative, atypical ANCA Negative      [04-26-18 @ 07:45]  MPO-ANCA <5.0, interpretation: Negative      [04-26-18 @ 07:45]  PR3-ANCA <5.0, interpretation: Negative      [04-26-18 @ 07:45]  Free Light Chains: kappa 9.05, lambda 5.76, ratio = 1.57      [04-27 @ 10:13]  Immunofixation Serum:   No Monoclonal Band Identified      [05-03-18 @ 08:25]  SPEP Interpretation: Normal Electrophoresis Pattern      [04-24-18 @ 19:22]  Immunofixation Urine: No Monoclonal Band Identified      [04-26-18 @ 13:36]  UPEP Interpretation: Mild Selective (Glomerular) Proteinuria      [04-26-18 @ 13:36]

## 2018-05-05 NOTE — PROGRESS NOTE ADULT - PROBLEM SELECTOR PLAN 1
Patient s/p PPM yesterday by EP w/o complication.  Now V-paced and AFib on tele  - c/w Metoprolol 25mg bid, f/u w/ cardiology re: uptitrate if HR > 100 given now w/ PPM  - c/w coumadin (INR 2-3)

## 2018-05-05 NOTE — PROGRESS NOTE ADULT - PROBLEM SELECTOR PLAN 2
-currently being diuresed with Lasix 80mg BID with improving lower extremity edema  -Monitor intake and output/ daily weights       JANET Ayala -[id number] -currently being diuresed with Lasix 80mg BID with improving lower extremity edema  -Monitor intake and output/ daily weights   -EP will sign off, reconsult as needed     JANET Ayala -[id number]

## 2018-05-05 NOTE — PROGRESS NOTE ADULT - SUBJECTIVE AND OBJECTIVE BOX
Patient is a 87y old  Male who presents with a chief complaint of SOB, LE edema.    SUBJECTIVE / OVERNIGHT EVENTS: Patient seen and examined.  He feels well today, SOB improved and off O2, denies CP.  Still some LE edema.  He has been minimally ambulatory since yesterday. No complications w/ PPM implantation yesterday.  Tele: Afib and V-paced 60s-90s.    MEDICATIONS  (STANDING):  calamine Lotion 1 Application(s) Topical four times a day  docusate sodium 100 milliGRAM(s) Oral three times a day  finasteride 5 milliGRAM(s) Oral daily  furosemide    Tablet 80 milliGRAM(s) Oral two times a day  hydrALAZINE 25 milliGRAM(s) Oral every 12 hours  losartan 25 milliGRAM(s) Oral daily  metoprolol tartrate 25 milliGRAM(s) Oral two times a day  senna 2 Tablet(s) Oral at bedtime  warfarin 3 milliGRAM(s) Oral once    MEDICATIONS  (PRN):  sodium chloride 0.65% Nasal 1 Spray(s) Both Nostrils every 2 hours PRN nasal dryness      I&O's Summary    04 May 2018 07:01  -  05 May 2018 07:00  --------------------------------------------------------  IN: 0 mL / OUT: 660 mL / NET: -660 mL    05 May 2018 07:01  -  05 May 2018 18:55  --------------------------------------------------------  IN: 360 mL / OUT: 800 mL / NET: -440 mL      Vital Signs Last 24 Hrs  T(C): 36.7 (05 May 2018 12:39), Max: 37 (04 May 2018 21:47)  T(F): 98.1 (05 May 2018 12:39), Max: 98.6 (04 May 2018 21:47)  HR: 72 (05 May 2018 18:40) (72 - 87)  BP: 138/72 (05 May 2018 18:40) (123/65 - 151/75)  BP(mean): --  RR: 17 (05 May 2018 12:39) (17 - 18)  SpO2: 95% (05 May 2018 12:39) (92% - 95%)    PHYSICAL EXAM:-  GENERAL: NAD, well-developed  EYES: EOMI, PERRLA, conjunctiva and sclera clear  NECK: Supple, No JVD, no thyromegaly  CHEST/LUNG: Clear to auscultation bilaterally; No wheeze  HEART: irregular rate and rhythm; S1, S2 audible, No murmurs, rubs, or gallops  ABDOMEN: Soft, Nontender, Nondistended; Bowel sounds present  EXTREMITIES:  2+ Peripheral Pulses, No clubbing, cyanosis, 1+ edema  NEURO: AAOx3, no focal deficit      LABS:           (05-05 @ 06:25)                      10.2  8.9 )-----------( 284                 29.8    Neutrophils = -- (--%)  Lymphocytes = -- (--%)  Eosinophils = -- (--%)  Basophils = -- (--%)  Monocytes = -- (--%)  Bands = --%    05-05    145  |  106  |  83<H>  ----------------------------<  117<H>  3.9   |  26  |  2.92<H>    Ca    8.5      05 May 2018 06:25  Phos  3.8     05-05  Mg     2.0     05-05    TPro  5.6<L>  /  Alb  3.1<L>  /  TBili  0.4  /  DBili  x   /  AST  14  /  ALT  13  /  AlkPhos  61  05-05    ( 05 May 2018 06:25 )   PT: 18.7 sec;   INR: 1.70 ratio;       PTT:35.1 sec    RADIOLOGY & ADDITIONAL TESTS:    Imaging Personally Reviewed:   Consultant(s) Notes Reviewed:  Card, Renal, EP  Care Discussed with Consultants/Other Providers: Cards, EP

## 2018-05-05 NOTE — PROGRESS NOTE ADULT - ASSESSMENT
87 y.o male w/ pmhx a.fib, diastolic CHF (last echo noted 4/2015: LV 60%, severe pulm. htn, mod. AS), HTN presenting w/ c/o of CP x 1 day with sob/chf as well as htn urgency    1- CKD IV suspected and progressive in nature given hx of baseline cr 2.56 in 2016 as documented  2- HTN   3- CHF  4- nephrotic syndrome     serologies are neg for  c-anca/p-anca MPO and anti-proteinase 3 assay despite lung granulomas  hydralazine 25mg bid  suspect ckd IV/proteinuria due to atrophic kidney and parenchymal loss   lasix 80 mg po bid   cr stable at this level.   keep O> I   started losartan 25 mg qd with goal to increase dose as tolerated

## 2018-05-05 NOTE — PROGRESS NOTE ADULT - PROBLEM SELECTOR PLAN 7
Initially he was in Hypertensive crisis during admission, now improved on stable dosing of antihypertensives.  BP has been better on Hydralazine and diuresis with Lasix.

## 2018-05-05 NOTE — PROGRESS NOTE ADULT - PROBLEM SELECTOR PLAN 1
-s/p Micra PPM 5/4, tolerated procedure well.   -Metoprolol resumed.  -Daily Coumadin dosing AFib with INR 1.70 today  -Follow up in EP Clinic 5/16 at 8:10am for wound and device check   -Post Micra PPM restrictions reviewed with patient

## 2018-05-06 LAB
ANION GAP SERPL CALC-SCNC: 14 MMOL/L — SIGNIFICANT CHANGE UP (ref 5–17)
APTT BLD: 35 SEC — SIGNIFICANT CHANGE UP (ref 27.5–37.4)
BUN SERPL-MCNC: 78 MG/DL — HIGH (ref 7–23)
CALCIUM SERPL-MCNC: 8.1 MG/DL — LOW (ref 8.4–10.5)
CHLORIDE SERPL-SCNC: 104 MMOL/L — SIGNIFICANT CHANGE UP (ref 96–108)
CO2 SERPL-SCNC: 25 MMOL/L — SIGNIFICANT CHANGE UP (ref 22–31)
CREAT SERPL-MCNC: 2.8 MG/DL — HIGH (ref 0.5–1.3)
GLUCOSE SERPL-MCNC: 122 MG/DL — HIGH (ref 70–99)
INR BLD: 1.97 RATIO — HIGH (ref 0.88–1.16)
POTASSIUM SERPL-MCNC: 3.8 MMOL/L — SIGNIFICANT CHANGE UP (ref 3.5–5.3)
POTASSIUM SERPL-SCNC: 3.8 MMOL/L — SIGNIFICANT CHANGE UP (ref 3.5–5.3)
PROTHROM AB SERPL-ACNC: 21.6 SEC — HIGH (ref 9.8–12.7)
SODIUM SERPL-SCNC: 143 MMOL/L — SIGNIFICANT CHANGE UP (ref 135–145)

## 2018-05-06 PROCEDURE — 99233 SBSQ HOSP IP/OBS HIGH 50: CPT

## 2018-05-06 RX ORDER — METOPROLOL TARTRATE 50 MG
0 TABLET ORAL
Qty: 0 | Refills: 0 | COMMUNITY

## 2018-05-06 RX ORDER — SODIUM CHLORIDE 0.65 %
1 AEROSOL, SPRAY (ML) NASAL
Qty: 1 | Refills: 0
Start: 2018-05-06 | End: 2018-06-04

## 2018-05-06 RX ORDER — FUROSEMIDE 40 MG
1 TABLET ORAL
Qty: 60 | Refills: 0
Start: 2018-05-06 | End: 2018-06-04

## 2018-05-06 RX ORDER — FINASTERIDE 5 MG/1
1 TABLET, FILM COATED ORAL
Qty: 30 | Refills: 0
Start: 2018-05-06 | End: 2018-06-04

## 2018-05-06 RX ORDER — WARFARIN SODIUM 2.5 MG/1
3 TABLET ORAL ONCE
Qty: 0 | Refills: 0 | Status: COMPLETED | OUTPATIENT
Start: 2018-05-06 | End: 2018-05-06

## 2018-05-06 RX ORDER — PETROLATUM,WHITE
1 JELLY (GRAM) TOPICAL
Qty: 0 | Refills: 0 | Status: DISCONTINUED | OUTPATIENT
Start: 2018-05-06 | End: 2018-05-08

## 2018-05-06 RX ORDER — POTASSIUM CHLORIDE 20 MEQ
0 PACKET (EA) ORAL
Qty: 0 | Refills: 0 | COMMUNITY

## 2018-05-06 RX ORDER — HYDRALAZINE HCL 50 MG
1 TABLET ORAL
Qty: 60 | Refills: 0 | OUTPATIENT
Start: 2018-05-06 | End: 2018-06-04

## 2018-05-06 RX ORDER — LOSARTAN POTASSIUM 100 MG/1
1 TABLET, FILM COATED ORAL
Qty: 30 | Refills: 0
Start: 2018-05-06 | End: 2018-06-04

## 2018-05-06 RX ORDER — DIPHENHYDRAMINE HCL 50 MG
25 CAPSULE ORAL ONCE
Qty: 0 | Refills: 0 | Status: COMPLETED | OUTPATIENT
Start: 2018-05-06 | End: 2018-05-06

## 2018-05-06 RX ORDER — PETROLATUM,WHITE
1 JELLY (GRAM) TOPICAL
Qty: 1 | Refills: 0
Start: 2018-05-06 | End: 2018-06-04

## 2018-05-06 RX ORDER — FUROSEMIDE 40 MG
1 TABLET ORAL
Qty: 0 | Refills: 0 | COMMUNITY

## 2018-05-06 RX ORDER — METOPROLOL TARTRATE 50 MG
1 TABLET ORAL
Qty: 60 | Refills: 0
Start: 2018-05-06 | End: 2018-06-04

## 2018-05-06 RX ADMIN — Medication 25 MILLIGRAM(S): at 16:28

## 2018-05-06 RX ADMIN — LOSARTAN POTASSIUM 25 MILLIGRAM(S): 100 TABLET, FILM COATED ORAL at 16:28

## 2018-05-06 RX ADMIN — CALAMINE 8% AND ZINC OXIDE 8% 1 APPLICATION(S): 160 LOTION TOPICAL at 00:03

## 2018-05-06 RX ADMIN — Medication 80 MILLIGRAM(S): at 16:28

## 2018-05-06 RX ADMIN — CALAMINE 8% AND ZINC OXIDE 8% 1 APPLICATION(S): 160 LOTION TOPICAL at 09:45

## 2018-05-06 RX ADMIN — Medication 1 APPLICATION(S): at 16:27

## 2018-05-06 RX ADMIN — CALAMINE 8% AND ZINC OXIDE 8% 1 APPLICATION(S): 160 LOTION TOPICAL at 05:22

## 2018-05-06 RX ADMIN — Medication 25 MILLIGRAM(S): at 05:49

## 2018-05-06 RX ADMIN — Medication 25 MILLIGRAM(S): at 05:22

## 2018-05-06 RX ADMIN — FINASTERIDE 5 MILLIGRAM(S): 5 TABLET, FILM COATED ORAL at 09:45

## 2018-05-06 RX ADMIN — Medication 80 MILLIGRAM(S): at 05:22

## 2018-05-06 RX ADMIN — WARFARIN SODIUM 3 MILLIGRAM(S): 2.5 TABLET ORAL at 21:05

## 2018-05-06 NOTE — PROGRESS NOTE ADULT - SUBJECTIVE AND OBJECTIVE BOX
Patient is a 87y old  Male who presents with a chief complaint of SOB, LE edema.    SUBJECTIVE / OVERNIGHT EVENTS: Patient seen and examined.  He feels well today, SOB improved and off O2, denies CP.  He was able to ambulate with a walker about 20 feet with PT today.    REVIEW OF SYSTEMS    General: No fevers, chills  	  Ophthalmologic: No change in vision    Respiratory and Thorax: No SOB or cough  	  Cardiovascular: No chest pain, palpitations. + LE edema    Gastrointestinal: No abdominal pain, nausea, vomiting, or diarrhea    Genitourinary: No dysuria or polyuria    MEDICATIONS  (STANDING):  AQUAPHOR (petrolatum Ointment) 1 Application(s) Topical two times a day  docusate sodium 100 milliGRAM(s) Oral three times a day  finasteride 5 milliGRAM(s) Oral daily  furosemide    Tablet 80 milliGRAM(s) Oral two times a day  hydrALAZINE 25 milliGRAM(s) Oral every 12 hours  losartan 25 milliGRAM(s) Oral daily  metoprolol tartrate 25 milliGRAM(s) Oral two times a day  senna 2 Tablet(s) Oral at bedtime  warfarin 3 milliGRAM(s) Oral once    MEDICATIONS  (PRN):  sodium chloride 0.65% Nasal 1 Spray(s) Both Nostrils every 2 hours PRN nasal dryness      I&O's Summary    05 May 2018 07:01  -  06 May 2018 07:00  --------------------------------------------------------  IN: 690 mL / OUT: 950 mL / NET: -260 mL    06 May 2018 07:01  -  06 May 2018 17:07  --------------------------------------------------------  IN: 480 mL / OUT: 1000 mL / NET: -520 mL    Vital Signs Last 24 Hrs  T(C): 36.6 (06 May 2018 12:19), Max: 36.8 (05 May 2018 21:25)  T(F): 97.8 (06 May 2018 12:19), Max: 98.2 (05 May 2018 21:25)  HR: 76 (06 May 2018 16:26) (70 - 85)  BP: 153/61 (06 May 2018 16:26) (126/71 - 153/61)  BP(mean): --  RR: 18 (06 May 2018 12:19) (18 - 18)  SpO2: 97% (06 May 2018 12:19) (96% - 97%)    PHYSICAL EXAM:  GENERAL: NAD, well-developed  EYES: EOMI, PERRLA, conjunctiva and sclera clear  NECK: Supple, No JVD, no thyromegaly  CHEST/LUNG: Clear to auscultation bilaterally; No wheeze  HEART: irregular rate and rhythm; S1, S2 audible, No murmurs, rubs, or gallops  ABDOMEN: Soft, Nontender, Nondistended; Bowel sounds present  EXTREMITIES:  2+ Peripheral Pulses, No clubbing, cyanosis, 1+ edema  NEURO: AAOx3, no focal deficit      LABS:             (05-05 @ 06:25)                      10.2  8.9 )-----------( 284                 29.8    Neutrophils = -- (--%)  Lymphocytes = -- (--%)  Eosinophils = -- (--%)  Basophils = -- (--%)  Monocytes = -- (--%)  Bands = --%    05-06    143  |  104  |  78<H>  ----------------------------<  122<H>  3.8   |  25  |  2.80<H>    Ca    8.1<L>      06 May 2018 06:54  Phos  3.8     05-05  Mg     2.0     05-05    TPro  5.6<L>  /  Alb  3.1<L>  /  TBili  0.4  /  DBili  x   /  AST  14  /  ALT  13  /  AlkPhos  61  05-05    ( 06 May 2018 06:54 )   PT: 21.6 sec;   INR: 1.97 ratio;       PTT:35.0 sec    RADIOLOGY & ADDITIONAL TESTS:    Imaging Personally Reviewed:   Consultant(s) Notes Reviewed:  Card, Renal, EP  Care Discussed with Consultants/Other Providers: Renal

## 2018-05-06 NOTE — PROGRESS NOTE ADULT - PROBLEM SELECTOR PLAN 2
Not hypoxic, patient stable on RA and w/ ambulation  - Nephrology and cardiology recommendation noted  - C/W Lasix 80mg PO q12hrs today, Hydralazine 25mg BID,  Metoprolol 25mg bid and Losartan 25 mg daily  - daily I/O and weight

## 2018-05-06 NOTE — PROGRESS NOTE ADULT - SUBJECTIVE AND OBJECTIVE BOX
[facility]   [phone]  RENAL FOLLOW UP NOTE  --------------------------------------------------------------------------------  Chief Complaint:    24 hour events/subjective:    states feels well and has no c/o sob     PAST HISTORY  --------------------------------------------------------------------------------  No significant changes to PMH, PSH, FHx, SHx, unless otherwise noted    ALLERGIES & MEDICATIONS  --------------------------------------------------------------------------------  Allergies    No Known Allergies    Intolerances      Standing Inpatient Medications  calamine Lotion 1 Application(s) Topical four times a day  docusate sodium 100 milliGRAM(s) Oral three times a day  finasteride 5 milliGRAM(s) Oral daily  furosemide    Tablet 80 milliGRAM(s) Oral two times a day  hydrALAZINE 25 milliGRAM(s) Oral every 12 hours  losartan 25 milliGRAM(s) Oral daily  metoprolol tartrate 25 milliGRAM(s) Oral two times a day  senna 2 Tablet(s) Oral at bedtime    PRN Inpatient Medications  sodium chloride 0.65% Nasal 1 Spray(s) Both Nostrils every 2 hours PRN      REVIEW OF SYSTEMS  --------------------------------------------------------------------------------    fevers/chills,  CVS: denies chest pain/palpitations  Resp: denies SOB/Cough  GI: Denies N/V/Abd pain  : Denies dysuria/oliguria/hematuria    All other systems were reviewed and are negative, except as noted.    VITALS/PHYSICAL EXAM  --------------------------------------------------------------------------------  T(C): 36.6 (05-06-18 @ 04:12), Max: 36.8 (05-05-18 @ 21:25)  HR: 85 (05-06-18 @ 04:12) (70 - 85)  BP: 132/68 (05-06-18 @ 04:12) (126/71 - 147/74)  RR: 18 (05-06-18 @ 04:12) (17 - 18)  SpO2: 97% (05-06-18 @ 04:12) (95% - 97%)  Wt(kg): --        05-05-18 @ 07:01  -  05-06-18 @ 07:00  --------------------------------------------------------  IN: 690 mL / OUT: 950 mL / NET: -260 mL    05-06-18 @ 07:01  -  05-06-18 @ 10:45  --------------------------------------------------------  IN: 180 mL / OUT: 350 mL / NET: -170 mL        Physical Exam:  	Gen: alert oriented place person and date   	Pulm: Decreased breath sounds b/l bases. no rales or ronchi or wheezing  	CV: RRR, S1/S2. no rub  	Back: No CVA tenderness; no sacral edema  	Abd: +BS, soft, nontender/nondistended  	: No suprapubic tenderness.               Extremity: No cyanosis, 1+  edema no clubbing    LABS/STUDIES	  --------------------------------------------------------------------------------              10.2   8.9   >-----------<  284      [05-05-18 @ 06:25]              29.8     143  |  104  |  78  ----------------------------<  122      [05-06-18 @ 06:54]  3.8   |  25  |  2.80        Ca     8.1     [05-06-18 @ 06:54]      Mg     2.0     [05-05-18 @ 06:25]      Phos  3.8     [05-05-18 @ 06:25]    TPro  5.6  /  Alb  3.1  /  TBili  0.4  /  DBili  x   /  AST  14  /  ALT  13  /  AlkPhos  61  [05-05-18 @ 06:25]    PT/INR: PT 21.6 , INR 1.97       [05-06-18 @ 06:54]  PTT: 35.0       [05-06-18 @ 06:54]      Creatinine Trend:  SCr 2.80 [05-06 @ 06:54]  SCr 2.92 [05-05 @ 06:25]  SCr 3.00 [05-04 @ 06:42]  SCr 2.89 [05-03 @ 06:39]  SCr 2.98 [05-02 @ 06:25]        Urinalysis - [04-24-18 @ 08:24]      Color Yellow / Appearance Clear / SG 1.012 / pH 5.5      Gluc Negative / Ketone Negative  / Bili Negative / Urobili Negative       Blood Negative / Protein 300 / Leuk Est Negative / Nitrite Negative      RBC 0-2 / WBC 0-2 / Hyaline  / Gran  / Sq Epi  / Non Sq Epi  / Bacteria Few      Iron 46, TIBC 180, %sat 26      [05-03-18 @ 11:09]  Ferritin 276      [05-03-18 @ 09:42]  HbA1c 5.5      [04-25-18 @ 07:44]  TSH 2.32      [04-25-18 @ 07:53]  Lipid: chol 135, , HDL 25, LDL 76      [04-25-18 @ 07:55]    PUSHPA: titer Negative, pattern --      [04-25-18 @ 08:03]  C3 Complement 86      [04-25-18 @ 08:03]  C4 Complement 28      [04-25-18 @ 08:03]  Rheumatoid Factor <7      [04-25-18 @ 07:55]  ANCA: cANCA Negative, pANCA Negative, atypical ANCA Negative      [04-26-18 @ 07:45]  MPO-ANCA <5.0, interpretation: Negative      [04-26-18 @ 07:45]  PR3-ANCA <5.0, interpretation: Negative      [04-26-18 @ 07:45]  Free Light Chains: kappa 9.05, lambda 5.76, ratio = 1.57      [04-27 @ 10:13]  Immunofixation Serum:   No Monoclonal Band Identified      [05-03-18 @ 08:25]  SPEP Interpretation: Normal Electrophoresis Pattern      [04-24-18 @ 19:22]  Immunofixation Urine: No Monoclonal Band Identified      [04-26-18 @ 13:36]  UPEP Interpretation: Mild Selective (Glomerular) Proteinuria      [04-26-18 @ 13:36]

## 2018-05-06 NOTE — PROGRESS NOTE ADULT - ASSESSMENT
86 y/o man w/ PMH AFib, diastolic CHF (last echo noted 4/2018: LV 60%, severe pulm. htn, mod. 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

## 2018-05-06 NOTE — PROGRESS NOTE ADULT - PROBLEM SELECTOR PLAN 3
Cr downtrending towards baseline.  - Case d/w renal (Dr. Ibarra). c/w Lasix 80mg bid and Hydralazine  - Will uptitrate losartan dose as outpatient  - Outpatient f/u with Renal. Daughter aware

## 2018-05-06 NOTE — PROGRESS NOTE ADULT - ASSESSMENT
87 y.o male w/ pmhx a.fib, diastolic CHF (last echo noted 4/2015: LV 60%, severe pulm. htn, mod. 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   keep O> I    losartan 25 mg qd will increase dose as outpt   d/w primary team

## 2018-05-06 NOTE — PROGRESS NOTE ADULT - PROBLEM SELECTOR PLAN 4
The patient has been having proteinuria from before (old record in the chart from 6/13/17). DDx- Glomerular, Tubular, overflow or postrenal. SPEP is abnormal but keppa/Lamda ratio is normal.  - Appreciated Heme consult and plan, outpatient f/u at [facility] [place], might need BM biopsy to r/o MDS  - c/w losartan

## 2018-05-06 NOTE — PROGRESS NOTE ADULT - PROBLEM SELECTOR PLAN 1
Patient s/p PPM Friday by EP w/o complication.  Now V-paced and AFib on tele  - c/w Metoprolol 25mg BID  - c/w coumadin (INR 2-3), dose 3 mg tonight

## 2018-05-07 LAB
INR BLD: 1.81 RATIO — HIGH (ref 0.88–1.16)
PROTHROM AB SERPL-ACNC: 20 SEC — HIGH (ref 9.8–12.7)

## 2018-05-07 PROCEDURE — 99233 SBSQ HOSP IP/OBS HIGH 50: CPT

## 2018-05-07 RX ORDER — SOD,AMMONIUM,POTASSIUM LACTATE
1 CREAM (GRAM) TOPICAL
Qty: 0 | Refills: 0 | Status: DISCONTINUED | OUTPATIENT
Start: 2018-05-07 | End: 2018-05-08

## 2018-05-07 RX ORDER — LOSARTAN POTASSIUM 100 MG/1
25 TABLET, FILM COATED ORAL
Qty: 0 | Refills: 0 | Status: DISCONTINUED | OUTPATIENT
Start: 2018-05-07 | End: 2018-05-08

## 2018-05-07 RX ORDER — WARFARIN SODIUM 2.5 MG/1
5 TABLET ORAL ONCE
Qty: 0 | Refills: 0 | Status: COMPLETED | OUTPATIENT
Start: 2018-05-07 | End: 2018-05-07

## 2018-05-07 RX ADMIN — LOSARTAN POTASSIUM 25 MILLIGRAM(S): 100 TABLET, FILM COATED ORAL at 17:38

## 2018-05-07 RX ADMIN — FINASTERIDE 5 MILLIGRAM(S): 5 TABLET, FILM COATED ORAL at 12:04

## 2018-05-07 RX ADMIN — Medication 1 APPLICATION(S): at 17:36

## 2018-05-07 RX ADMIN — Medication 1 APPLICATION(S): at 21:50

## 2018-05-07 RX ADMIN — Medication 1 APPLICATION(S): at 05:56

## 2018-05-07 RX ADMIN — Medication 25 MILLIGRAM(S): at 05:54

## 2018-05-07 RX ADMIN — Medication 80 MILLIGRAM(S): at 05:53

## 2018-05-07 RX ADMIN — Medication 80 MILLIGRAM(S): at 17:36

## 2018-05-07 RX ADMIN — Medication 25 MILLIGRAM(S): at 17:36

## 2018-05-07 RX ADMIN — Medication 25 MILLIGRAM(S): at 05:53

## 2018-05-07 RX ADMIN — Medication 1 APPLICATION(S): at 14:33

## 2018-05-07 RX ADMIN — WARFARIN SODIUM 5 MILLIGRAM(S): 2.5 TABLET ORAL at 21:50

## 2018-05-07 RX ADMIN — Medication 25 MILLIGRAM(S): at 17:37

## 2018-05-07 NOTE — PROGRESS NOTE ADULT - ASSESSMENT
86 y/o man w/ PMH AFib, diastolic CHF (last echo noted 4/2018: LV 60%, severe pulm. htn, mod. 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

## 2018-05-07 NOTE — PROGRESS NOTE ADULT - PROBLEM SELECTOR PLAN 2
Not hypoxic, patient stable on RA and w/ ambulation  - Nephrology and cardiology recommendation noted  - C/W Lasix 80mg PO q12hrs today, Hydralazine 25mg BID,  Metoprolol 25mg bid.  Per d/w dr Ibarra, increase Losartan to 50 mg daily  - daily I/O and weight

## 2018-05-07 NOTE — PROGRESS NOTE ADULT - ASSESSMENT
87 y.o male w/ pmhx a.fib, diastolic CHF (last echo noted 4/2015: LV 60%, severe pulm. htn, mod. 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   keep O> I    losartan 25 mg bid. decrease hydralazine to 10 mg tid  may need additional metolazone 2.5 mg if increase fluid retention   d/w pt daughter at bedside regarding his renal issues.

## 2018-05-07 NOTE — PROGRESS NOTE ADULT - SUBJECTIVE AND OBJECTIVE BOX
Patient is a 87y old  Male who presents with a chief complaint of CP (27 Apr 2018 18:39)      SUBJECTIVE / OVERNIGHT EVENTS:  C/o itchy legs at night  no cp/sob/n/v    MEDICATIONS  (STANDING):  AQUAPHOR (petrolatum Ointment) 1 Application(s) Topical two times a day  docusate sodium 100 milliGRAM(s) Oral three times a day  finasteride 5 milliGRAM(s) Oral daily  furosemide    Tablet 80 milliGRAM(s) Oral two times a day  hydrALAZINE 25 milliGRAM(s) Oral every 12 hours  losartan 25 milliGRAM(s) Oral daily  metoprolol tartrate 25 milliGRAM(s) Oral two times a day  senna 2 Tablet(s) Oral at bedtime    MEDICATIONS  (PRN):  sodium chloride 0.65% Nasal 1 Spray(s) Both Nostrils every 2 hours PRN nasal dryness      Vital Signs Last 24 Hrs  T(C): 36.6 (07 May 2018 11:55), Max: 36.7 (06 May 2018 21:00)  T(F): 97.9 (07 May 2018 11:55), Max: 98.1 (06 May 2018 21:00)  HR: 71 (07 May 2018 11:55) (65 - 93)  BP: 121/58 (07 May 2018 11:55) (104/66 - 153/61)  BP(mean): --  RR: 18 (07 May 2018 11:55) (18 - 18)  SpO2: 96% (07 May 2018 11:55) (94% - 97%)  CAPILLARY BLOOD GLUCOSE        I&O's Summary    06 May 2018 07:01  -  07 May 2018 07:00  --------------------------------------------------------  IN: 1060 mL / OUT: 1150 mL / NET: -90 mL    07 May 2018 07:01  -  07 May 2018 12:24  --------------------------------------------------------  IN: 240 mL / OUT: 0 mL / NET: 240 mL        PHYSICAL EXAM:  GENERAL: NAD, well-developed  EYES: EOMI, PERRLA, conjunctiva and sclera clear  NECK: Supple, No JVD, no thyromegaly  CHEST/LUNG: Clear to auscultation bilaterally; No wheeze  HEART: irregular rate and rhythm; S1, S2 audible, No murmurs, rubs, or gallops  ABDOMEN: Soft, Nontender, Nondistended; Bowel sounds present  EXTREMITIES:  2+ Peripheral Pulses, No clubbing, cyanosis, 1+ edema  NEURO: AAOx3, no focal deficit  Skin: no rash b/l legs. Some excoriations from scratching    LABS:    05-06    143  |  104  |  78<H>  ----------------------------<  122<H>  3.8   |  25  |  2.80<H>    Ca    8.1<L>      06 May 2018 06:54      PT/INR - ( 07 May 2018 06:29 )   PT: 20.0 sec;   INR: 1.81 ratio         PTT - ( 06 May 2018 06:54 )  PTT:35.0 sec          RADIOLOGY & ADDITIONAL TESTS:    Imaging Personally Reviewed:    Consultant(s) Notes Reviewed:  renal    Care Discussed with Consultants/Other Providers:

## 2018-05-07 NOTE — PROGRESS NOTE ADULT - PROBLEM SELECTOR PLAN 4
The patient has been having proteinuria from before (old record in the chart from 6/13/17). DDx- Glomerular, Tubular, overflow or postrenal. SPEP is abnormal but keppa/Lamda ratio is normal.  - Appreciated Heme consult and plan, outpatient f/u at [facility], might need BM biopsy to r/o MDS  - c/w losartan

## 2018-05-07 NOTE — PROGRESS NOTE ADULT - SUBJECTIVE AND OBJECTIVE BOX
[facility]   [phone]  RENAL FOLLOW UP NOTE  --------------------------------------------------------------------------------  Chief Complaint:    24 hour events/subjective:    no new c/o     PAST HISTORY  --------------------------------------------------------------------------------  No significant changes to PMH, PSH, FHx, SHx, unless otherwise noted    ALLERGIES & MEDICATIONS  --------------------------------------------------------------------------------  Allergies    No Known Allergies    Intolerances      Standing Inpatient Medications  ammonium lactate 12% Lotion 1 Application(s) Topical two times a day  AQUAPHOR (petrolatum Ointment) 1 Application(s) Topical two times a day  docusate sodium 100 milliGRAM(s) Oral three times a day  finasteride 5 milliGRAM(s) Oral daily  furosemide    Tablet 80 milliGRAM(s) Oral two times a day  hydrALAZINE 25 milliGRAM(s) Oral every 12 hours  losartan 25 milliGRAM(s) Oral two times a day  metoprolol tartrate 25 milliGRAM(s) Oral two times a day  senna 2 Tablet(s) Oral at bedtime  warfarin 5 milliGRAM(s) Oral once    PRN Inpatient Medications  sodium chloride 0.65% Nasal 1 Spray(s) Both Nostrils every 2 hours PRN      REVIEW OF SYSTEMS  --------------------------------------------------------------------------------    Gen: denies  fevers/chills,  CVS: denies chest pain/palpitations  Resp: denies SOB/Cough  GI: Denies N/V/Abd pain  : Denies dysuria/oliguria/hematuria    All other systems were reviewed and are negative, except as noted.    VITALS/PHYSICAL EXAM  --------------------------------------------------------------------------------  T(C): 36.6 (05-07-18 @ 11:55), Max: 36.7 (05-06-18 @ 21:00)  HR: 71 (05-07-18 @ 11:55) (65 - 93)  BP: 121/58 (05-07-18 @ 11:55) (104/66 - 153/61)  RR: 18 (05-07-18 @ 11:55) (18 - 18)  SpO2: 96% (05-07-18 @ 11:55) (94% - 97%)  Wt(kg): --        05-06-18 @ 07:01  -  05-07-18 @ 07:00  --------------------------------------------------------  IN: 1060 mL / OUT: 1150 mL / NET: -90 mL    05-07-18 @ 07:01  -  05-07-18 @ 15:57  --------------------------------------------------------  IN: 600 mL / OUT: 0 mL / NET: 600 mL      Physical Exam:  	    Physical Exam:  	Gen: alert oriented place person and date   	Pulm: Decreased breath sounds b/l bases. no rales or ronchi or wheezing  	CV: RRR, S1/S2. no rub  	Back: No CVA tenderness; no sacral edema  	Abd: +BS, soft, nontender/nondistended  	: No suprapubic tenderness.               Extremity: No cyanosis, increasing edema no clubbing  	    LABS/STUDIES  --------------------------------------------------------------------------------    143  |  104  |  78  ----------------------------<  122      [05-06-18 @ 06:54]  3.8   |  25  |  2.80        Ca     8.1     [05-06-18 @ 06:54]      PT/INR: PT 20.0 , INR 1.81       [05-07-18 @ 06:29]  PTT: 35.0       [05-06-18 @ 06:54]      Creatinine Trend:  SCr 2.80 [05-06 @ 06:54]  SCr 2.92 [05-05 @ 06:25]  SCr 3.00 [05-04 @ 06:42]  SCr 2.89 [05-03 @ 06:39]  SCr 2.98 [05-02 @ 06:25]      Urinalysis - [04-24-18 @ 08:24]      Color Yellow / Appearance Clear / SG 1.012 / pH 5.5      Gluc Negative / Ketone Negative  / Bili Negative / Urobili Negative       Blood Negative / Protein 300 / Leuk Est Negative / Nitrite Negative      RBC 0-2 / WBC 0-2 / Hyaline  / Gran  / Sq Epi  / Non Sq Epi  / Bacteria Few      Iron 46, TIBC 180, %sat 26      [05-03-18 @ 11:09]  Ferritin 276      [05-03-18 @ 09:42]  HbA1c 5.5      [04-25-18 @ 07:44]  TSH 2.32      [04-25-18 @ 07:53]  Lipid: chol 135, , HDL 25, LDL 76      [04-25-18 @ 07:55]    PUSHPA: titer Negative, pattern --      [04-25-18 @ 08:03]  C3 Complement 86      [04-25-18 @ 08:03]  C4 Complement 28      [04-25-18 @ 08:03]  Rheumatoid Factor <7      [04-25-18 @ 07:55]  ANCA: cANCA Negative, pANCA Negative, atypical ANCA Negative      [04-26-18 @ 07:45]  MPO-ANCA <5.0, interpretation: Negative      [04-26-18 @ 07:45]  PR3-ANCA <5.0, interpretation: Negative      [04-26-18 @ 07:45]  Free Light Chains: kappa 9.05, lambda 5.76, ratio = 1.57      [04-27 @ 10:13]  Immunofixation Serum:   No Monoclonal Band Identified      [05-03-18 @ 08:25]  SPEP Interpretation: Normal Electrophoresis Pattern      [04-24-18 @ 19:22]  Immunofixation Urine: No Monoclonal Band Identified      [04-26-18 @ 13:36]  UPEP Interpretation: Mild Selective (Glomerular) Proteinuria      [04-26-18 @ 13:36]

## 2018-05-07 NOTE — PROGRESS NOTE ADULT - PROBLEM SELECTOR PLAN 1
Patient s/p PPM 5/4 by EP w/o complication.  Now V-paced and AFib on tele  - c/w Metoprolol 25mg BID  - c/w coumadin (INR 2-3), dose 5 mg tonight

## 2018-05-07 NOTE — PROGRESS NOTE ADULT - PROBLEM SELECTOR PLAN 3
Cr downtrending towards baseline.  - Case d/w renal (Dr. Ibarra).   serologies are neg for  c-anca/p-anca MPO and anti-proteinase 3 assay despite lung granulomas  hydralazine 25mg bid  suspect ckd IV/proteinuria due to atrophic kidney and parenchymal loss   lasix 80 mg po bid   cr steady .   keep O> I   increase losartan to 50 per d/w Dr Ibarra

## 2018-05-08 VITALS
SYSTOLIC BLOOD PRESSURE: 136 MMHG | RESPIRATION RATE: 19 BRPM | TEMPERATURE: 98 F | HEART RATE: 74 BPM | OXYGEN SATURATION: 95 % | DIASTOLIC BLOOD PRESSURE: 65 MMHG

## 2018-05-08 LAB
ANION GAP SERPL CALC-SCNC: 13 MMOL/L — SIGNIFICANT CHANGE UP (ref 5–17)
APTT BLD: 35.4 SEC — SIGNIFICANT CHANGE UP (ref 27.5–37.4)
BUN SERPL-MCNC: 84 MG/DL — HIGH (ref 7–23)
CALCIUM SERPL-MCNC: 8.4 MG/DL — SIGNIFICANT CHANGE UP (ref 8.4–10.5)
CHLORIDE SERPL-SCNC: 104 MMOL/L — SIGNIFICANT CHANGE UP (ref 96–108)
CO2 SERPL-SCNC: 25 MMOL/L — SIGNIFICANT CHANGE UP (ref 22–31)
CREAT SERPL-MCNC: 3.15 MG/DL — HIGH (ref 0.5–1.3)
GLUCOSE SERPL-MCNC: 101 MG/DL — HIGH (ref 70–99)
HCT VFR BLD CALC: 29.3 % — LOW (ref 39–50)
HGB BLD-MCNC: 9.7 G/DL — LOW (ref 13–17)
INR BLD: 1.75 RATIO — HIGH (ref 0.88–1.16)
MCHC RBC-ENTMCNC: 29.2 PG — SIGNIFICANT CHANGE UP (ref 27–34)
MCHC RBC-ENTMCNC: 33 GM/DL — SIGNIFICANT CHANGE UP (ref 32–36)
MCV RBC AUTO: 88.5 FL — SIGNIFICANT CHANGE UP (ref 80–100)
PLATELET # BLD AUTO: 288 K/UL — SIGNIFICANT CHANGE UP (ref 150–400)
POTASSIUM SERPL-MCNC: 4 MMOL/L — SIGNIFICANT CHANGE UP (ref 3.5–5.3)
POTASSIUM SERPL-SCNC: 4 MMOL/L — SIGNIFICANT CHANGE UP (ref 3.5–5.3)
PROTHROM AB SERPL-ACNC: 19.1 SEC — HIGH (ref 9.8–12.7)
RBC # BLD: 3.31 M/UL — LOW (ref 4.2–5.8)
RBC # FLD: 15.6 % — HIGH (ref 10.3–14.5)
SODIUM SERPL-SCNC: 142 MMOL/L — SIGNIFICANT CHANGE UP (ref 135–145)
WBC # BLD: 6.7 K/UL — SIGNIFICANT CHANGE UP (ref 3.8–10.5)
WBC # FLD AUTO: 6.7 K/UL — SIGNIFICANT CHANGE UP (ref 3.8–10.5)

## 2018-05-08 PROCEDURE — 82553 CREATINE MB FRACTION: CPT

## 2018-05-08 PROCEDURE — 85610 PROTHROMBIN TIME: CPT

## 2018-05-08 PROCEDURE — A9567: CPT

## 2018-05-08 PROCEDURE — 80053 COMPREHEN METABOLIC PANEL: CPT

## 2018-05-08 PROCEDURE — C1894: CPT

## 2018-05-08 PROCEDURE — 86901 BLOOD TYPING SEROLOGIC RH(D): CPT

## 2018-05-08 PROCEDURE — 71045 X-RAY EXAM CHEST 1 VIEW: CPT

## 2018-05-08 PROCEDURE — 82746 ASSAY OF FOLIC ACID SERUM: CPT

## 2018-05-08 PROCEDURE — 83036 HEMOGLOBIN GLYCOSYLATED A1C: CPT

## 2018-05-08 PROCEDURE — 85014 HEMATOCRIT: CPT

## 2018-05-08 PROCEDURE — 80074 ACUTE HEPATITIS PANEL: CPT

## 2018-05-08 PROCEDURE — 87798 DETECT AGENT NOS DNA AMP: CPT

## 2018-05-08 PROCEDURE — 84540 ASSAY OF URINE/UREA-N: CPT

## 2018-05-08 PROCEDURE — 86900 BLOOD TYPING SEROLOGIC ABO: CPT

## 2018-05-08 PROCEDURE — 86431 RHEUMATOID FACTOR QUANT: CPT

## 2018-05-08 PROCEDURE — 82784 ASSAY IGA/IGD/IGG/IGM EACH: CPT

## 2018-05-08 PROCEDURE — 84100 ASSAY OF PHOSPHORUS: CPT

## 2018-05-08 PROCEDURE — 83935 ASSAY OF URINE OSMOLALITY: CPT

## 2018-05-08 PROCEDURE — 85045 AUTOMATED RETICULOCYTE COUNT: CPT

## 2018-05-08 PROCEDURE — 82668 ASSAY OF ERYTHROPOIETIN: CPT

## 2018-05-08 PROCEDURE — 83521 IG LIGHT CHAINS FREE EACH: CPT

## 2018-05-08 PROCEDURE — 83880 ASSAY OF NATRIURETIC PEPTIDE: CPT

## 2018-05-08 PROCEDURE — 85027 COMPLETE CBC AUTOMATED: CPT

## 2018-05-08 PROCEDURE — 80061 LIPID PANEL: CPT

## 2018-05-08 PROCEDURE — 82607 VITAMIN B-12: CPT

## 2018-05-08 PROCEDURE — 82435 ASSAY OF BLOOD CHLORIDE: CPT

## 2018-05-08 PROCEDURE — 83010 ASSAY OF HAPTOGLOBIN QUANT: CPT

## 2018-05-08 PROCEDURE — 93005 ELECTROCARDIOGRAM TRACING: CPT

## 2018-05-08 PROCEDURE — 97110 THERAPEUTIC EXERCISES: CPT

## 2018-05-08 PROCEDURE — 86850 RBC ANTIBODY SCREEN: CPT

## 2018-05-08 PROCEDURE — 85652 RBC SED RATE AUTOMATED: CPT

## 2018-05-08 PROCEDURE — 84166 PROTEIN E-PHORESIS/URINE/CSF: CPT

## 2018-05-08 PROCEDURE — 71250 CT THORAX DX C-: CPT

## 2018-05-08 PROCEDURE — 94660 CPAP INITIATION&MGMT: CPT

## 2018-05-08 PROCEDURE — 84484 ASSAY OF TROPONIN QUANT: CPT

## 2018-05-08 PROCEDURE — 96375 TX/PRO/DX INJ NEW DRUG ADDON: CPT

## 2018-05-08 PROCEDURE — C1786: CPT

## 2018-05-08 PROCEDURE — 76770 US EXAM ABDO BACK WALL COMP: CPT

## 2018-05-08 PROCEDURE — 81001 URINALYSIS AUTO W/SCOPE: CPT

## 2018-05-08 PROCEDURE — 82550 ASSAY OF CK (CPK): CPT

## 2018-05-08 PROCEDURE — 83605 ASSAY OF LACTIC ACID: CPT

## 2018-05-08 PROCEDURE — 99232 SBSQ HOSP IP/OBS MODERATE 35: CPT

## 2018-05-08 PROCEDURE — 80048 BASIC METABOLIC PNL TOTAL CA: CPT

## 2018-05-08 PROCEDURE — 84300 ASSAY OF URINE SODIUM: CPT

## 2018-05-08 PROCEDURE — 82330 ASSAY OF CALCIUM: CPT

## 2018-05-08 PROCEDURE — 93306 TTE W/DOPPLER COMPLETE: CPT

## 2018-05-08 PROCEDURE — 82803 BLOOD GASES ANY COMBINATION: CPT

## 2018-05-08 PROCEDURE — 99291 CRITICAL CARE FIRST HOUR: CPT | Mod: 25

## 2018-05-08 PROCEDURE — 83615 LACTATE (LD) (LDH) ENZYME: CPT

## 2018-05-08 PROCEDURE — 87581 M.PNEUMON DNA AMP PROBE: CPT

## 2018-05-08 PROCEDURE — 84295 ASSAY OF SERUM SODIUM: CPT

## 2018-05-08 PROCEDURE — 0387T: CPT

## 2018-05-08 PROCEDURE — 86036 ANCA SCREEN EACH ANTIBODY: CPT

## 2018-05-08 PROCEDURE — 99239 HOSP IP/OBS DSCHRG MGMT >30: CPT

## 2018-05-08 PROCEDURE — 97162 PT EVAL MOD COMPLEX 30 MIN: CPT

## 2018-05-08 PROCEDURE — 83735 ASSAY OF MAGNESIUM: CPT

## 2018-05-08 PROCEDURE — 84156 ASSAY OF PROTEIN URINE: CPT

## 2018-05-08 PROCEDURE — 87633 RESP VIRUS 12-25 TARGETS: CPT

## 2018-05-08 PROCEDURE — 86038 ANTINUCLEAR ANTIBODIES: CPT

## 2018-05-08 PROCEDURE — 85730 THROMBOPLASTIN TIME PARTIAL: CPT

## 2018-05-08 PROCEDURE — 83930 ASSAY OF BLOOD OSMOLALITY: CPT

## 2018-05-08 PROCEDURE — 86160 COMPLEMENT ANTIGEN: CPT

## 2018-05-08 PROCEDURE — 82728 ASSAY OF FERRITIN: CPT

## 2018-05-08 PROCEDURE — 83516 IMMUNOASSAY NONANTIBODY: CPT

## 2018-05-08 PROCEDURE — 97530 THERAPEUTIC ACTIVITIES: CPT

## 2018-05-08 PROCEDURE — 78582 LUNG VENTILAT&PERFUS IMAGING: CPT

## 2018-05-08 PROCEDURE — 84155 ASSAY OF PROTEIN SERUM: CPT

## 2018-05-08 PROCEDURE — 84443 ASSAY THYROID STIM HORMONE: CPT

## 2018-05-08 PROCEDURE — 83550 IRON BINDING TEST: CPT

## 2018-05-08 PROCEDURE — 87486 CHLMYD PNEUM DNA AMP PROBE: CPT

## 2018-05-08 PROCEDURE — 84132 ASSAY OF SERUM POTASSIUM: CPT

## 2018-05-08 PROCEDURE — 97116 GAIT TRAINING THERAPY: CPT

## 2018-05-08 PROCEDURE — C1769: CPT

## 2018-05-08 PROCEDURE — 82947 ASSAY GLUCOSE BLOOD QUANT: CPT

## 2018-05-08 PROCEDURE — 96374 THER/PROPH/DIAG INJ IV PUSH: CPT

## 2018-05-08 PROCEDURE — 86334 IMMUNOFIX E-PHORESIS SERUM: CPT

## 2018-05-08 PROCEDURE — 84165 PROTEIN E-PHORESIS SERUM: CPT

## 2018-05-08 PROCEDURE — A9540: CPT

## 2018-05-08 RX ORDER — HYDRALAZINE HCL 50 MG
10 TABLET ORAL EVERY 8 HOURS
Qty: 0 | Refills: 0 | Status: DISCONTINUED | OUTPATIENT
Start: 2018-05-08 | End: 2018-05-08

## 2018-05-08 RX ORDER — HYDRALAZINE HCL 50 MG
1 TABLET ORAL
Qty: 90 | Refills: 0
Start: 2018-05-08 | End: 2018-06-06

## 2018-05-08 RX ORDER — SOD,AMMONIUM,POTASSIUM LACTATE
1 CREAM (GRAM) TOPICAL
Qty: 1 | Refills: 0
Start: 2018-05-08 | End: 2018-05-17

## 2018-05-08 RX ORDER — LOSARTAN POTASSIUM 100 MG/1
25 TABLET, FILM COATED ORAL DAILY
Qty: 0 | Refills: 0 | Status: DISCONTINUED | OUTPATIENT
Start: 2018-05-09 | End: 2018-05-08

## 2018-05-08 RX ORDER — WARFARIN SODIUM 2.5 MG/1
0 TABLET ORAL
Qty: 0 | Refills: 0 | COMMUNITY

## 2018-05-08 RX ORDER — WARFARIN SODIUM 2.5 MG/1
5 TABLET ORAL ONCE
Qty: 0 | Refills: 0 | Status: COMPLETED | OUTPATIENT
Start: 2018-05-08 | End: 2018-05-08

## 2018-05-08 RX ADMIN — Medication 25 MILLIGRAM(S): at 17:22

## 2018-05-08 RX ADMIN — FINASTERIDE 5 MILLIGRAM(S): 5 TABLET, FILM COATED ORAL at 12:52

## 2018-05-08 RX ADMIN — Medication 25 MILLIGRAM(S): at 06:09

## 2018-05-08 RX ADMIN — Medication 100 MILLIGRAM(S): at 06:09

## 2018-05-08 RX ADMIN — LOSARTAN POTASSIUM 25 MILLIGRAM(S): 100 TABLET, FILM COATED ORAL at 06:09

## 2018-05-08 RX ADMIN — Medication 1 APPLICATION(S): at 06:09

## 2018-05-08 RX ADMIN — Medication 80 MILLIGRAM(S): at 06:08

## 2018-05-08 RX ADMIN — Medication 1 APPLICATION(S): at 12:50

## 2018-05-08 RX ADMIN — Medication 25 MILLIGRAM(S): at 06:08

## 2018-05-08 RX ADMIN — Medication 10 MILLIGRAM(S): at 14:14

## 2018-05-08 RX ADMIN — WARFARIN SODIUM 5 MILLIGRAM(S): 2.5 TABLET ORAL at 17:23

## 2018-05-08 NOTE — PROGRESS NOTE ADULT - ASSESSMENT
87 M with AF on A/C, mod AS on last echo presents with chest pain atypical and shortness of breath likely acute on chronic HFpEF  ·	Patient states he feels much improved since the PPM.   ·	Continue present cardiac medications.  ·	Dr. Ibarra input greatly appreciated. ARB started for proteuria and suspected nephrotic syndrome  ·	Continue diuresis.     =======================================================================  Hermelindo Magana MD [facility]    Please page [phone] with questions  On nights and weekend please call the office [phone].

## 2018-05-08 NOTE — PROGRESS NOTE ADULT - PROBLEM SELECTOR PROBLEM 3
Hypertensive urgency
Acute kidney injury superimposed on CKD
Proteinuria
Acute kidney injury superimposed on CKD
Proteinuria
Hypertensive urgency

## 2018-05-08 NOTE — PROGRESS NOTE ADULT - SUBJECTIVE AND OBJECTIVE BOX
Cardiology Follow Up  ==========================================  CC:     HPI:    Review Of Systems:  Negative except as documented above    Medications:  ammonium lactate 12% Lotion   1 Application(s) Topical (05-08-18 @ 12:50)   1 Application(s) Topical (05-07-18 @ 21:50)   1 Application(s) Topical (05-07-18 @ 14:33)    AQUAPHOR (petrolatum Ointment)   1 Application(s) Topical (05-08-18 @ 06:09)   1 Application(s) Topical (05-07-18 @ 17:36)    docusate sodium   100 milliGRAM(s) Oral (05-08-18 @ 06:09)    finasteride   5 milliGRAM(s) Oral (05-08-18 @ 12:52)    furosemide    Tablet   80 milliGRAM(s) Oral (05-08-18 @ 06:08)   80 milliGRAM(s) Oral (05-07-18 @ 17:36)    hydrALAZINE   25 milliGRAM(s) Oral (05-08-18 @ 06:08)   25 milliGRAM(s) Oral (05-07-18 @ 17:36)    losartan   25 milliGRAM(s) Oral (05-08-18 @ 06:09)   25 milliGRAM(s) Oral (05-07-18 @ 17:38)    metoprolol tartrate   25 milliGRAM(s) Oral (05-08-18 @ 06:09)   25 milliGRAM(s) Oral (05-07-18 @ 17:37)    warfarin   5 milliGRAM(s) Oral (05-07-18 @ 21:50)        Telemetry:     Vital Signs Last 24 Hrs  T(C): 36.4 (08 May 2018 12:35), Max: 36.8 (07 May 2018 21:44)  T(F): 97.6 (08 May 2018 12:35), Max: 98.3 (07 May 2018 21:44)  HR: 74 (08 May 2018 12:35) (68 - 74)  BP: 136/65 (08 May 2018 12:35) (117/55 - 136/65)  BP(mean): --  RR: 19 (08 May 2018 12:35) (18 - 19)  SpO2: 95% (08 May 2018 12:35) (92% - 95%)  I&O's Summary    07 May 2018 07:01  -  08 May 2018 07:00  --------------------------------------------------------  IN: 600 mL / OUT: 0 mL / NET: 600 mL    08 May 2018 07:01  -  08 May 2018 13:47  --------------------------------------------------------  IN: 360 mL / OUT: 0 mL / NET: 360 mL        Physical Exam:  General: [x ] NAD  HEENT: [x ] EOMI [ x] PERRL  Cor: [x ] S1,S2 [x ] RRR [x ] No M/R/G   Lungs: [x ] CTA B/L [ x] Normal effort  Abd: [x ] Soft [x ] Non-tender [x ] +BS  Ext: [ x] No edema [x ] No cyanosis    Labs:                        9.7    6.7   )-----------( 288      ( 08 May 2018 06:54 )             29.3     05-08    142  |  104  |  84<H>  ----------------------------<  101<H>  4.0   |  25  |  3.15<H>    Ca    8.4      08 May 2018 06:52 Cardiology Follow Up  ==========================================  CC: HFpEF, Chest pain, AF tachy rosa.     HPI: Patient states he feels much better since the PPM was placed. No complaints except LE edema.     Review Of Systems:  Negative except as documented above    Medications:  ammonium lactate 12% Lotion   1 Application(s) Topical (05-08-18 @ 12:50)   1 Application(s) Topical (05-07-18 @ 21:50)   1 Application(s) Topical (05-07-18 @ 14:33)    AQUAPHOR (petrolatum Ointment)   1 Application(s) Topical (05-08-18 @ 06:09)   1 Application(s) Topical (05-07-18 @ 17:36)    docusate sodium   100 milliGRAM(s) Oral (05-08-18 @ 06:09)    finasteride   5 milliGRAM(s) Oral (05-08-18 @ 12:52)    furosemide    Tablet   80 milliGRAM(s) Oral (05-08-18 @ 06:08)   80 milliGRAM(s) Oral (05-07-18 @ 17:36)    hydrALAZINE   25 milliGRAM(s) Oral (05-08-18 @ 06:08)   25 milliGRAM(s) Oral (05-07-18 @ 17:36)    losartan   25 milliGRAM(s) Oral (05-08-18 @ 06:09)   25 milliGRAM(s) Oral (05-07-18 @ 17:38)    metoprolol tartrate   25 milliGRAM(s) Oral (05-08-18 @ 06:09)   25 milliGRAM(s) Oral (05-07-18 @ 17:37)    warfarin   5 milliGRAM(s) Oral (05-07-18 @ 21:50)        Telemetry: V-pace, AF    Vital Signs Last 24 Hrs  T(C): 36.4 (08 May 2018 12:35), Max: 36.8 (07 May 2018 21:44)  T(F): 97.6 (08 May 2018 12:35), Max: 98.3 (07 May 2018 21:44)  HR: 74 (08 May 2018 12:35) (68 - 74)  BP: 136/65 (08 May 2018 12:35) (117/55 - 136/65)  BP(mean): --  RR: 19 (08 May 2018 12:35) (18 - 19)  SpO2: 95% (08 May 2018 12:35) (92% - 95%)  I&O's Summary    07 May 2018 07:01  -  08 May 2018 07:00  --------------------------------------------------------  IN: 600 mL / OUT: 0 mL / NET: 600 mL    08 May 2018 07:01  -  08 May 2018 13:47  --------------------------------------------------------  IN: 360 mL / OUT: 0 mL / NET: 360 mL        Physical Exam:  General: [x ] NAD  HEENT: [x ] EOMI [ x] PERRL  Cor: [x ] S1,S2 [x ] RRR [x ] No M/R/G   Lungs: [x ] CTA B/L [ x] Normal effort  Abd: [x ] Soft [x ] Non-tender [x ] +BS  Ext: [ x] B/l LE edema. [x ] No cyanosis    Labs:                        9.7    6.7   )-----------( 288      ( 08 May 2018 06:54 )             29.3     05-08    142  |  104  |  84<H>  ----------------------------<  101<H>  4.0   |  25  |  3.15<H>    Ca    8.4      08 May 2018 06:52

## 2018-05-08 NOTE — PROGRESS NOTE ADULT - PROBLEM SELECTOR PLAN 2
Not hypoxic, patient stable on RA and w/ ambulation  - Nephrology and cardiology recommendation noted  - C/W Lasix 80mg PO q12hrs today, Hydralazine 10mg TID,  Metoprolol 25mg bid.  Per d/w dr Ibarra, Losartan now 25mg twice daily  - daily I/O and weight  -consider metolazone as outpatient

## 2018-05-08 NOTE — PROGRESS NOTE ADULT - PROBLEM SELECTOR PLAN 3
Cr increased today likely from losartan increase  -f/u with nephrology Dr Ibarra  serologies are neg for  c-anca/p-anca MPO and anti-proteinase 3 assay despite lung granulomas  hydralazine 10mg tid  suspect ckd IV/proteinuria due to atrophic kidney and parenchymal loss   lasix 80 mg po bid   keep O> I

## 2018-05-08 NOTE — PROGRESS NOTE ADULT - ASSESSMENT
86 y/o man w/ PMH AFib, diastolic CHF (last echo noted 4/2018: LV 60%, severe pulm. htn, mod. 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

## 2018-05-08 NOTE — PROGRESS NOTE ADULT - PROBLEM SELECTOR PLAN 4
The patient has been having proteinuria from before (old record in the chart from 6/13/17). DDx- Glomerular, Tubular, overflow or postrenal. SPEP is abnormal but keppa/Lamda ratio is normal.  - Appreciated Heme consult and plan, outpatient f/u at [facility], might need BM biopsy to r/o MDS  - c/w losartan

## 2018-05-08 NOTE — PROGRESS NOTE ADULT - PROBLEM SELECTOR PROBLEM 6
Acute urinary retention
Diabetes
Opacity of lung on imaging study
Opacity of lung on imaging study
Acute urinary retention
Diabetes
Acute urinary retention

## 2018-05-08 NOTE — PROGRESS NOTE ADULT - PROBLEM SELECTOR PROBLEM 2
Acute respiratory failure with hypoxia
Acute on chronic diastolic congestive heart failure
Acute kidney injury superimposed on CKD
Acute on chronic diastolic congestive heart failure
Bradycardia
Acute on chronic diastolic congestive heart failure
Acute on chronic diastolic congestive heart failure
Acute kidney injury superimposed on CKD
Acute respiratory failure with hypoxia
Bradycardia

## 2018-05-08 NOTE — PROGRESS NOTE ADULT - PROBLEM SELECTOR PROBLEM 1
Acute on chronic diastolic congestive heart failure
Atrial fibrillation with slow ventricular response
Acute on chronic diastolic congestive heart failure
Atrial fibrillation with slow ventricular response
Tachy-rosa syndrome
Acute on chronic diastolic congestive heart failure
Atrial fibrillation with slow ventricular response
Atrial fibrillation with slow ventricular response
Acute on chronic diastolic congestive heart failure

## 2018-05-08 NOTE — PROGRESS NOTE ADULT - NSHPATTENDINGPLANDISCUSS_GEN_ALL_CORE
Card, Renal and daughter- Leslie
Card, Renal
NP Joana
NP chet
Cardiology, Renal and Dtr- Leslie
Card, Renal and daughter- Leslie
Daughter-Abundio Nelson, Renal

## 2018-05-08 NOTE — PROGRESS NOTE ADULT - ATTENDING COMMENTS
seen and examined with NP. I agree with H & P, A & P.  d/w pt and family role of PPM to resume essential medical therapy.  Will proceed with Makenzie 5/4/2018
Beeper: [phone]    discharge time: 40min    awaiting decision from insurance for [employer] coverage.  If not covered, plan for d/c home with home services
Beeper: [phone]    patient and family requesting applying to rehab. Pt ambulated 200ft.  may benefit from [facility] but recommended for home pt.  cm to apply to [facility]
PT reeval appreciated.  Patient initially recommended for home w/ PT.  However, patient adamantly refuses to allow anyone in the house (he lives alone w/ no assistance).  Patient not safe to return home alone at this time, daughter would like patient to go to rehab (he is agreeable).  PT to reevalutate for rehab.  Case d/w patient and his daughter (Leslie) at bedside.  d/w NP (Rosalina) and nephrology (Dr. Ibarra).     Bobo Santos M.D.  Hospitalist  Pager: [phone]
Plan d/w patient and his daughter ([facility] [phone]).  d/w EP (Josie).    Patient improving medically, but some concern for decontioning (last eval by PT 5 days ago recommended outpt PT, but patient missed eval yesterday while at procedure).  He lives alone and ambulates with walker; daughter is concerned about discharge safety.  PT to reeval tomorrow AM, d/w NP (Rosalina).    Bobo Santos M.D.  Hospitalist  Pager: [phone]

## 2018-05-08 NOTE — PROGRESS NOTE ADULT - PROBLEM SELECTOR PROBLEM 7
Diabetes
Diabetes
HTN (hypertension)
Hyperglycemia
Hyperglycemia
Diabetes
Diabetes
HTN (hypertension)
Diabetes

## 2018-05-08 NOTE — PROGRESS NOTE ADULT - PROVIDER SPECIALTY LIST ADULT
Cardiology
Electrophysiology
Hospitalist
Internal Medicine
Nephrology
Cardiology
Nephrology
Nephrology
Hospitalist
Internal Medicine
Internal Medicine
Hospitalist
Internal Medicine

## 2018-05-08 NOTE — PROGRESS NOTE ADULT - SUBJECTIVE AND OBJECTIVE BOX
Patient is a 87y old  Male who presents with a chief complaint of CP (27 Apr 2018 18:39)      SUBJECTIVE / OVERNIGHT EVENTS:  feels well  no acute events/pain  no cp/sob/n/v    MEDICATIONS  (STANDING):  ammonium lactate 12% Lotion 1 Application(s) Topical two times a day  AQUAPHOR (petrolatum Ointment) 1 Application(s) Topical two times a day  docusate sodium 100 milliGRAM(s) Oral three times a day  finasteride 5 milliGRAM(s) Oral daily  furosemide    Tablet 80 milliGRAM(s) Oral two times a day  hydrALAZINE 10 milliGRAM(s) Oral every 8 hours  losartan 25 milliGRAM(s) Oral two times a day  metoprolol tartrate 25 milliGRAM(s) Oral two times a day  senna 2 Tablet(s) Oral at bedtime  warfarin 5 milliGRAM(s) Oral once    MEDICATIONS  (PRN):  sodium chloride 0.65% Nasal 1 Spray(s) Both Nostrils every 2 hours PRN nasal dryness      Vital Signs Last 24 Hrs  T(C): 36.5 (08 May 2018 04:13), Max: 36.8 (07 May 2018 21:44)  T(F): 97.7 (08 May 2018 04:13), Max: 98.3 (07 May 2018 21:44)  HR: 68 (08 May 2018 04:13) (68 - 71)  BP: 117/55 (08 May 2018 04:13) (117/55 - 121/70)  BP(mean): --  RR: 18 (08 May 2018 04:13) (18 - 18)  SpO2: 92% (08 May 2018 04:13) (92% - 95%)  CAPILLARY BLOOD GLUCOSE        I&O's Summary    07 May 2018 07:01  -  08 May 2018 07:00  --------------------------------------------------------  IN: 600 mL / OUT: 0 mL / NET: 600 mL        PHYSICAL EXAM:  GENERAL: NAD, well-developed  EYES: EOMI, PERRLA, conjunctiva and sclera clear  CHEST/LUNG: Clear to auscultation bilaterally; No wheeze  HEART: irregular rate and rhythm; S1, S2 audible, No murmurs, rubs, or gallops  ABDOMEN: Soft, Nontender, Nondistended; Bowel sounds present  EXTREMITIES:  2+ Peripheral Pulses, No clubbing, cyanosis, 1+ edema b/l  NEURO: AAOx3, no focal deficit  Skin: no rash b/l legs. Some excoriations from scratching    LABS:                        9.7    6.7   )-----------( 288      ( 08 May 2018 06:54 )             29.3     05-08    142  |  104  |  84<H>  ----------------------------<  101<H>  4.0   |  25  |  3.15<H>    Ca    8.4      08 May 2018 06:52      PT/INR - ( 08 May 2018 06:54 )   PT: 19.1 sec;   INR: 1.75 ratio         PTT - ( 08 May 2018 06:54 )  PTT:35.4 sec          RADIOLOGY & ADDITIONAL TESTS:    Imaging Personally Reviewed:    Consultant(s) Notes Reviewed:  nephrology    Care Discussed with Consultants/Other Providers:

## 2018-05-08 NOTE — PROGRESS NOTE ADULT - PROBLEM SELECTOR PROBLEM 4
Acute kidney injury superimposed on CKD
Proteinuria
Proteinuria
Atrial fibrillation with RVR
Proteinuria
Atrial fibrillation with RVR
Acute kidney injury superimposed on CKD

## 2018-05-08 NOTE — PROGRESS NOTE ADULT - PROBLEM SELECTOR PROBLEM 5
Acute urinary retention
Atrial fibrillation, chronic
Atrial fibrillation, chronic
Atrial fibrillation with RVR
Acute urinary retention
Atrial fibrillation with RVR
Acute urinary retention
Acute urinary retention

## 2018-05-08 NOTE — PROGRESS NOTE ADULT - PROBLEM SELECTOR PLAN 1
Patient s/p PPM 5/4 by EP w/o complication.  Now V-paced and AFib on tele  - c/w Metoprolol 25mg BID  - c/w coumadin (INR 2-3), dose 5 mg tonight for subtherapeutic INR

## 2018-05-09 RX ORDER — WARFARIN SODIUM 2.5 MG/1
1 TABLET ORAL
Qty: 0 | Refills: 0 | DISCHARGE
Start: 2018-05-09

## 2018-05-16 ENCOUNTER — NON-APPOINTMENT (OUTPATIENT)
Age: 83
End: 2018-05-16

## 2018-05-16 ENCOUNTER — APPOINTMENT (OUTPATIENT)
Dept: ELECTROPHYSIOLOGY | Facility: CLINIC | Age: 83
End: 2018-05-16
Payer: MEDICARE

## 2018-05-16 VITALS
BODY MASS INDEX: 24.62 KG/M2 | DIASTOLIC BLOOD PRESSURE: 78 MMHG | OXYGEN SATURATION: 95 % | WEIGHT: 172 LBS | HEIGHT: 70 IN | HEART RATE: 63 BPM | SYSTOLIC BLOOD PRESSURE: 149 MMHG

## 2018-05-16 PROCEDURE — 99024 POSTOP FOLLOW-UP VISIT: CPT

## 2018-05-31 ENCOUNTER — APPOINTMENT (OUTPATIENT)
Dept: ELECTROPHYSIOLOGY | Facility: CLINIC | Age: 83
End: 2018-05-31
Payer: MEDICARE

## 2018-05-31 VITALS
DIASTOLIC BLOOD PRESSURE: 66 MMHG | OXYGEN SATURATION: 100 % | SYSTOLIC BLOOD PRESSURE: 133 MMHG | HEIGHT: 70 IN | WEIGHT: 172 LBS | HEART RATE: 51 BPM | BODY MASS INDEX: 24.62 KG/M2

## 2018-05-31 PROCEDURE — 99024 POSTOP FOLLOW-UP VISIT: CPT

## 2018-06-07 ENCOUNTER — APPOINTMENT (OUTPATIENT)
Dept: ELECTROPHYSIOLOGY | Facility: CLINIC | Age: 83
End: 2018-06-07
Payer: MEDICARE

## 2018-06-07 VITALS
DIASTOLIC BLOOD PRESSURE: 69 MMHG | OXYGEN SATURATION: 98 % | HEIGHT: 70 IN | HEART RATE: 53 BPM | BODY MASS INDEX: 24.62 KG/M2 | WEIGHT: 172 LBS | SYSTOLIC BLOOD PRESSURE: 163 MMHG

## 2018-06-07 PROCEDURE — 99024 POSTOP FOLLOW-UP VISIT: CPT

## 2018-07-31 ENCOUNTER — APPOINTMENT (OUTPATIENT)
Dept: CARDIOLOGY | Facility: CLINIC | Age: 83
End: 2018-07-31
Payer: MEDICARE

## 2018-07-31 VITALS
OXYGEN SATURATION: 100 % | SYSTOLIC BLOOD PRESSURE: 144 MMHG | HEART RATE: 57 BPM | DIASTOLIC BLOOD PRESSURE: 63 MMHG | WEIGHT: 172 LBS | HEIGHT: 70 IN | BODY MASS INDEX: 24.62 KG/M2

## 2018-07-31 DIAGNOSIS — N18.9 CHRONIC KIDNEY DISEASE, UNSPECIFIED: ICD-10-CM

## 2018-07-31 DIAGNOSIS — M17.10 UNILATERAL PRIMARY OSTEOARTHRITIS, UNSPECIFIED KNEE: ICD-10-CM

## 2018-07-31 DIAGNOSIS — R53.83 OTHER FATIGUE: ICD-10-CM

## 2018-07-31 DIAGNOSIS — B35.3 TINEA PEDIS: ICD-10-CM

## 2018-07-31 DIAGNOSIS — E11.9 TYPE 2 DIABETES MELLITUS W/OUT COMPLICATIONS: ICD-10-CM

## 2018-07-31 DIAGNOSIS — Z23 ENCOUNTER FOR IMMUNIZATION: ICD-10-CM

## 2018-07-31 DIAGNOSIS — R01.1 CARDIAC MURMUR, UNSPECIFIED: ICD-10-CM

## 2018-07-31 DIAGNOSIS — I10 ESSENTIAL (PRIMARY) HYPERTENSION: ICD-10-CM

## 2018-07-31 DIAGNOSIS — I27.20 PULMONARY HYPERTENSION, UNSPECIFIED: ICD-10-CM

## 2018-07-31 DIAGNOSIS — R06.00 DYSPNEA, UNSPECIFIED: ICD-10-CM

## 2018-07-31 DIAGNOSIS — R60.0 LOCALIZED EDEMA: ICD-10-CM

## 2018-07-31 DIAGNOSIS — T14.8XXD OTHER INJURY OF UNSPECIFIED BODY REGION, SUBSEQUENT ENCOUNTER: ICD-10-CM

## 2018-07-31 DIAGNOSIS — R00.1 BRADYCARDIA, UNSPECIFIED: ICD-10-CM

## 2018-07-31 PROCEDURE — 99215 OFFICE O/P EST HI 40 MIN: CPT

## 2018-07-31 PROCEDURE — 93000 ELECTROCARDIOGRAM COMPLETE: CPT

## 2018-07-31 RX ORDER — CEPHALEXIN 500 MG/1
500 CAPSULE ORAL
Qty: 10 | Refills: 0 | Status: DISCONTINUED | COMMUNITY
Start: 2018-05-16 | End: 2018-07-31

## 2018-07-31 RX ORDER — FINASTERIDE 5 MG/1
5 TABLET, FILM COATED ORAL
Qty: 90 | Refills: 3 | Status: ACTIVE | COMMUNITY

## 2018-08-14 PROBLEM — T14.8XXD WOUND HEALING, DELAYED: Status: ACTIVE | Noted: 2018-05-16

## 2018-09-11 NOTE — DISCHARGE NOTE ADULT - NSFTFATTESTRESTRICTRD_GEN_ALL_CORE
Addendum  created 09/11/18 0831 by Poncho Chen CRNA    Anesthesia Intra Flowsheets edited      
ATTENDING CERTIFICATION

## 2018-09-26 ENCOUNTER — APPOINTMENT (OUTPATIENT)
Dept: ELECTROPHYSIOLOGY | Facility: CLINIC | Age: 83
End: 2018-09-26

## 2018-10-16 ENCOUNTER — APPOINTMENT (OUTPATIENT)
Dept: CARDIOLOGY | Facility: CLINIC | Age: 83
End: 2018-10-16

## 2018-11-12 ENCOUNTER — APPOINTMENT (OUTPATIENT)
Dept: ELECTROPHYSIOLOGY | Facility: CLINIC | Age: 83
End: 2018-11-12

## 2018-11-26 ENCOUNTER — LABORATORY RESULT (OUTPATIENT)
Age: 83
End: 2018-11-26

## 2018-11-27 ENCOUNTER — APPOINTMENT (OUTPATIENT)
Dept: CARDIOLOGY | Facility: CLINIC | Age: 83
End: 2018-11-27
Payer: MEDICARE

## 2018-11-27 ENCOUNTER — NON-APPOINTMENT (OUTPATIENT)
Age: 83
End: 2018-11-27

## 2018-11-27 VITALS
HEIGHT: 70 IN | BODY MASS INDEX: 22.19 KG/M2 | OXYGEN SATURATION: 100 % | DIASTOLIC BLOOD PRESSURE: 70 MMHG | SYSTOLIC BLOOD PRESSURE: 144 MMHG | WEIGHT: 155 LBS | HEART RATE: 78 BPM

## 2018-11-27 DIAGNOSIS — I50.9 HEART FAILURE, UNSPECIFIED: ICD-10-CM

## 2018-11-27 LAB — INR PPP: 1.7 RATIO

## 2018-11-27 PROCEDURE — 99215 OFFICE O/P EST HI 40 MIN: CPT

## 2018-11-27 PROCEDURE — 85610 PROTHROMBIN TIME: CPT | Mod: QW

## 2018-11-27 PROCEDURE — 93000 ELECTROCARDIOGRAM COMPLETE: CPT

## 2018-11-27 PROCEDURE — 36415 COLL VENOUS BLD VENIPUNCTURE: CPT

## 2018-11-27 NOTE — HISTORY OF PRESENT ILLNESS
[FreeTextEntry1] : 87 yo retired  w/ a h/o heart murmur and AF for several years maintained on warfarin and self [facility], CHACON and marked edema w/ a significant bradycardia leading to PPM([employer]). NSVTalso detedted on monitoring. TTE revealed a hyperdynamic LV w/ mild MR, moderate AS w/ a peak gradient of 41 mmHg and 1.4 cm orifice area.  There was severe PH(63 mmHg).  He now presents, accompanied by his daughter and granddaughter.  He has been feeling generally well. He is living alone w/ the help of his daughter.  Ambulates short distances w/ cane. He reports poor appetite and has lost a significant amount of weight(some of which was significant edema which has resolved).\par \par No chest, throat, jaw or UE pain other than weakness.numbness of his right hand.  No edema, sleeps comfortably on 2 pillows.  Mild CHACON.\par \par His background is remarkable for DM which at one point required insulin and now off all meds.\par Admitted to [facility] ~3 years ago with confusion.\par Known CKD.  4/18 [facility] U/S revealed a shrunken RK(7.7 cm).   . He is s/p TURP,but s not sen a urologist for some time.

## 2018-11-27 NOTE — REASON FOR VISIT
[Initial Evaluation] : an initial evaluation of [Anticoagulation] : anticoagulation [Aortic Stenosis] : aortic stenosis [Atrial Fibrillation] : atrial fibrillation [Mitral Regurgitation] : mitral regurgitation [FreeTextEntry1] : pulmonary hypertension

## 2018-11-28 LAB
ALBUMIN SERPL ELPH-MCNC: 4.1 G/DL
ALP BLD-CCNC: 62 U/L
ALT SERPL-CCNC: 12 U/L
ANION GAP SERPL CALC-SCNC: 17 MMOL/L
AST SERPL-CCNC: 18 U/L
BASOPHILS # BLD AUTO: 0.02 K/UL
BASOPHILS NFR BLD AUTO: 0.2 %
BILIRUB SERPL-MCNC: 0.4 MG/DL
BUN SERPL-MCNC: 113 MG/DL
CALCIUM SERPL-MCNC: 9 MG/DL
CHLORIDE SERPL-SCNC: 110 MMOL/L
CHOLEST SERPL-MCNC: 146 MG/DL
CHOLEST/HDLC SERPL: 5.8 RATIO
CO2 SERPL-SCNC: 17 MMOL/L
CREAT SERPL-MCNC: 3.24 MG/DL
EOSINOPHIL # BLD AUTO: 0.06 K/UL
EOSINOPHIL NFR BLD AUTO: 0.6 %
GLUCOSE SERPL-MCNC: 79 MG/DL
HBA1C MFR BLD HPLC: 4.9 %
HCT VFR BLD CALC: 30.1 %
HDLC SERPL-MCNC: 25 MG/DL
HGB BLD-MCNC: 9.9 G/DL
IMM GRANULOCYTES NFR BLD AUTO: 0.8 %
LDLC SERPL CALC-MCNC: 75 MG/DL
LYMPHOCYTES # BLD AUTO: 1.47 K/UL
LYMPHOCYTES NFR BLD AUTO: 15.4 %
MAN DIFF?: NORMAL
MCHC RBC-ENTMCNC: 29.3 PG
MCHC RBC-ENTMCNC: 32.9 GM/DL
MCV RBC AUTO: 89.1 FL
MONOCYTES # BLD AUTO: 0.76 K/UL
MONOCYTES NFR BLD AUTO: 8 %
NEUTROPHILS # BLD AUTO: 7.16 K/UL
NEUTROPHILS NFR BLD AUTO: 75 %
NT-PROBNP SERPL-MCNC: ABNORMAL PG/ML
PLATELET # BLD AUTO: 371 K/UL
POTASSIUM SERPL-SCNC: 4.2 MMOL/L
PROT SERPL-MCNC: 6.9 G/DL
RBC # BLD: 3.38 M/UL
RBC # FLD: 15.9 %
SODIUM SERPL-SCNC: 144 MMOL/L
TRIGL SERPL-MCNC: 230 MG/DL
WBC # FLD AUTO: 9.55 K/UL

## 2019-02-05 ENCOUNTER — MEDICATION RENEWAL (OUTPATIENT)
Age: 84
End: 2019-02-05

## 2019-02-06 ENCOUNTER — MEDICATION RENEWAL (OUTPATIENT)
Age: 84
End: 2019-02-06

## 2019-02-20 ENCOUNTER — NON-APPOINTMENT (OUTPATIENT)
Age: 84
End: 2019-02-20

## 2019-02-20 ENCOUNTER — APPOINTMENT (OUTPATIENT)
Dept: CARDIOLOGY | Facility: CLINIC | Age: 84
End: 2019-02-20
Payer: MEDICARE

## 2019-02-20 VITALS
DIASTOLIC BLOOD PRESSURE: 62 MMHG | HEIGHT: 70 IN | HEART RATE: 70 BPM | OXYGEN SATURATION: 100 % | BODY MASS INDEX: 21.47 KG/M2 | WEIGHT: 150 LBS | SYSTOLIC BLOOD PRESSURE: 136 MMHG

## 2019-02-20 LAB — INR PPP: 4.7

## 2019-02-20 PROCEDURE — 85610 PROTHROMBIN TIME: CPT | Mod: QW

## 2019-02-20 PROCEDURE — 93279 PRGRMG DEV EVAL PM/LDLS PM: CPT

## 2019-02-20 PROCEDURE — 99214 OFFICE O/P EST MOD 30 MIN: CPT | Mod: 25

## 2019-02-20 NOTE — HISTORY OF PRESENT ILLNESS
[FreeTextEntry1] : He presents and schedule followup, accompanied by his daughter. He has been feeling well with the exception of left greater than right knee pain. Saw Dr. Lorenzo on February 13, who performed arthrocentesis of his left knee and prescribed a tapering course of prednisone. His relief has been dramatic and he feels well. Ambulates slowly with his cane without any effort provoked symptoms.\par \par No c/o chest, throat,jaw, arm or upper back discomfort.  No dyspnea, orthopnea or PND.  No palpitations, dizziness or syncope. Minimal left ankle edema. No claudication.\par \par \par \par

## 2019-03-26 LAB — INR PPP: 3.2

## 2019-04-11 LAB
INR PPP: 4.1
PROTHROMBIN TIME COMMENT: NORMAL

## 2019-04-24 ENCOUNTER — APPOINTMENT (OUTPATIENT)
Dept: CARDIOLOGY | Facility: CLINIC | Age: 84
End: 2019-04-24
Payer: MEDICARE

## 2019-04-24 ENCOUNTER — NON-APPOINTMENT (OUTPATIENT)
Age: 84
End: 2019-04-24

## 2019-04-24 VITALS
HEART RATE: 57 BPM | WEIGHT: 158 LBS | BODY MASS INDEX: 22.62 KG/M2 | SYSTOLIC BLOOD PRESSURE: 121 MMHG | OXYGEN SATURATION: 95 % | DIASTOLIC BLOOD PRESSURE: 67 MMHG | HEIGHT: 70 IN

## 2019-04-24 LAB — INR PPP: 2.3 RATIO

## 2019-04-24 PROCEDURE — 36415 COLL VENOUS BLD VENIPUNCTURE: CPT

## 2019-04-24 PROCEDURE — 85610 PROTHROMBIN TIME: CPT | Mod: QW

## 2019-04-24 PROCEDURE — 99215 OFFICE O/P EST HI 40 MIN: CPT

## 2019-04-24 PROCEDURE — 93000 ELECTROCARDIOGRAM COMPLETE: CPT

## 2019-04-24 NOTE — PHYSICAL EXAM
[General Appearance - Well Developed] : well developed [Normal Appearance] : normal appearance [Well Groomed] : well groomed [No Deformities] : no deformities [General Appearance - Well Nourished] : well nourished [Normal Conjunctiva] : the conjunctiva exhibited no abnormalities [General Appearance - In No Acute Distress] : no acute distress [Normal Oral Mucosa] : normal oral mucosa [Eyelids - No Xanthelasma] : the eyelids demonstrated no xanthelasmas [JVD Elevated _____cm] : JVD elevated [unfilled] ~U cm above clavicle [No Oral Pallor] : no oral pallor [No Oral Cyanosis] : no oral cyanosis [Respiration, Rhythm And Depth] : normal respiratory rhythm and effort [Not Palpable] : not palpable [Decreased Breath Sounds] : decreased breath sounds [Normal S1] : normal S1 [Irregularly Irregular] : irregularly irregular [No Gallop] : no gallop heard [A2 Diminished] : had a diminished A2 [Holosystolic] : holosystolic [III] : a grade 3 [Medium] : medium pitched [Harsh] : harsh [Carotids] : the murmur was transmitted to the carotid arteries [1+] : Carotid: right 1+ [___ +] : bilateral [unfilled]U+ pretibial pitting edema [Abdomen Tenderness] : non-tender [Abdomen Soft] : soft [Abdomen Mass (___ Cm)] : no abdominal mass palpated [Nail Clubbing] : no clubbing of the fingernails [Cyanosis, Localized] : no localized cyanosis [Petechial Hemorrhages (___cm)] : no petechial hemorrhages [No Venous Stasis] : no venous stasis [Skin Color & Pigmentation] : normal skin color and pigmentation [] : no rash [No Skin Ulcers] : no skin ulcer [Skin Lesions] : no skin lesions [Affect] : the affect was normal [No Xanthoma] : no  xanthoma was observed [Oriented To Time, Place, And Person] : oriented to person, place, and time [Mood] : the mood was normal [No Anxiety] : not feeling anxious [Normal S2] : abnormal S2 [FreeTextEntry1] : walks with cane, slow and unsteady

## 2019-04-24 NOTE — REASON FOR VISIT
[Follow-Up - Clinic] : a clinic follow-up of [Anticoagulation] : anticoagulation [Aortic Stenosis] : aortic stenosis [Atrial Fibrillation] : atrial fibrillation [Mitral Regurgitation] : mitral regurgitation [FreeTextEntry1] : pulmonary hypertension

## 2019-04-24 NOTE — HISTORY OF PRESENT ILLNESS
[FreeTextEntry1] : Mr. Singh West is an 88 year old man requesting urgent visit complaining of increased shortness of breath and lower extremity edema. He is accompanied by his daughter. He ambulates slowly with his cane without any effort provoked symptoms. He denies chest pain and there is no orthopnea or paroxysmal nocturnal dyspnea Wakes three times a night with nocturia which is unchanged. On review has been eating delicatessen cold cuts for past week which is unusual for him.\par \par \par \par

## 2019-04-24 NOTE — DISCUSSION/SUMMARY
[INR Therapeutic] : the patient's INR is therapeutic [Moderate Aortic Stenosis] : moderate aortic stenosis [None] : none [Calcific Aortic Stenosis] : calcific aortic stenosis [Permanent Atrial Fibrillation] : permanent atrial fibrillation [Echocardiogram] : echocardiogram [Anticoagulation] : anticoagulation [Stable] : stable [Decompensated] : decompensated [Acute Diastolic Heart Failure] : acute diastolic congestive heart failure [Patient] : the patient [Sodium Restriction] : sodium restriction [de-identified] : and has Micra pacemaker [de-identified] : weight up 8 pounds [de-identified] : attempt switch from furosemide to 100 mg torsemide [FreeTextEntry2] : and daughter

## 2019-04-24 NOTE — REVIEW OF SYSTEMS
[Shortness Of Breath] : shortness of breath [Lower Ext Edema] : lower extremity edema [Nocturia] : nocturia [Negative] : Heme/Lymph [Chest Pain] : no chest pain [Palpitations] : no palpitations

## 2019-04-25 LAB
ANION GAP SERPL CALC-SCNC: 16 MMOL/L
BASOPHILS # BLD AUTO: 0.03 K/UL
BASOPHILS NFR BLD AUTO: 0.4 %
BUN SERPL-MCNC: 119 MG/DL
CALCIUM SERPL-MCNC: 8.7 MG/DL
CHLORIDE SERPL-SCNC: 102 MMOL/L
CO2 SERPL-SCNC: 22 MMOL/L
CREAT SERPL-MCNC: 3.3 MG/DL
EOSINOPHIL # BLD AUTO: 0.07 K/UL
EOSINOPHIL NFR BLD AUTO: 0.8 %
GLUCOSE SERPL-MCNC: 187 MG/DL
HCT VFR BLD CALC: 30.7 %
HGB BLD-MCNC: 9.1 G/DL
IMM GRANULOCYTES NFR BLD AUTO: 1.1 %
LYMPHOCYTES # BLD AUTO: 1.54 K/UL
LYMPHOCYTES NFR BLD AUTO: 18.3 %
MAN DIFF?: NORMAL
MCHC RBC-ENTMCNC: 29 PG
MCHC RBC-ENTMCNC: 29.6 GM/DL
MCV RBC AUTO: 97.8 FL
MONOCYTES # BLD AUTO: 0.78 K/UL
MONOCYTES NFR BLD AUTO: 9.3 %
NEUTROPHILS # BLD AUTO: 5.89 K/UL
NEUTROPHILS NFR BLD AUTO: 70.1 %
PLATELET # BLD AUTO: 252 K/UL
POTASSIUM SERPL-SCNC: 4.3 MMOL/L
RBC # BLD: 3.14 M/UL
RBC # FLD: 17.8 %
SODIUM SERPL-SCNC: 140 MMOL/L
WBC # FLD AUTO: 8.4 K/UL

## 2019-04-30 LAB — INR PPP: 2.6

## 2019-05-01 ENCOUNTER — NON-APPOINTMENT (OUTPATIENT)
Age: 84
End: 2019-05-01

## 2019-05-01 ENCOUNTER — APPOINTMENT (OUTPATIENT)
Dept: CARDIOLOGY | Facility: CLINIC | Age: 84
End: 2019-05-01
Payer: MEDICARE

## 2019-05-01 VITALS
HEART RATE: 60 BPM | BODY MASS INDEX: 22.48 KG/M2 | WEIGHT: 157 LBS | DIASTOLIC BLOOD PRESSURE: 62 MMHG | HEIGHT: 70 IN | SYSTOLIC BLOOD PRESSURE: 132 MMHG

## 2019-05-01 DIAGNOSIS — I50.33 ACUTE ON CHRONIC DIASTOLIC (CONGESTIVE) HEART FAILURE: ICD-10-CM

## 2019-05-01 LAB — INR PPP: 2.8 RATIO

## 2019-05-01 PROCEDURE — 99215 OFFICE O/P EST HI 40 MIN: CPT | Mod: 25

## 2019-05-01 PROCEDURE — 93306 TTE W/DOPPLER COMPLETE: CPT

## 2019-05-01 PROCEDURE — 93000 ELECTROCARDIOGRAM COMPLETE: CPT

## 2019-05-01 PROCEDURE — 85610 PROTHROMBIN TIME: CPT | Mod: QW

## 2019-05-01 PROCEDURE — 99215 OFFICE O/P EST HI 40 MIN: CPT

## 2019-05-01 NOTE — CARDIOLOGY SUMMARY
[___] : [unfilled] [LVEF ___%] : LVEF [unfilled]% [None] : normal LV function [Severe] : severe pulmonary hypertension  [Enlarged] : enlarged LA size [Mild] : mild mitral regurgitation

## 2019-05-01 NOTE — PHYSICAL EXAM
[General Appearance - Well Developed] : well developed [Normal Appearance] : normal appearance [General Appearance - Well Nourished] : well nourished [Well Groomed] : well groomed [No Deformities] : no deformities [Normal Conjunctiva] : the conjunctiva exhibited no abnormalities [General Appearance - In No Acute Distress] : no acute distress [Eyelids - No Xanthelasma] : the eyelids demonstrated no xanthelasmas [No Oral Pallor] : no oral pallor [Normal Oral Mucosa] : normal oral mucosa [No Oral Cyanosis] : no oral cyanosis [JVD Elevated _____cm] : JVD elevated [unfilled] ~U cm above clavicle [Respiration, Rhythm And Depth] : normal respiratory rhythm and effort [Not Palpable] : not palpable [Decreased Breath Sounds] : decreased breath sounds [Normal S1] : normal S1 [Irregularly Irregular] : irregularly irregular [No Gallop] : no gallop heard [A2 Diminished] : had a diminished A2 [Holosystolic] : holosystolic [Medium] : medium pitched [Harsh] : harsh [III] : a grade 3 [Carotids] : the murmur was transmitted to the carotid arteries [1+] : left 1+ [Abdomen Soft] : soft [Abdomen Tenderness] : non-tender [Abdomen Mass (___ Cm)] : no abdominal mass palpated [Cyanosis, Localized] : no localized cyanosis [Nail Clubbing] : no clubbing of the fingernails [Petechial Hemorrhages (___cm)] : no petechial hemorrhages [] : no rash [Skin Color & Pigmentation] : normal skin color and pigmentation [No Venous Stasis] : no venous stasis [No Skin Ulcers] : no skin ulcer [Skin Lesions] : no skin lesions [No Xanthoma] : no  xanthoma was observed [Oriented To Time, Place, And Person] : oriented to person, place, and time [Mood] : the mood was normal [No Anxiety] : not feeling anxious [Affect] : the affect was normal [___ +] : bilateral [unfilled]U+ pretibial pitting edema [P2 Accentuated] : had an accentuated P2 [Normal S2] : abnormal S2 [FreeTextEntry1] : walks with cane, slow and unsteady

## 2019-05-01 NOTE — DISCUSSION/SUMMARY
[INR Therapeutic] : the patient's INR is therapeutic [Permanent Atrial Fibrillation] : permanent atrial fibrillation [Stable] : stable [Patient] : the patient [None] : none [Anticoagulation] : anticoagulation [Acute Diastolic Heart Failure] : acute diastolic congestive heart failure [Decompensated] : decompensated [Sodium Restriction] : sodium restriction [___ Month(s)] : [unfilled] month(s) [de-identified] : symptomatically improved and edema somewhat reduced with switch from furosemide to 100 mg torsemide, labs pending [de-identified] : and has Micra pacemaker [de-identified] : weight up 8 pounds on exam last week, only slightly reduced [FreeTextEntry3] : as scheduled with Dr. Caceres [FreeTextEntry2] : and daughter

## 2019-05-01 NOTE — HISTORY OF PRESENT ILLNESS
[FreeTextEntry1] : Mr. Singh West is an 88 year old man seen last week for urgent visit complaining of increased shortness of breath and lower extremity edema. He is accompanied by his daughter. He ambulates slowly with his cane without any effort provoked symptoms. He denies chest pain and there is no orthopnea or paroxysmal nocturnal dyspnea Wakes three times a night with nocturia which is unchanged. On review had been eating delicatessen cold cuts for prior week which is unusual for him.\par \par \par \par

## 2019-05-02 LAB
ALBUMIN SERPL ELPH-MCNC: 4.1 G/DL
ALP BLD-CCNC: 60 U/L
ALT SERPL-CCNC: 15 U/L
ANION GAP SERPL CALC-SCNC: 16 MMOL/L
AST SERPL-CCNC: 18 U/L
BASOPHILS # BLD AUTO: 0.03 K/UL
BASOPHILS NFR BLD AUTO: 0.4 %
BILIRUB SERPL-MCNC: 0.3 MG/DL
BUN SERPL-MCNC: 117 MG/DL
CALCIUM SERPL-MCNC: 8.8 MG/DL
CHLORIDE SERPL-SCNC: 104 MMOL/L
CO2 SERPL-SCNC: 21 MMOL/L
CREAT SERPL-MCNC: 3.18 MG/DL
EOSINOPHIL # BLD AUTO: 0.06 K/UL
EOSINOPHIL NFR BLD AUTO: 0.8 %
ERYTHROCYTE [SEDIMENTATION RATE] IN BLOOD BY WESTERGREN METHOD: 47 MM/HR
GLUCOSE SERPL-MCNC: 145 MG/DL
HCT VFR BLD CALC: 31.5 %
HGB BLD-MCNC: 9.6 G/DL
IMM GRANULOCYTES NFR BLD AUTO: 0.7 %
LYMPHOCYTES # BLD AUTO: 1.27 K/UL
LYMPHOCYTES NFR BLD AUTO: 17.1 %
MAN DIFF?: NORMAL
MCHC RBC-ENTMCNC: 28.5 PG
MCHC RBC-ENTMCNC: 30.5 GM/DL
MCV RBC AUTO: 93.5 FL
MONOCYTES # BLD AUTO: 0.7 K/UL
MONOCYTES NFR BLD AUTO: 9.4 %
NEUTROPHILS # BLD AUTO: 5.33 K/UL
NEUTROPHILS NFR BLD AUTO: 71.6 %
PLATELET # BLD AUTO: 297 K/UL
POTASSIUM SERPL-SCNC: 4.4 MMOL/L
PROT SERPL-MCNC: 7 G/DL
RBC # BLD: 3.37 M/UL
RBC # FLD: 17.2 %
SODIUM SERPL-SCNC: 141 MMOL/L
T4 FREE SERPL-MCNC: 1.2 NG/DL
TSH SERPL-ACNC: 3.14 UIU/ML
URATE SERPL-MCNC: 9.2 MG/DL
WBC # FLD AUTO: 7.44 K/UL

## 2019-05-03 ENCOUNTER — MEDICATION RENEWAL (OUTPATIENT)
Age: 84
End: 2019-05-03

## 2019-05-22 LAB — INR PPP: 3.2 RATIO

## 2019-05-26 ENCOUNTER — MOBILE ON CALL (OUTPATIENT)
Age: 84
End: 2019-05-26

## 2019-05-28 ENCOUNTER — INBOUND DOCUMENT (OUTPATIENT)
Age: 84
End: 2019-05-28

## 2019-05-29 ENCOUNTER — CLINICAL ADVICE (OUTPATIENT)
Age: 84
End: 2019-05-29

## 2019-06-04 ENCOUNTER — NON-APPOINTMENT (OUTPATIENT)
Age: 84
End: 2019-06-04

## 2019-06-04 ENCOUNTER — APPOINTMENT (OUTPATIENT)
Dept: CARDIOLOGY | Facility: CLINIC | Age: 84
End: 2019-06-04
Payer: MEDICARE

## 2019-06-04 VITALS
BODY MASS INDEX: 23.62 KG/M2 | OXYGEN SATURATION: 94 % | WEIGHT: 165 LBS | HEIGHT: 70 IN | SYSTOLIC BLOOD PRESSURE: 142 MMHG | HEART RATE: 85 BPM | DIASTOLIC BLOOD PRESSURE: 62 MMHG

## 2019-06-04 DIAGNOSIS — Z79.899 OTHER LONG TERM (CURRENT) DRUG THERAPY: ICD-10-CM

## 2019-06-04 DIAGNOSIS — I35.0 NONRHEUMATIC AORTIC (VALVE) STENOSIS: ICD-10-CM

## 2019-06-04 DIAGNOSIS — I48.91 UNSPECIFIED ATRIAL FIBRILLATION: ICD-10-CM

## 2019-06-04 DIAGNOSIS — Z95.0 PRESENCE OF CARDIAC PACEMAKER: ICD-10-CM

## 2019-06-04 DIAGNOSIS — Z79.01 LONG TERM (CURRENT) USE OF ANTICOAGULANTS: ICD-10-CM

## 2019-06-04 DIAGNOSIS — I34.0 NONRHEUMATIC MITRAL (VALVE) INSUFFICIENCY: ICD-10-CM

## 2019-06-04 DIAGNOSIS — I47.2 VENTRICULAR TACHYCARDIA: ICD-10-CM

## 2019-06-04 LAB — INR PPP: 3.5 RATIO

## 2019-06-04 PROCEDURE — 99214 OFFICE O/P EST MOD 30 MIN: CPT

## 2019-06-04 PROCEDURE — 36415 COLL VENOUS BLD VENIPUNCTURE: CPT

## 2019-06-04 PROCEDURE — 93000 ELECTROCARDIOGRAM COMPLETE: CPT

## 2019-06-04 PROCEDURE — 85610 PROTHROMBIN TIME: CPT | Mod: QW

## 2019-06-04 NOTE — HISTORY OF PRESENT ILLNESS
[FreeTextEntry1] : He presents and schedule followup, accompanied by his daughter. He has been feeling well.  Dyspnea has resolved(Although weight has incresed?) Mild left edema persists,  Sleeps comfortable on 1-2 pillows. Just completed another brief course of prednisone for right foot pain.l. Ambulates slowly with his cane without any effort provoked symptoms.\par \par No c/o chest, throat,jaw, arm or upper back discomfort.  No dyspnea, orthopnea or PND.  No palpitations, dizziness or syncope.No claudication.\par \par \par \par

## 2019-06-05 LAB
ALBUMIN SERPL ELPH-MCNC: 4.1 G/DL
ALP BLD-CCNC: 56 U/L
ALT SERPL-CCNC: 13 U/L
ANION GAP SERPL CALC-SCNC: 20 MMOL/L
AST SERPL-CCNC: 9 U/L
BILIRUB SERPL-MCNC: 0.2 MG/DL
BUN SERPL-MCNC: 125 MG/DL
CALCIUM SERPL-MCNC: 8.3 MG/DL
CHLORIDE SERPL-SCNC: 105 MMOL/L
CO2 SERPL-SCNC: 17 MMOL/L
CREAT SERPL-MCNC: 3.52 MG/DL
GLUCOSE SERPL-MCNC: 219 MG/DL
POTASSIUM SERPL-SCNC: 4.1 MMOL/L
PROT SERPL-MCNC: 6.3 G/DL
SODIUM SERPL-SCNC: 142 MMOL/L

## 2019-06-06 LAB
BASOPHILS # BLD AUTO: 0.02 K/UL
BASOPHILS NFR BLD AUTO: 0.1 %
EOSINOPHIL # BLD AUTO: 0.03 K/UL
EOSINOPHIL NFR BLD AUTO: 0.2 %
HCT VFR BLD CALC: 32.6 %
HGB BLD-MCNC: 10.1 G/DL
IMM GRANULOCYTES NFR BLD AUTO: 2.3 %
LYMPHOCYTES # BLD AUTO: 1.52 K/UL
LYMPHOCYTES NFR BLD AUTO: 10.5 %
MAGNESIUM SERPL-MCNC: 1.8 MG/DL
MAN DIFF?: NORMAL
MCHC RBC-ENTMCNC: 28.9 PG
MCHC RBC-ENTMCNC: 31 GM/DL
MCV RBC AUTO: 93.1 FL
MONOCYTES # BLD AUTO: 0.93 K/UL
MONOCYTES NFR BLD AUTO: 6.4 %
NEUTROPHILS # BLD AUTO: 11.68 K/UL
NEUTROPHILS NFR BLD AUTO: 80.5 %
NT-PROBNP SERPL-MCNC: ABNORMAL PG/ML
PLATELET # BLD AUTO: 338 K/UL
RBC # BLD: 3.5 M/UL
RBC # FLD: 17.1 %
WBC # FLD AUTO: 14.52 K/UL

## 2019-06-07 ENCOUNTER — EMERGENCY (EMERGENCY)
Facility: HOSPITAL | Age: 84
LOS: 1 days | Discharge: HOME | End: 2019-06-07
Attending: EMERGENCY MEDICINE
Payer: MEDICARE

## 2019-06-07 VITALS
SYSTOLIC BLOOD PRESSURE: 155 MMHG | RESPIRATION RATE: 16 BRPM | OXYGEN SATURATION: 98 % | HEART RATE: 76 BPM | TEMPERATURE: 98 F | DIASTOLIC BLOOD PRESSURE: 83 MMHG

## 2019-06-07 PROCEDURE — 99284 EMERGENCY DEPT VISIT MOD MDM: CPT

## 2019-06-07 NOTE — ED ADULT NURSE NOTE - OBJECTIVE STATEMENT
89 yo M w/ PMHx of afib, HTN, CHF presents to ED c/o R flank pain. Pt states pain began yesterday, has been persistent since that time. Pt states he was recently treated for UTI w/ oral abx after routine UA at PMD was positive. Pt reports recent changes to diuretics, was changed from furosemide to torsemide 100mg, this week received call from PMD to change dose from 100 mg torsemide to 50 mg torsemide 1x/day. Pt denies taking pain medication prior to arrival at ED. Denies frequency or burning w/ urination. No other recent illness or injury. Pt denies any CP, SOB, N/V, fever, chills, urinary complaints, constipation, diarrhea, HA, dizziness, weakness. Pt A&Ox4, lungs CTA, +central pulses. Abdomen soft, not tender, not distended. Ambulating w/ steady gait, safety and comfort maintained, no acute distress noted at this time.

## 2019-06-07 NOTE — ED PROVIDER NOTE - NSFOLLOWUPINSTRUCTIONS_ED_ALL_ED_FT
You were seen in the ER for abdominal pain likely related to gallstones today.   THere is no infection in your gallbladder currently.     Please follow up in the general surgery clinic for ongoing evaluation.     Return to the ER for any worsening pain, fever, vomiting, or any other concerns.     Avoid very fatty foods.

## 2019-06-07 NOTE — ED PROVIDER NOTE - NS ED ROS FT
REVIEW OF SYSTEMS:    CONSTITUTIONAL: No weakness, fevers or chills  EYES/ENT: No visual changes;  No vertigo or throat pain   NECK: No pain or stiffness  RESPIRATORY: No cough, wheezing, hemoptysis; No shortness of breath  CARDIOVASCULAR: No chest pain or palpitations  GASTROINTESTINAL: alternating r/l lowe quadrant abdominal pain, no n/v/d  GENITOURINARY: No dysuria  NEUROLOGICAL: No numbness or weakness  SKIN: No itching, rashes

## 2019-06-07 NOTE — ED ADULT NURSE NOTE - CHPI ED NUR SYMPTOMS NEG
no blood in stool/no dysuria/no fever/no hematuria/no nausea/no diarrhea/no vomiting/no abdominal distension/no burning urination/no chills

## 2019-06-07 NOTE — ED PROVIDER NOTE - NSFOLLOWUPCLINICS_GEN_ALL_ED_FT
[facility]  General Surgery  [address] [place] [address] - [address] [place] [address]  Phone: [phone]  Fax:   Follow Up Time:

## 2019-06-07 NOTE — ED PROVIDER NOTE - ATTENDING CONTRIBUTION TO CARE
88 yom pmhx a fib on coumadin, chf, htn, ppm, known ckd, prior renal stone many years ago states passed it spont, presents with left and right flank pain. states initially felt left flank pain, but then felt a "stabbing" to right flank and RUQ last night. called daughter to come to house and brought pt to ED due to pain, however while waiting in the waiting room, pain resolved. no n/v/d. no hematuria or dysuria. no central cp or sob. states was recently on abx for a uti and just finished; also just switched from lasix to torsemide per family who are wondering if this is the cause of today's sxs.     ROS:   constitutional - no fever, no chills  eyes - no visual changes, no redness  eent - no sore throat, no nasal congestion  cvs - no chest pain, no leg swelling  resp - no shortness of breath, no cough  gi - + abdominal pain, no vomiting, no diarrhea  gu - no dysuria, no hematuria  msk - no acute back pain, no joint swelling  skin - no rashes, no jaundice  neuro - no headache, no focal weakness  psych - no acute mental health issue     Physical Exam:   constitutional - well appearing, awake and alert, oriented x3  head - no external evidence of trauma  eent - no conjunctival injection or icterus, mucous membranes moist   cvs - rrr, no murmurs, no peripheral edema  resp - breath sounds clear and equal bilat, normal respiratory effort  gi - abdomen soft and nontender, no rigidity, guarding or rebound, bowel sounds present  msk - moving all extremities spontaneously, gait is at baseline for patient  neuro - alert and oriented x3, no focal deficits, CNs 2-12 grossly intact  skin- no jaundice, warm and dry  psych - mood and affect wnl, no apparent risk to self or others     pt pain free at time of eval. ? renal colic vs biliary colic, doubt cardiac cause for pain.   ct w gb stones however no acute antonio, pt remains pain free, po challenging well, endorsed to pt and family w results of labs/ ct and need for f/u w outpt surgery for further eval, strict return precautions. additional verbal instructions regarding diagnosis, return precautions and follow up plan given to pt and/or family. TAWANDA Clark MD

## 2019-06-07 NOTE — ED PROVIDER NOTE - PHYSICAL EXAMINATION
PHYSICAL EXAM:  GENERAL: NAD, well-developed  HEAD:  Atraumatic, Normocephalic  EYES: EOMI, PERRLA, conjunctiva and sclera clear  NECK: Supple, No JVD  CHEST/LUNG: Clear to auscultation bilaterally; No wheeze  HEART: Regular rate and rhythm; No murmurs, rubs, or gallops  ABDOMEN: Soft, Nontender, distended  EXTREMITIES:  No clubbing, cyanosis, or edema  PSYCH: AAOx3  NEUROLOGY: non-focal  SKIN: No rashes or lesions

## 2019-06-07 NOTE — ED ADULT NURSE NOTE - NSIMPLEMENTINTERV_GEN_ALL_ED
Implemented All Fall with Harm Risk Interventions:  [place] to call system. Call bell, personal items and telephone within reach. Instruct patient to call for assistance. Room bathroom lighting operational. Non-slip footwear when patient is off stretcher. Physically safe environment: no spills, clutter or unnecessary equipment. Stretcher in lowest position, wheels locked, appropriate side rails in place. Provide visual cue, wrist band, yellow gown, etc. Monitor gait and stability. Monitor for mental status changes and reorient to person, place, and time. Review medications for side effects contributing to fall risk. Reinforce activity limits and safety measures with patient and family. Provide visual clues: red socks.

## 2019-06-08 VITALS
TEMPERATURE: 99 F | DIASTOLIC BLOOD PRESSURE: 74 MMHG | HEART RATE: 76 BPM | OXYGEN SATURATION: 98 % | RESPIRATION RATE: 18 BRPM | SYSTOLIC BLOOD PRESSURE: 139 MMHG

## 2019-06-08 LAB
ALBUMIN SERPL ELPH-MCNC: 4.1 G/DL — SIGNIFICANT CHANGE UP (ref 3.3–5)
ALP SERPL-CCNC: 60 U/L — SIGNIFICANT CHANGE UP (ref 40–120)
ALT FLD-CCNC: 13 U/L — SIGNIFICANT CHANGE UP (ref 10–45)
ANION GAP SERPL CALC-SCNC: 17 MMOL/L — SIGNIFICANT CHANGE UP (ref 5–17)
APPEARANCE UR: CLEAR — SIGNIFICANT CHANGE UP
APTT BLD: 39.9 SEC — HIGH (ref 27.5–36.3)
AST SERPL-CCNC: 12 U/L — SIGNIFICANT CHANGE UP (ref 10–40)
BACTERIA # UR AUTO: NEGATIVE — SIGNIFICANT CHANGE UP
BASOPHILS # BLD AUTO: 0.1 K/UL — SIGNIFICANT CHANGE UP (ref 0–0.2)
BASOPHILS NFR BLD AUTO: 0.3 % — SIGNIFICANT CHANGE UP (ref 0–2)
BILIRUB SERPL-MCNC: 0.4 MG/DL — SIGNIFICANT CHANGE UP (ref 0.2–1.2)
BILIRUB UR-MCNC: NEGATIVE — SIGNIFICANT CHANGE UP
BUN SERPL-MCNC: 118 MG/DL — HIGH (ref 7–23)
CALCIUM SERPL-MCNC: 8.2 MG/DL — LOW (ref 8.4–10.5)
CHLORIDE SERPL-SCNC: 101 MMOL/L — SIGNIFICANT CHANGE UP (ref 96–108)
CO2 SERPL-SCNC: 20 MMOL/L — LOW (ref 22–31)
COLOR SPEC: COLORLESS — SIGNIFICANT CHANGE UP
CREAT SERPL-MCNC: 3.25 MG/DL — HIGH (ref 0.5–1.3)
DIFF PNL FLD: NEGATIVE — SIGNIFICANT CHANGE UP
EOSINOPHIL # BLD AUTO: 0 K/UL — SIGNIFICANT CHANGE UP (ref 0–0.5)
EOSINOPHIL NFR BLD AUTO: 0.2 % — SIGNIFICANT CHANGE UP (ref 0–6)
EPI CELLS # UR: 0 /HPF — SIGNIFICANT CHANGE UP
GAS PNL BLDV: SIGNIFICANT CHANGE UP
GLUCOSE SERPL-MCNC: 210 MG/DL — HIGH (ref 70–99)
GLUCOSE UR QL: NEGATIVE — SIGNIFICANT CHANGE UP
HCT VFR BLD CALC: 31.4 % — LOW (ref 39–50)
HGB BLD-MCNC: 10.4 G/DL — LOW (ref 13–17)
HYALINE CASTS # UR AUTO: 1 /LPF — SIGNIFICANT CHANGE UP (ref 0–2)
INR BLD: 2.45 RATIO — HIGH (ref 0.88–1.16)
KETONES UR-MCNC: NEGATIVE — SIGNIFICANT CHANGE UP
LEUKOCYTE ESTERASE UR-ACNC: NEGATIVE — SIGNIFICANT CHANGE UP
LYMPHOCYTES # BLD AUTO: 1.9 K/UL — SIGNIFICANT CHANGE UP (ref 1–3.3)
LYMPHOCYTES # BLD AUTO: 13.1 % — SIGNIFICANT CHANGE UP (ref 13–44)
MCHC RBC-ENTMCNC: 28.8 PG — SIGNIFICANT CHANGE UP (ref 27–34)
MCHC RBC-ENTMCNC: 33.2 GM/DL — SIGNIFICANT CHANGE UP (ref 32–36)
MCV RBC AUTO: 86.6 FL — SIGNIFICANT CHANGE UP (ref 80–100)
MONOCYTES # BLD AUTO: 1.5 K/UL — HIGH (ref 0–0.9)
MONOCYTES NFR BLD AUTO: 10.1 % — SIGNIFICANT CHANGE UP (ref 2–14)
NEUTROPHILS # BLD AUTO: 11.2 K/UL — HIGH (ref 1.8–7.4)
NEUTROPHILS NFR BLD AUTO: 76.2 % — SIGNIFICANT CHANGE UP (ref 43–77)
NITRITE UR-MCNC: NEGATIVE — SIGNIFICANT CHANGE UP
PH UR: 6 — SIGNIFICANT CHANGE UP (ref 5–8)
PLATELET # BLD AUTO: 259 K/UL — SIGNIFICANT CHANGE UP (ref 150–400)
POTASSIUM SERPL-MCNC: 4.3 MMOL/L — SIGNIFICANT CHANGE UP (ref 3.5–5.3)
POTASSIUM SERPL-SCNC: 4.3 MMOL/L — SIGNIFICANT CHANGE UP (ref 3.5–5.3)
PROT SERPL-MCNC: 6.8 G/DL — SIGNIFICANT CHANGE UP (ref 6–8.3)
PROT UR-MCNC: ABNORMAL
PROTHROM AB SERPL-ACNC: 28.7 SEC — HIGH (ref 10–12.9)
RBC # BLD: 3.63 M/UL — LOW (ref 4.2–5.8)
RBC # FLD: 16.8 % — HIGH (ref 10.3–14.5)
RBC CASTS # UR COMP ASSIST: 0 /HPF — SIGNIFICANT CHANGE UP (ref 0–4)
SODIUM SERPL-SCNC: 138 MMOL/L — SIGNIFICANT CHANGE UP (ref 135–145)
SP GR SPEC: 1.01 — SIGNIFICANT CHANGE UP (ref 1.01–1.02)
UROBILINOGEN FLD QL: NEGATIVE — SIGNIFICANT CHANGE UP
WBC # BLD: 14.8 K/UL — HIGH (ref 3.8–10.5)
WBC # FLD AUTO: 14.8 K/UL — HIGH (ref 3.8–10.5)
WBC UR QL: 0 /HPF — SIGNIFICANT CHANGE UP (ref 0–5)

## 2019-06-08 PROCEDURE — 84132 ASSAY OF SERUM POTASSIUM: CPT

## 2019-06-08 PROCEDURE — 82435 ASSAY OF BLOOD CHLORIDE: CPT

## 2019-06-08 PROCEDURE — 82947 ASSAY GLUCOSE BLOOD QUANT: CPT

## 2019-06-08 PROCEDURE — 85014 HEMATOCRIT: CPT

## 2019-06-08 PROCEDURE — 83605 ASSAY OF LACTIC ACID: CPT

## 2019-06-08 PROCEDURE — 85610 PROTHROMBIN TIME: CPT

## 2019-06-08 PROCEDURE — 80053 COMPREHEN METABOLIC PANEL: CPT

## 2019-06-08 PROCEDURE — 82803 BLOOD GASES ANY COMBINATION: CPT

## 2019-06-08 PROCEDURE — 84295 ASSAY OF SERUM SODIUM: CPT

## 2019-06-08 PROCEDURE — 99284 EMERGENCY DEPT VISIT MOD MDM: CPT | Mod: 25

## 2019-06-08 PROCEDURE — 82330 ASSAY OF CALCIUM: CPT

## 2019-06-08 PROCEDURE — 74176 CT ABD & PELVIS W/O CONTRAST: CPT | Mod: 26

## 2019-06-08 PROCEDURE — 87086 URINE CULTURE/COLONY COUNT: CPT

## 2019-06-08 PROCEDURE — 81001 URINALYSIS AUTO W/SCOPE: CPT

## 2019-06-08 PROCEDURE — 85027 COMPLETE CBC AUTOMATED: CPT

## 2019-06-08 PROCEDURE — 74176 CT ABD & PELVIS W/O CONTRAST: CPT

## 2019-06-08 PROCEDURE — 85730 THROMBOPLASTIN TIME PARTIAL: CPT

## 2019-06-09 LAB
CULTURE RESULTS: SIGNIFICANT CHANGE UP
SPECIMEN SOURCE: SIGNIFICANT CHANGE UP

## 2019-06-10 ENCOUNTER — RX RENEWAL (OUTPATIENT)
Age: 84
End: 2019-06-10

## 2019-06-10 RX ORDER — METOPROLOL TARTRATE 25 MG/1
25 TABLET, FILM COATED ORAL TWICE DAILY
Qty: 60 | Refills: 3 | Status: ACTIVE | COMMUNITY
Start: 2018-05-16 | End: 1900-01-01

## 2019-06-10 RX ORDER — HYDRALAZINE HYDROCHLORIDE 10 MG/1
10 TABLET ORAL
Qty: 90 | Refills: 5 | Status: ACTIVE | COMMUNITY
Start: 1900-01-01 | End: 1900-01-01

## 2019-06-18 LAB
ANION GAP SERPL CALC-SCNC: 15 MMOL/L
BUN SERPL-MCNC: 111 MG/DL
CALCIUM SERPL-MCNC: 9 MG/DL
CHLORIDE SERPL-SCNC: 105 MMOL/L
CO2 SERPL-SCNC: 20 MMOL/L
CREAT SERPL-MCNC: 2.98 MG/DL
GLUCOSE SERPL-MCNC: 155 MG/DL
POTASSIUM SERPL-SCNC: 4.5 MMOL/L
SODIUM SERPL-SCNC: 140 MMOL/L

## 2019-06-20 ENCOUNTER — LABORATORY RESULT (OUTPATIENT)
Age: 84
End: 2019-06-20

## 2019-06-20 ENCOUNTER — APPOINTMENT (OUTPATIENT)
Dept: NEPHROLOGY | Facility: CLINIC | Age: 84
End: 2019-06-20
Payer: MEDICARE

## 2019-06-20 VITALS
OXYGEN SATURATION: 95 % | HEIGHT: 70 IN | WEIGHT: 168 LBS | BODY MASS INDEX: 24.05 KG/M2 | HEART RATE: 60 BPM | SYSTOLIC BLOOD PRESSURE: 131 MMHG | DIASTOLIC BLOOD PRESSURE: 68 MMHG

## 2019-06-20 DIAGNOSIS — Z83.3 FAMILY HISTORY OF DIABETES MELLITUS: ICD-10-CM

## 2019-06-20 DIAGNOSIS — I50.32 CHRONIC DIASTOLIC (CONGESTIVE) HEART FAILURE: ICD-10-CM

## 2019-06-20 DIAGNOSIS — M10.9 GOUT, UNSPECIFIED: ICD-10-CM

## 2019-06-20 DIAGNOSIS — D63.1 CHRONIC KIDNEY DISEASE, UNSPECIFIED: ICD-10-CM

## 2019-06-20 DIAGNOSIS — N18.9 CHRONIC KIDNEY DISEASE, UNSPECIFIED: ICD-10-CM

## 2019-06-20 DIAGNOSIS — D64.9 ANEMIA, UNSPECIFIED: ICD-10-CM

## 2019-06-20 DIAGNOSIS — N18.4 CHRONIC KIDNEY DISEASE, STAGE 4 (SEVERE): ICD-10-CM

## 2019-06-20 PROCEDURE — 99203 OFFICE O/P NEW LOW 30 MIN: CPT

## 2019-06-20 RX ORDER — TORSEMIDE 100 MG/1
100 TABLET ORAL DAILY
Qty: 30 | Refills: 1 | Status: DISCONTINUED | COMMUNITY
Start: 2019-04-24 | End: 2019-06-20

## 2019-06-26 LAB
ALBUMIN SERPL ELPH-MCNC: 3.4 G/DL
ANA SER IF-ACNC: NEGATIVE
ANION GAP SERPL CALC-SCNC: 14 MMOL/L
APPEARANCE: CLEAR
BACTERIA: NEGATIVE
BASOPHILS # BLD AUTO: 0.02 K/UL
BASOPHILS NFR BLD AUTO: 0.2 %
BILIRUBIN URINE: NEGATIVE
BLOOD URINE: NEGATIVE
BUN SERPL-MCNC: 111 MG/DL
C3 SERPL-MCNC: 122 MG/DL
CALCIUM SERPL-MCNC: 8.3 MG/DL
CHLORIDE SERPL-SCNC: 106 MMOL/L
CO2 SERPL-SCNC: 20 MMOL/L
COLOR: NORMAL
CREAT SERPL-MCNC: 3 MG/DL
CREAT SPEC-SCNC: 67 MG/DL
CREAT/PROT UR: 0.5 RATIO
DEPRECATED KAPPA LC FREE/LAMBDA SER: 1.43 RATIO
DSDNA AB SER-ACNC: <12 IU/ML
EOSINOPHIL # BLD AUTO: 0.08 K/UL
EOSINOPHIL NFR BLD AUTO: 1 %
GLUCOSE QUALITATIVE U: NEGATIVE
GLUCOSE SERPL-MCNC: 170 MG/DL
HBV SURFACE AB SER QL: NONREACTIVE
HBV SURFACE AG SER QL: NONREACTIVE
HCT VFR BLD CALC: 28.8 %
HCV AB SER QL: NONREACTIVE
HCV S/CO RATIO: 0.07 S/CO
HGB BLD-MCNC: 8.9 G/DL
HYALINE CASTS: 2 /LPF
IGA 24H UR QL IFE: NORMAL
IGA SER QL IEP: 129 MG/DL
IGG SER QL IEP: 798 MG/DL
IGM SER QL IEP: 135 MG/DL
IMM GRANULOCYTES NFR BLD AUTO: 1.2 %
KAPPA LC CSF-MCNC: 7.45 MG/DL
KAPPA LC SERPL-MCNC: 10.63 MG/DL
KETONES URINE: NEGATIVE
LEUKOCYTE ESTERASE URINE: ABNORMAL
LYMPHOCYTES # BLD AUTO: 1.14 K/UL
LYMPHOCYTES NFR BLD AUTO: 13.7 %
M PROTEIN SPEC IFE-MCNC: NORMAL
MAN DIFF?: NORMAL
MCHC RBC-ENTMCNC: 27.8 PG
MCHC RBC-ENTMCNC: 30.9 GM/DL
MCV RBC AUTO: 90 FL
MICROSCOPIC-UA: NORMAL
MONOCYTES # BLD AUTO: 0.76 K/UL
MONOCYTES NFR BLD AUTO: 9.2 %
MPO AB + PR3 PNL SER: NORMAL
NEUTROPHILS # BLD AUTO: 6.2 K/UL
NEUTROPHILS NFR BLD AUTO: 74.7 %
NITRITE URINE: NEGATIVE
PH URINE: 6
PHOSPHATE SERPL-MCNC: 3.4 MG/DL
PLATELET # BLD AUTO: 365 K/UL
POTASSIUM SERPL-SCNC: 4.5 MMOL/L
PROT UR-MCNC: 32 MG/DL
PROTEIN URINE: ABNORMAL
RBC # BLD: 3.2 M/UL
RBC # FLD: 15.6 %
RED BLOOD CELLS URINE: 1 /HPF
SODIUM SERPL-SCNC: 140 MMOL/L
SPECIFIC GRAVITY URINE: 1.01
SQUAMOUS EPITHELIAL CELLS: 2 /HPF
UROBILINOGEN URINE: NORMAL
WBC # FLD AUTO: 8.3 K/UL
WHITE BLOOD CELLS URINE: 26 /HPF

## 2019-06-26 NOTE — ASSESSMENT
[FreeTextEntry1] : A case of Chronic kidney disease stage IV with anemia, gouty arthritis, CHF, h/o diabetes and hypertension. s/p pacemaker on coumadin. Cause of CKD could be multifactorial including aging, diabetes, hypertension.Advised w/u. Lab tests discussed with the daughter. Serum creatinine 3. 0 with GFR 18 ml/min. Low Hb 8.9. Advised to start procrit.

## 2019-06-26 NOTE — PHYSICAL EXAM
[General Appearance - Alert] : alert [General Appearance - In No Acute Distress] : in no acute distress [Outer Ear] : the ears and nose were normal in appearance [Sclera] : the sclera and conjunctiva were normal [Neck Appearance] : the appearance of the neck was normal [Respiration, Rhythm And Depth] : normal respiratory rhythm and effort [] : no respiratory distress [Exaggerated Use Of Accessory Muscles For Inspiration] : no accessory muscle use [Apical Impulse] : the apical impulse was normal [Heart Rate And Rhythm] : heart rate was normal and rhythm regular [___ +] : bilateral [unfilled]+ pitting edema to the ankles [Pitting Edema] : pitting edema present [Cervical Lymph Nodes Enlarged Posterior Bilaterally] : posterior cervical [Abdomen Soft] : soft [Bowel Sounds] : normal bowel sounds [Cervical Lymph Nodes Enlarged Anterior Bilaterally] : anterior cervical [No CVA Tenderness] : no ~M costovertebral angle tenderness [Oriented To Time, Place, And Person] : oriented to person, place, and time [FreeTextEntry1] : wheel chair bound

## 2019-06-26 NOTE — REVIEW OF SYSTEMS
[Feeling Tired] : feeling tired [Eyesight Problems] : eyesight problems [Lower Ext Edema] : lower extremity edema [SOB on Exertion] : shortness of breath during exertion [Nocturia] : nocturia [Arthralgias] : arthralgias [Joint Pain] : joint pain [Itching] : itching [Sleep Disturbances] : sleep disturbances [Muscle Weakness] : muscle weakness [Easy Bleeding] : a tendency for easy bleeding [Easy Bruising] : a tendency for easy bruising [Recent Weight Gain (___ Lbs)] : no recent weight gain [Loss Of Hearing] : no hearing loss [Recent Weight Loss (___ Lbs)] : no recent weight loss [Palpitations] : no palpitations [Chest Pain] : no chest pain [Diarrhea] : no diarrhea [Heartburn] : no heartburn [Constipation] : no constipation [Dizziness] : no dizziness [Fainting] : no fainting [FreeTextEntry3] : s/p cataract surgery

## 2019-06-26 NOTE — HISTORY OF PRESENT ILLNESS
[FreeTextEntry1] : A case of CKD stage IV with h/o diabetes and hypertension, gout, CHF and s/p pacemaker, BPH being evaluated for kidney function. Patient went to [facility] last Friday and was told to have gall stone.

## 2019-07-01 ENCOUNTER — MEDICATION RENEWAL (OUTPATIENT)
Age: 84
End: 2019-07-01

## 2019-07-01 ENCOUNTER — FORM ENCOUNTER (OUTPATIENT)
Age: 84
End: 2019-07-01

## 2019-07-02 ENCOUNTER — INPATIENT (INPATIENT)
Facility: HOSPITAL | Age: 84
LOS: 7 days | Discharge: NURSING, REHAB OR LONG-TERM CARE | DRG: 603 | End: 2019-07-10
Attending: INTERNAL MEDICINE | Admitting: HOSPITALIST
Payer: MEDICARE

## 2019-07-02 ENCOUNTER — APPOINTMENT (OUTPATIENT)
Dept: NEPHROLOGY | Facility: CLINIC | Age: 84
End: 2019-07-02

## 2019-07-02 ENCOUNTER — APPOINTMENT (OUTPATIENT)
Dept: ULTRASOUND IMAGING | Facility: CLINIC | Age: 84
End: 2019-07-02
Payer: MEDICARE

## 2019-07-02 ENCOUNTER — OUTPATIENT (OUTPATIENT)
Dept: OUTPATIENT SERVICES | Facility: HOSPITAL | Age: 84
LOS: 1 days | End: 2019-07-02
Payer: MEDICARE

## 2019-07-02 ENCOUNTER — MEDICATION RENEWAL (OUTPATIENT)
Age: 84
End: 2019-07-02

## 2019-07-02 VITALS
SYSTOLIC BLOOD PRESSURE: 117 MMHG | RESPIRATION RATE: 17 BRPM | DIASTOLIC BLOOD PRESSURE: 63 MMHG | HEART RATE: 63 BPM | OXYGEN SATURATION: 98 % | TEMPERATURE: 98 F

## 2019-07-02 VITALS
DIASTOLIC BLOOD PRESSURE: 59 MMHG | HEIGHT: 70 IN | HEART RATE: 54 BPM | SYSTOLIC BLOOD PRESSURE: 114 MMHG | OXYGEN SATURATION: 99 %

## 2019-07-02 DIAGNOSIS — L03.90 CELLULITIS, UNSPECIFIED: ICD-10-CM

## 2019-07-02 DIAGNOSIS — Z00.8 ENCOUNTER FOR OTHER GENERAL EXAMINATION: ICD-10-CM

## 2019-07-02 PROBLEM — N18.9 ANEMIA ASSOCIATED WITH CHRONIC RENAL FAILURE: Status: ACTIVE | Noted: 2019-07-02

## 2019-07-02 LAB
ALBUMIN SERPL ELPH-MCNC: 3.4 G/DL — SIGNIFICANT CHANGE UP (ref 3.3–5)
ALP SERPL-CCNC: 64 U/L — SIGNIFICANT CHANGE UP (ref 40–120)
ALT FLD-CCNC: 6 U/L — LOW (ref 10–45)
APTT BLD: 49.2 SEC — HIGH (ref 27.5–36.3)
AST SERPL-CCNC: 6 U/L — LOW (ref 10–40)
BASOPHILS # BLD AUTO: 0 K/UL — SIGNIFICANT CHANGE UP (ref 0–0.2)
BASOPHILS NFR BLD AUTO: 0 % — SIGNIFICANT CHANGE UP (ref 0–2)
BILIRUB SERPL-MCNC: 0.5 MG/DL — SIGNIFICANT CHANGE UP (ref 0.2–1.2)
BUN SERPL-MCNC: 105 MG/DL — HIGH (ref 7–23)
CALCIUM SERPL-MCNC: 9.1 MG/DL — SIGNIFICANT CHANGE UP (ref 8.4–10.5)
CHLORIDE SERPL-SCNC: 101 MMOL/L — SIGNIFICANT CHANGE UP (ref 96–108)
CO2 SERPL-SCNC: 18 MMOL/L — LOW (ref 22–31)
CREAT SERPL-MCNC: 3.03 MG/DL — HIGH (ref 0.5–1.3)
EOSINOPHIL # BLD AUTO: 0 K/UL — SIGNIFICANT CHANGE UP (ref 0–0.5)
EOSINOPHIL NFR BLD AUTO: 0.3 % — SIGNIFICANT CHANGE UP (ref 0–6)
ERYTHROCYTE [SEDIMENTATION RATE] IN BLOOD: >140 MM/HR — SIGNIFICANT CHANGE UP (ref 0–20)
GLUCOSE SERPL-MCNC: 184 MG/DL — HIGH (ref 70–99)
HCT VFR BLD CALC: 27.8 % — LOW (ref 39–50)
HGB BLD-MCNC: 9.3 G/DL — LOW (ref 13–17)
INR BLD: 4.16 RATIO — HIGH (ref 0.88–1.16)
LYMPHOCYTES # BLD AUTO: 1.6 K/UL — SIGNIFICANT CHANGE UP (ref 1–3.3)
LYMPHOCYTES # BLD AUTO: 13.1 % — SIGNIFICANT CHANGE UP (ref 13–44)
MCHC RBC-ENTMCNC: 28.9 PG — SIGNIFICANT CHANGE UP (ref 27–34)
MCHC RBC-ENTMCNC: 33.7 GM/DL — SIGNIFICANT CHANGE UP (ref 32–36)
MCV RBC AUTO: 85.9 FL — SIGNIFICANT CHANGE UP (ref 80–100)
MONOCYTES # BLD AUTO: 1.2 K/UL — HIGH (ref 0–0.9)
MONOCYTES NFR BLD AUTO: 9.9 % — SIGNIFICANT CHANGE UP (ref 2–14)
NEUTROPHILS # BLD AUTO: 9.6 K/UL — HIGH (ref 1.8–7.4)
NEUTROPHILS NFR BLD AUTO: 76.7 % — SIGNIFICANT CHANGE UP (ref 43–77)
PLATELET # BLD AUTO: 328 K/UL — SIGNIFICANT CHANGE UP (ref 150–400)
POTASSIUM SERPL-MCNC: 4.5 MMOL/L — SIGNIFICANT CHANGE UP (ref 3.5–5.3)
POTASSIUM SERPL-SCNC: 4.5 MMOL/L — SIGNIFICANT CHANGE UP (ref 3.5–5.3)
PROT SERPL-MCNC: 6.7 G/DL — SIGNIFICANT CHANGE UP (ref 6–8.3)
PROTHROM AB SERPL-ACNC: 50 SEC — HIGH (ref 10–12.9)
RBC # BLD: 3.23 M/UL — LOW (ref 4.2–5.8)
RBC # FLD: 15.9 % — HIGH (ref 10.3–14.5)
SODIUM SERPL-SCNC: 140 MMOL/L — SIGNIFICANT CHANGE UP (ref 135–145)
URATE SERPL-MCNC: 8.2 MG/DL — SIGNIFICANT CHANGE UP (ref 3.4–8.8)
WBC # BLD: 12.4 K/UL — HIGH (ref 3.8–10.5)
WBC # FLD AUTO: 12.4 K/UL — HIGH (ref 3.8–10.5)

## 2019-07-02 PROCEDURE — 99285 EMERGENCY DEPT VISIT HI MDM: CPT

## 2019-07-02 PROCEDURE — 76770 US EXAM ABDO BACK WALL COMP: CPT

## 2019-07-02 PROCEDURE — 93971 EXTREMITY STUDY: CPT | Mod: 26

## 2019-07-02 PROCEDURE — 76770 US EXAM ABDO BACK WALL COMP: CPT | Mod: 26

## 2019-07-02 PROCEDURE — 73030 X-RAY EXAM OF SHOULDER: CPT | Mod: 26,RT

## 2019-07-02 RX ORDER — LOSARTAN POTASSIUM 25 MG/1
25 TABLET, FILM COATED ORAL DAILY
Qty: 30 | Refills: 0 | Status: ACTIVE | COMMUNITY
Start: 1900-01-01 | End: 1900-01-01

## 2019-07-02 RX ORDER — METOLAZONE 5 MG/1
5 TABLET ORAL
Qty: 14 | Refills: 0 | Status: ACTIVE | COMMUNITY
Start: 2019-07-02 | End: 1900-01-01

## 2019-07-02 RX ADMIN — Medication 100 MILLIGRAM(S): at 23:36

## 2019-07-02 RX ADMIN — ERYTHROPOIETIN 1 UNIT/ML: 20000 INJECTION, SOLUTION INTRAVENOUS; SUBCUTANEOUS at 00:00

## 2019-07-02 NOTE — ED ADULT TRIAGE NOTE - CHIEF COMPLAINT QUOTE
pt c/o painful edema associated with erythema and hot to touch to RUE worsening x yesterday.  pt sent by cardiology for further eval to r/o DVT vs cellulitis

## 2019-07-02 NOTE — ED PROVIDER NOTE - OBJECTIVE STATEMENT
89 yo M Afib, HTN, CHF, BPH, on coumadin, pw right hand swelling and pain. 1 month ago, 87 yo M Afib, HTN, CHF, BPH, on coumadin, pw right hand swelling and pain. 1 month ago, pt felt sharp pain in R shoulder when he was making his bed. Since then, he has limited the movement of R shoulder, and has noticed slowly worsening R arm swelling, most noticible in the R hand. This AM, pt's had was warm and red, and was advised by cardiologist to r/o cellulitis, thrombophlebitis, DVT. 89 yo M Afib, HTN, CHF, BPH, on coumadin, pw right hand swelling and pain. 1 month ago, pt felt sharp pain in R shoulder when he was making his bed. Since then, he has limited the movement of R shoulder, and has noticed slowly worsening R arm swelling, most notifiable in the R hand. This AM, pt's had was warm and red, and was advised by cardiologist to r/o cellulitis, thrombophlebitis, DVT.

## 2019-07-02 NOTE — ED PROVIDER NOTE - ATTENDING CONTRIBUTION TO CARE
Attending MD Soto: I personally have seen and examined this patient.  Resident note reviewed and agree on plan of care and except where noted.  See below for details.     Seen in [place]    88M with PMH/PSH including CHF, PPM, AFib on Coumadin, HTN, chronic renal insufficiency, BPH, gout presents to the ED with RUE swelling for one month.  Reports a month ago felt a sharp pain in R shoulder while making bed.  Since then, limited ROM of R shoulder, reports cramping pain.  Reports since then has been having worsening RUE pain, swelling, most notably at R hand.  Reports decided to come in today because noted hand warmth and erythema and was sent in by cardiologist.  Denies previous episode.  Denies chest pain, shortness of breath, palpitations.  Denies abdominal pain, nausea, vomiting, diarrhea, blood in stools. Denies fevers, chills.  Denies dysuria, hematuria, change in urinary habits including frequency, urgency. On exam, NAD, head NCAT, PERRL, FROM at neck, no tenderness to midline palpation, no stepoffs along length of spine, lungs CTAB with good inspiratory effort, +S1S2, +3/6 holosystolic murmur, no r/g, abdomen soft with +BS, NT, ND, no CVAT, RUE edema, limited ROM secondary to pain, erythema, cap refill <2s, +radial pulse, bilateral lower extremity edema, no erythema, warmth, calf tenderness; A/P: 88M with RUE pain, swelling, erythema, Ddx includes cellulitis vs RUE DVT, doubt septic arthritis, will obtain US RUE, shoulder XR although low suspicion, labs, IV abx, likely admit

## 2019-07-02 NOTE — ED STATDOCS - OBJECTIVE STATEMENT
89 y/o M w pmhx of CHF, HTN, murmur, a-fib, pacemaker in place (on coumadin) p/w right hand pain, edema, erythema and warmth x4days since Saturday 6/29.  Pt sent by cardiology to r/o DVT  vs cellulitis. Per daughter, pt went to lift up his arms 3 weeks ago, since then c/o pain in r shoulder and decreased mobility. No associated injury or trauma. Denies fevers, or other complaints. NKDA. 89 y/o M w pmhx of CHF, HTN, murmur, a-fib, pacemaker in place (on coumadin) p/w worsening right hand pain, edema, erythema and warmth x5days since Friday 6/28.  Per daughter, pt seen at Urgent care at that time. Today,  pt went for US of kidneys, sent by cardiology to r/o DVT  vs cellulitis. Daughter also states pt went to lift up his arms 3 weeks ago, since then c/o pain in r shoulder and decreased mobility. No associated injury or trauma. Denies fevers, or other complaints. NKDA.

## 2019-07-02 NOTE — ED STATDOCS - ATTENDING CONTRIBUTION TO CARE
Sammy Seymour MD, FACEP has not developed a patient physician relationship with this patient. This physician has not personally seen or examined this patient, has not participated in the care of this patient, has not reviewed clinical information, history, physical exam, or the ACP/resident's plan. The note was assigned to this physician as a STAT doc note and this physician was available for questioning if necessary, however, the patient was seen, treated and all elements of care and clinical pathway were decided by another physician on this visit.

## 2019-07-02 NOTE — ED ADULT NURSE NOTE - OBJECTIVE STATEMENT
89 y/o male A&Ox3 on coumadin presents to ED for right arm pain/swelling and r/o "DVT" as per PMD. Daughter reports that pt lifted something up 3 weeks ago and felt like they "tore" something, decreased ROM since then, worsening swelling and redness. Right hand and wrist +3 pitting edema, tenderness to touch, skin warm to touch, no noted bleeding or deformity. B/l breath sounds clear, non labored respirations, no SOB, cough or cyanosis noted.  +1 pitting edema to b/l lower extremities, +flexion/extension, +sensation to touch. Abdomen soft, non tender and non distended, no dysuria or blood in urine or stool. No weakness, numbness or tingling. Denies fever, chill, n/v/d. Safety measures maintained with family at bedside. 87 y/o male A&Ox3 on coumadin presents to ED for right arm pain/swelling and r/o "DVT" as per PMD. Daughter reports that pt lifted something up 3 weeks ago and felt like they "tore" something, decreased ROM since then, worsening swelling and redness. Right hand and wrist +3 pitting edema, tenderness to touch, skin warm to touch, inability to  hand because of pain, no noted bleeding or deformity. B/l breath sounds clear, non labored respirations, no SOB, cough or cyanosis noted.  +1 pitting edema to b/l lower extremities, +flexion/extension, +sensation to touch. Abdomen soft, non tender and non distended, no dysuria or blood in urine or stool. No weakness, numbness or tingling. Denies fever, chill, n/v/d. Safety measures maintained with family at bedside.

## 2019-07-02 NOTE — ED PROVIDER NOTE - CLINICAL SUMMARY MEDICAL DECISION MAKING FREE TEXT BOX
88 M pw R shoulder pain, R arm and hand swelling and erythema. DVT, vs cellulitis, vs thrombophlebitis. duplex, xrays, labs +/- abx per lab results. Reassess for dispo.

## 2019-07-02 NOTE — ED PROVIDER NOTE - NS ED ROS FT
GENERAL: No fever, chills  EYES: no vision changes, no discharge.   HEENT: no difficulty swallowing or speaking   CARDIAC: no chest pain/pressure, SOB, lower ex edema  PULMONARY: no cough, SOB  GI: no abdominal pain, n/v/d/c  : no dysuria, frequency, hematuria  SKIN: + right hand swelling, erythema, warmth.   NEURO: no headache, lightheadedness, paraesthesias.   MSK: No joint pain, myalgia, weakness. + right shoulder pain, limited movement, + right forearm and hand swelling.

## 2019-07-02 NOTE — ED STATDOCS - NS_EDPROVIDERDISPOUSERTYPE_ED_A_ED
Scribe Attestation (For Scribes USE Only)... I have personally evaluated and examined the patient. The Attending was available to me as a supervising provider if needed./Scribe Attestation (For Scribes USE Only)... Attending Attestation (For Attendings USE Only).../Scribe Attestation (For Scribes USE Only).../I have personally evaluated and examined the patient. The Attending was available to me as a supervising provider if needed.

## 2019-07-02 NOTE — ED PROVIDER NOTE - PHYSICAL EXAMINATION
General: Patient awake alert NAD.   HEENT: normocephalic, atraumatic, EMOI, no scleral icterus.   Cardiac: + 3/6 systolic murmur. RRR, no rubs, no gallop.   LUNGS: CTA B/L no wheeze, rhonchi, rales.   Abdomen: soft NT, ND, no rebound no guarding.  EXT: +R shoulder limited passive and active ROM. + edema/edema/warmth extending from dorsal hand to past elbow.   Neuro: A&Ox3, gait normal, no focal neurological deficits, CN 2-12 grossly intact  Skin: see above EXT.

## 2019-07-02 NOTE — ED ADULT NURSE NOTE - CHPI ED NUR SYMPTOMS NEG
no fever/no decreased eating/drinking/no tingling/no dizziness/no vomiting/no chills/no nausea/no pain/no weakness

## 2019-07-03 DIAGNOSIS — M10.9 GOUT, UNSPECIFIED: ICD-10-CM

## 2019-07-03 DIAGNOSIS — Z29.9 ENCOUNTER FOR PROPHYLACTIC MEASURES, UNSPECIFIED: ICD-10-CM

## 2019-07-03 DIAGNOSIS — M25.519 PAIN IN UNSPECIFIED SHOULDER: ICD-10-CM

## 2019-07-03 DIAGNOSIS — L03.90 CELLULITIS, UNSPECIFIED: ICD-10-CM

## 2019-07-03 DIAGNOSIS — N18.4 CHRONIC KIDNEY DISEASE, STAGE 4 (SEVERE): ICD-10-CM

## 2019-07-03 DIAGNOSIS — I50.9 HEART FAILURE, UNSPECIFIED: ICD-10-CM

## 2019-07-03 DIAGNOSIS — I48.91 UNSPECIFIED ATRIAL FIBRILLATION: ICD-10-CM

## 2019-07-03 DIAGNOSIS — R21 RASH AND OTHER NONSPECIFIC SKIN ERUPTION: ICD-10-CM

## 2019-07-03 DIAGNOSIS — N40.0 BENIGN PROSTATIC HYPERPLASIA WITHOUT LOWER URINARY TRACT SYMPTOMS: ICD-10-CM

## 2019-07-03 DIAGNOSIS — I10 ESSENTIAL (PRIMARY) HYPERTENSION: ICD-10-CM

## 2019-07-03 LAB
ANION GAP SERPL CALC-SCNC: 15 MMOL/L — SIGNIFICANT CHANGE UP (ref 5–17)
BASOPHILS # BLD AUTO: 0.02 K/UL — SIGNIFICANT CHANGE UP (ref 0–0.2)
BASOPHILS NFR BLD AUTO: 0.2 % — SIGNIFICANT CHANGE UP (ref 0–2)
BLD GP AB SCN SERPL QL: NEGATIVE — SIGNIFICANT CHANGE UP
BUN SERPL-MCNC: 99 MG/DL — HIGH (ref 7–23)
CALCIUM SERPL-MCNC: 8.2 MG/DL — LOW (ref 8.4–10.5)
CHLORIDE SERPL-SCNC: 102 MMOL/L — SIGNIFICANT CHANGE UP (ref 96–108)
CO2 SERPL-SCNC: 23 MMOL/L — SIGNIFICANT CHANGE UP (ref 22–31)
CREAT SERPL-MCNC: 3.13 MG/DL — HIGH (ref 0.5–1.3)
CRP SERPL-MCNC: 4.63 MG/DL — HIGH (ref 0–0.4)
EOSINOPHIL # BLD AUTO: 0.02 K/UL — SIGNIFICANT CHANGE UP (ref 0–0.5)
EOSINOPHIL NFR BLD AUTO: 0.2 % — SIGNIFICANT CHANGE UP (ref 0–6)
GLUCOSE SERPL-MCNC: 294 MG/DL — HIGH (ref 70–99)
HCT VFR BLD CALC: 24.9 % — LOW (ref 39–50)
HCT VFR BLD CALC: 26.6 % — LOW (ref 39–50)
HGB BLD-MCNC: 7.8 G/DL — LOW (ref 13–17)
HGB BLD-MCNC: 9.1 G/DL — LOW (ref 13–17)
IMM GRANULOCYTES NFR BLD AUTO: 1.1 % — SIGNIFICANT CHANGE UP (ref 0–1.5)
INR BLD: 5.29 RATIO — CRITICAL HIGH (ref 0.88–1.16)
LYMPHOCYTES # BLD AUTO: 0.73 K/UL — LOW (ref 1–3.3)
LYMPHOCYTES # BLD AUTO: 8.2 % — LOW (ref 13–44)
MAGNESIUM SERPL-MCNC: 1.7 MG/DL — SIGNIFICANT CHANGE UP (ref 1.6–2.6)
MCHC RBC-ENTMCNC: 27.8 PG — SIGNIFICANT CHANGE UP (ref 27–34)
MCHC RBC-ENTMCNC: 29 PG — SIGNIFICANT CHANGE UP (ref 27–34)
MCHC RBC-ENTMCNC: 31.3 GM/DL — LOW (ref 32–36)
MCHC RBC-ENTMCNC: 34.1 GM/DL — SIGNIFICANT CHANGE UP (ref 32–36)
MCV RBC AUTO: 85 FL — SIGNIFICANT CHANGE UP (ref 80–100)
MCV RBC AUTO: 88.6 FL — SIGNIFICANT CHANGE UP (ref 80–100)
MONOCYTES # BLD AUTO: 0.8 K/UL — SIGNIFICANT CHANGE UP (ref 0–0.9)
MONOCYTES NFR BLD AUTO: 9 % — SIGNIFICANT CHANGE UP (ref 2–14)
NEUTROPHILS # BLD AUTO: 7.26 K/UL — SIGNIFICANT CHANGE UP (ref 1.8–7.4)
NEUTROPHILS NFR BLD AUTO: 81.3 % — HIGH (ref 43–77)
PHOSPHATE SERPL-MCNC: 4 MG/DL — SIGNIFICANT CHANGE UP (ref 2.5–4.5)
PLATELET # BLD AUTO: 286 K/UL — SIGNIFICANT CHANGE UP (ref 150–400)
PLATELET # BLD AUTO: 290 K/UL — SIGNIFICANT CHANGE UP (ref 150–400)
POTASSIUM SERPL-MCNC: 3.4 MMOL/L — LOW (ref 3.5–5.3)
POTASSIUM SERPL-SCNC: 3.4 MMOL/L — LOW (ref 3.5–5.3)
PROTHROM AB SERPL-ACNC: 63.5 SEC — HIGH (ref 10–13.1)
RBC # BLD: 2.81 M/UL — LOW (ref 4.2–5.8)
RBC # BLD: 3.13 M/UL — LOW (ref 4.2–5.8)
RBC # FLD: 15.3 % — HIGH (ref 10.3–14.5)
RBC # FLD: 15.9 % — HIGH (ref 10.3–14.5)
RH IG SCN BLD-IMP: POSITIVE — SIGNIFICANT CHANGE UP
SODIUM SERPL-SCNC: 140 MMOL/L — SIGNIFICANT CHANGE UP (ref 135–145)
WBC # BLD: 10.4 K/UL — SIGNIFICANT CHANGE UP (ref 3.8–10.5)
WBC # BLD: 8.93 K/UL — SIGNIFICANT CHANGE UP (ref 3.8–10.5)
WBC # FLD AUTO: 10.4 K/UL — SIGNIFICANT CHANGE UP (ref 3.8–10.5)
WBC # FLD AUTO: 8.93 K/UL — SIGNIFICANT CHANGE UP (ref 3.8–10.5)

## 2019-07-03 PROCEDURE — 12345: CPT | Mod: NC

## 2019-07-03 PROCEDURE — 99223 1ST HOSP IP/OBS HIGH 75: CPT | Mod: GC

## 2019-07-03 RX ORDER — ACETAMINOPHEN 500 MG
650 TABLET ORAL EVERY 4 HOURS
Refills: 0 | Status: DISCONTINUED | OUTPATIENT
Start: 2019-07-03 | End: 2019-07-08

## 2019-07-03 RX ORDER — HYDRALAZINE HCL 50 MG
10 TABLET ORAL EVERY 8 HOURS
Refills: 0 | Status: DISCONTINUED | OUTPATIENT
Start: 2019-07-03 | End: 2019-07-10

## 2019-07-03 RX ORDER — LOSARTAN POTASSIUM 100 MG/1
25 TABLET, FILM COATED ORAL DAILY
Refills: 0 | Status: DISCONTINUED | OUTPATIENT
Start: 2019-07-03 | End: 2019-07-08

## 2019-07-03 RX ORDER — CEFTRIAXONE 500 MG/1
INJECTION, POWDER, FOR SOLUTION INTRAMUSCULAR; INTRAVENOUS
Refills: 0 | Status: DISCONTINUED | OUTPATIENT
Start: 2019-07-03 | End: 2019-07-09

## 2019-07-03 RX ORDER — METOPROLOL TARTRATE 50 MG
25 TABLET ORAL
Refills: 0 | Status: DISCONTINUED | OUTPATIENT
Start: 2019-07-03 | End: 2019-07-10

## 2019-07-03 RX ORDER — LACTOBACILLUS ACIDOPHILUS 100MM CELL
1 CAPSULE ORAL ONCE
Refills: 0 | Status: COMPLETED | OUTPATIENT
Start: 2019-07-03 | End: 2019-07-03

## 2019-07-03 RX ORDER — VANCOMYCIN HCL 1 G
1000 VIAL (EA) INTRAVENOUS ONCE
Refills: 0 | Status: COMPLETED | OUTPATIENT
Start: 2019-07-03 | End: 2019-07-03

## 2019-07-03 RX ORDER — CEFAZOLIN SODIUM 1 G
1000 VIAL (EA) INJECTION ONCE
Refills: 0 | Status: COMPLETED | OUTPATIENT
Start: 2019-07-03 | End: 2019-07-03

## 2019-07-03 RX ORDER — LACTOBACILLUS ACIDOPH-L.BULGARICUS 1 MILLION CELL CHEWABLE TABLET 1MM CELL
1 TABLET,CHEWABLE ORAL DAILY
Refills: 0 | Status: DISCONTINUED | OUTPATIENT
Start: 2019-07-03 | End: 2019-07-10

## 2019-07-03 RX ORDER — CEFAZOLIN SODIUM 1 G
1000 VIAL (EA) INJECTION EVERY 12 HOURS
Refills: 0 | Status: DISCONTINUED | OUTPATIENT
Start: 2019-07-03 | End: 2019-07-03

## 2019-07-03 RX ORDER — FINASTERIDE 5 MG/1
5 TABLET, FILM COATED ORAL DAILY
Refills: 0 | Status: DISCONTINUED | OUTPATIENT
Start: 2019-07-03 | End: 2019-07-10

## 2019-07-03 RX ORDER — CEFTRIAXONE 500 MG/1
1000 INJECTION, POWDER, FOR SOLUTION INTRAMUSCULAR; INTRAVENOUS EVERY 24 HOURS
Refills: 0 | Status: DISCONTINUED | OUTPATIENT
Start: 2019-07-04 | End: 2019-07-09

## 2019-07-03 RX ORDER — CEFTRIAXONE 500 MG/1
1000 INJECTION, POWDER, FOR SOLUTION INTRAMUSCULAR; INTRAVENOUS ONCE
Refills: 0 | Status: COMPLETED | OUTPATIENT
Start: 2019-07-03 | End: 2019-07-03

## 2019-07-03 RX ADMIN — Medication 650 MILLIGRAM(S): at 06:00

## 2019-07-03 RX ADMIN — Medication 100 MILLIGRAM(S): at 00:30

## 2019-07-03 RX ADMIN — Medication 250 MILLIGRAM(S): at 05:45

## 2019-07-03 RX ADMIN — CEFTRIAXONE 100 MILLIGRAM(S): 500 INJECTION, POWDER, FOR SOLUTION INTRAMUSCULAR; INTRAVENOUS at 05:16

## 2019-07-03 RX ADMIN — Medication 650 MILLIGRAM(S): at 05:54

## 2019-07-03 RX ADMIN — Medication 10 MILLIGRAM(S): at 14:48

## 2019-07-03 RX ADMIN — Medication 10 MILLIGRAM(S): at 22:08

## 2019-07-03 RX ADMIN — LACTOBACILLUS ACIDOPH-L.BULGARICUS 1 MILLION CELL CHEWABLE TABLET 1 TABLET(S): at 14:48

## 2019-07-03 RX ADMIN — Medication 10 MILLIGRAM(S): at 05:58

## 2019-07-03 RX ADMIN — LOSARTAN POTASSIUM 25 MILLIGRAM(S): 100 TABLET, FILM COATED ORAL at 05:58

## 2019-07-03 RX ADMIN — Medication 25 MILLIGRAM(S): at 17:13

## 2019-07-03 RX ADMIN — Medication 25 MILLIGRAM(S): at 06:01

## 2019-07-03 RX ADMIN — FINASTERIDE 5 MILLIGRAM(S): 5 TABLET, FILM COATED ORAL at 12:36

## 2019-07-03 RX ADMIN — Medication 1 TABLET(S): at 00:39

## 2019-07-03 NOTE — H&P ADULT - ASSESSMENT
87yo M w/ PMH HTN, CHF s/p PPM, afib on Coumadin, chronic renal insufficiency, BPH, gout presenting with 1 month of R shoulder pain and R arm swelling and 2 weeks of R arm erythema. 89yo M w/ PMH HTN, CHF s/p PPM, afib on Coumadin, CKD, BPH, gout presenting with 1 month of R shoulder pain and R arm swelling and 2 weeks of R arm erythema concerning for cellulitis vs thrombophlebitis.

## 2019-07-03 NOTE — ED ADULT NURSE REASSESSMENT NOTE - NS ED NURSE REASSESS COMMENT FT1
Report received from PATRICE Bennett. Pt resting comfortably. In no acute distress. Pt aware of admitting status. Pending bed assignment upstairs.

## 2019-07-03 NOTE — H&P ADULT - NSHPPHYSICALEXAM_GEN_ALL_CORE
Vital Signs Last 24 Hrs  T(C): 36.4 (07-03-19 @ 02:03), Max: 37 (07-02-19 @ 20:29)  T(F): 97.5 (07-03-19 @ 02:03), Max: 98.6 (07-02-19 @ 20:29)  HR: 78 (07-03-19 @ 02:03) (63 - 80)  BP: 123/73 (07-03-19 @ 02:03) (117/63 - 134/67)  BP(mean): --  RR: 16 (07-03-19 @ 02:03) (16 - 18)  SpO2: 98% (07-03-19 @ 02:03) (98% - 100%)    Physical Exam:  Gen: Alert, NAD  HEENT: NCAT, PERRL, EOMI, clear conjunctiva, no scleral icterus, no erythema or exudates in the oropharynx, mmm  Neck: Supple, no JVD, no LAD  CV: irregularly irregular rhythm, S1S2, systolic murmur  Resp: CTAB, normal respiratory effort  Abd: Soft, NT, ND, normal bowel sounds  Ext: diffuse RUE edema and patchy erythema, no drainage or fluctuance, + peripheral pulses, R hand appears well perfused with brisk capillary refill, no whitening or cyanosis of extremity; b/l hand tophi; b/l LE edema overall stable to improved from prior  Neuro: AOx3, CN2-12 grossly intact, WYNNE  Skin: patchy R arm erythema, no obvious skin breakdown or excoriation Vital Signs Last 24 Hrs  T(C): 36.4 (07-03-19 @ 02:03), Max: 37 (07-02-19 @ 20:29)  T(F): 97.5 (07-03-19 @ 02:03), Max: 98.6 (07-02-19 @ 20:29)  HR: 78 (07-03-19 @ 02:03) (63 - 80)  BP: 123/73 (07-03-19 @ 02:03) (117/63 - 134/67)  BP(mean): --  RR: 16 (07-03-19 @ 02:03) (16 - 18)  SpO2: 98% (07-03-19 @ 02:03) (98% - 100%)    Physical Exam:  Gen: Alert, NAD  HEENT: NCAT, PERRL, EOMI, clear conjunctiva, no scleral icterus, no erythema or exudates in the oropharynx, mmm  Neck: Supple, no JVD, no LAD  CV: irregularly irregular rhythm, S1S2, systolic murmur  Resp: CTAB, normal respiratory effort  Abd: Soft, NT, ND, normal bowel sounds  Ext: diffuse RUE edema and patchy erythema, no drainage or fluctuance, + peripheral pulses, R hand appears well perfused with brisk capillary refill, no whitening or cyanosis of extremity; b/l hand tophi; b/l LE edema overall stable to improved from prior  Neuro: AOx3, CN2-12 grossly intact, WYNNE, pain limited ROM of RUE  Skin: patchy R arm erythema, no obvious skin breakdown or excoriation

## 2019-07-03 NOTE — H&P ADULT - PROBLEM SELECTOR PLAN 3
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

## 2019-07-03 NOTE — H&P ADULT - NSHPSOCIALHISTORY_GEN_ALL_CORE
Lives at home, IADLs but has been getting help from his daughter because of difficulty using R arm and hand due to swelling, nonsmoker, non drinker. Lives at home independently, IADLs but has been getting help from his daughter because of difficulty using R arm and hand due to swelling, nonsmoker, non drinker.

## 2019-07-03 NOTE — H&P ADULT - NSHPLABSRESULTS_GEN_ALL_CORE
LABS:                          9.3    12.4  )-----------( 328      ( 02 Jul 2019 16:36 )             27.8       07-02    140  |  101  |  105<H>  ----------------------------<  184<H>  4.5   |  18<L>  |  3.03<H>    Ca    9.1      02 Jul 2019 16:36    TPro  6.7  /  Alb  3.4  /  TBili  0.5  /  DBili  x   /  AST  6<L>  /  ALT  6<L>  /  AlkPhos  64  07-02       LIVER FUNCTIONS - ( 02 Jul 2019 16:36 )  Alb: 3.4 g/dL / Pro: 6.7 g/dL / ALK PHOS: 64 U/L / ALT: 6 U/L / AST: 6 U/L / GGT: x                 PT/INR - ( 02 Jul 2019 16:36 )   PT: 50.0 sec;   INR: 4.16 ratio         PTT - ( 02 Jul 2019 16:36 )  PTT:49.2 sec    Lactate Trend        CAPILLARY BLOOD GLUCOSE        Culture Results:   <10,000 CFU/mL Normal Urogenital Wendi (06-08 @ 04:54)      RADIOLOGY & ADDITIONAL TESTS: Reviewed    < from: [facility] Duplex Upper Ext Vein Scan, Right (07.02.19 @ 19:43) >    IMPRESSION:     No evidence of right upper extremity deep venous thrombosis.    Dorsal right hand subcutaneous edema.    < end of copied text > LABS:                        9.3    12.4  )-----------( 328      ( 02 Jul 2019 16:36 )             27.8     07-02    140  |  101  |  105<H>  ----------------------------<  184<H>  4.5   |  18<L>  |  3.03<H>    Ca    9.1      02 Jul 2019 16:36    TPro  6.7  /  Alb  3.4  /  TBili  0.5  /  DBili  x   /  AST  6<L>  /  ALT  6<L>  /  AlkPhos  64  07-02       LIVER FUNCTIONS - ( 02 Jul 2019 16:36 )  Alb: 3.4 g/dL / Pro: 6.7 g/dL / ALK PHOS: 64 U/L / ALT: 6 U/L / AST: 6 U/L / GGT: x           PT/INR - ( 02 Jul 2019 16:36 )   PT: 50.0 sec;   INR: 4.16 ratio       PTT - ( 02 Jul 2019 16:36 )  PTT:49.2 sec    Lactate Trend    CAPILLARY BLOOD GLUCOSE    Culture Results:   <10,000 CFU/mL Normal Urogenital Wendi (06-08 @ 04:54)    RADIOLOGY & ADDITIONAL TESTS: Reviewed    < from: [facility] Duplex Upper Ext Vein Scan, Right (07.02.19 @ 19:43) >    IMPRESSION:   No evidence of right upper extremity deep venous thrombosis.    Dorsal right hand subcutaneous edema.  < end of copied text >    I have reviewed the labs and imaging.

## 2019-07-03 NOTE — H&P ADULT - PROBLEM SELECTOR PLAN 7
Hand tenderness likely 2/2 edema vs gout. Previously on febuxostat. Uric acid wnl.  - tylenol PRN, avoid nsaids in setting of CKD

## 2019-07-03 NOTE — ED ADULT NURSE REASSESSMENT NOTE - NS ED NURSE REASSESS COMMENT FT1
vss, am labs obtained and sent, pt had a sandwich overnight, am meds administered, pending bed assignment no complaints

## 2019-07-03 NOTE — CHART NOTE - NSCHARTNOTEFT_GEN_A_CORE
Patient seen and examined, H&P reviewed. Repeat CBC being drawn given drop in Hg and elevated INR. Holding coumadin. Cont empiric treatment for tenosynovitis with ceftriaxone; vancomycin dosed by level.

## 2019-07-03 NOTE — H&P ADULT - PROBLEM SELECTOR PLAN 1
R arm erythema, edema, warmth with elevated ESR/CRP suspicious for cellulitis. Unclear inciting factor. Afebrile, not meeting sepsis criteria. s/p clinda in ED.  - negative for RUE DVT  - low suspicion for staph  - c/w cefazolin for now, monitor for improvement  - f/u BCx in lab R arm erythema, edema, warmth with elevated ESR/CRP suspicious for cellulitis. Unclear inciting factor. Afebrile, not meeting sepsis criteria. s/p clinda in ED.  - negative for RUE DVT  - low suspicion for staph  - c/w cefazolin for now, monitor for improvement  - consider further imaging if no improvement  - f/u BCx in lab R arm erythema, edema, warmth with elevated ESR/CRP suspicious for cellulitis vs thrombophlebitis. Unclear inciting factor. No skin breakdown, no bites. Afebrile, not meeting sepsis criteria. s/p clinda in ED.  - negative for RUE DVT  - low suspicion for staph  - c/w cefazolin for now for soft tissue infection, monitor for improvement  - consider further imaging if no improvement  - f/u BCx in lab R arm erythema, edema, warmth with elevated leukocytosis, ESR/CRP suspicious for cellulitis vs thrombophlebitis. Unclear inciting factor. No skin breakdown, no bites. Afebrile, not meeting sepsis criteria. s/p clinda in ED.  - negative for RUE DVT  - low suspicion for staph  - c/w cefazolin for now for soft tissue infection, monitor for improvement  - consider further imaging and derm c/s if no improvement  - f/u BCx in lab R arm erythema, edema, warmth with elevated leukocytosis, ESR/CRP suspicious for cellulitis vs thrombophlebitis. Unclear inciting factor. No skin breakdown, no bites. Afebrile, not meeting sepsis criteria. s/p clinda in ED.  - negative for RUE DVT  - c/w empiric vanc by level and cefazolin for now for soft tissue infection, monitor for improvement  - consider further imaging and derm c/s if no improvement  - f/u BCx in lab R arm erythema, edema, warmth with elevated leukocytosis, ESR/CRP suspicious for cellulitis with possible component of tenosynovitis. Unclear inciting factor. No skin breakdown, no bites. Afebrile, not meeting sepsis criteria.   - negative for RUE DVT  - c/w empiric vanc by level and ceftriaxone for now to Rx possible tenosynovitis as well as broad coverage given recent admission. Consider de-escalation after culture results start to return if feasible.  -Will consider further imaging and additional specialty consultation if no improvement  - f/u BCx in lab

## 2019-07-03 NOTE — H&P ADULT - HISTORY OF PRESENT ILLNESS
87yo M w/ PMH HTN, CHF s/p PPM, afib on Coumadin, chronic renal insufficiency, BPH, gout presenting with R arm swelling. Patient recalls pulling on heavy bed sheets 1 month ago and then having R shoulder pain the next day which he’s never had in the past. The pain has been persistent over the past month but improved. The pain is there all day and was worse with movement. Describes is as a Julio horse-like cramping pain. Patient developed mild swelling along the whole entire R arm concurrent to onset of the R shoulder pain. Patient then developed redness in the whole arm about 2 weeks ago. The swelling got a lot worse over the past week. The patient also developed itching by the wrist a few days ago. Denies other changes during this time. Denies recent injuries or travels. No history of blood clots. Denies any other symptoms including fevers, chills, chest pain, sob, cough, runny nose, sore throat, headache, dizziness, abd pain, n/v/d, urinary symptoms, arthralgias.    In ED: T98.3 remained afebrile, HR 63, /63, RR 17, 98%RA. 89yo M w/ PMH HTN, CHF s/p PPM, afib on Coumadin, chronic renal insufficiency, BPH, gout presenting with R arm swelling. Patient recalls pulling on heavy bed sheets 1 month ago and then having R shoulder pain the next day which he’s never had in the past. The pain has been persistent over the past month but improved. The pain is there all day and was worse with movement. Describes is as a Julio horse-like cramping pain. Patient developed mild swelling along the whole entire R arm concurrent to onset of the R shoulder pain. Patient then developed redness in the whole arm about 2 weeks ago. The swelling got a lot worse over the past week. The patient also developed itching by the wrist a few days ago. Came to hospital today as pain was much worse in his R hand and limited ability to , especially with R thumb. Denies other changes during this time. Denies recent injuries or travels. No history of blood clots. Denies any other symptoms including fevers, chills, chest pain, sob, cough, runny nose, sore throat, headache, dizziness, abd pain, n/v/d, urinary symptoms, arthralgias. Denies any recent injury, PO steroids or steroid injections.    In ED: T98.3 remained afebrile, HR 63, /63, RR 17, 98%RA.

## 2019-07-03 NOTE — H&P ADULT - NSHPREVIEWOFSYSTEMS_GEN_ALL_CORE
General: No fevers, chills, fatigue, weight loss  HEENT: No headaches, acute visual changes, rhinorrhea, sore throat  CVS: No chest pain, palpitations  Resp: No cough, dyspnea, wheezing  GI: No abdominal pain, nausea, vomiting, constipation, diarrhea  : No dysuria, frequency, hematuria  MSK: + R arm/hand swelling, R hand pain  Ext: + b/l LE edema, no claudication  Skin: R arm erythema  Heme: No easy bruising or petechiae  Neuro: No confusion, numbness, weakness, or loss of consciousness  Endocrine: No excessive heat or cold symptoms, polyuria, polydipsia

## 2019-07-03 NOTE — H&P ADULT - ATTENDING COMMENTS
I have reviewed the labs and imaging. I have edited the above note as appropriate and agree with above unless otherwise indicated in my independent assessment below. 88M w/ hx of gout, s/p PPM, afib on coumadin p/w R arm pain consistent with cellulitis and possible component of tenosynovitis. Unclear if possible shoulder injury 4 weeks ago is related to current elbow and hand issue. Pt states his arm symptoms have improved somewhat after treatment in ER. Will Rx broadly given recent hospitalization and empirically Rx tenosynovitis. Can de-escalate if improving or as sensitivities start returning.  -Cont. IV ceftriaxone and Vanc by lvl for now  -Trend BMP, PT/INR daily  -Hold coumadin tonight  -Consider ortho consult in AM  -F/u BCx  -Tylenol PRN pain  -DVT PPx, on coumadin at home.

## 2019-07-04 DIAGNOSIS — L03.113 CELLULITIS OF RIGHT UPPER LIMB: ICD-10-CM

## 2019-07-04 LAB
ANION GAP SERPL CALC-SCNC: 17 MMOL/L — SIGNIFICANT CHANGE UP (ref 5–17)
BUN SERPL-MCNC: 98 MG/DL — HIGH (ref 7–23)
CALCIUM SERPL-MCNC: 8.9 MG/DL — SIGNIFICANT CHANGE UP (ref 8.4–10.5)
CHLORIDE SERPL-SCNC: 101 MMOL/L — SIGNIFICANT CHANGE UP (ref 96–108)
CO2 SERPL-SCNC: 23 MMOL/L — SIGNIFICANT CHANGE UP (ref 22–31)
CREAT SERPL-MCNC: 2.92 MG/DL — HIGH (ref 0.5–1.3)
GLUCOSE SERPL-MCNC: 122 MG/DL — HIGH (ref 70–99)
HCT VFR BLD CALC: 26.7 % — LOW (ref 39–50)
HGB BLD-MCNC: 8.3 G/DL — LOW (ref 13–17)
INR BLD: 5.4 RATIO — CRITICAL HIGH (ref 0.88–1.16)
INR BLD: 5.69 RATIO — CRITICAL HIGH (ref 0.88–1.16)
MAGNESIUM SERPL-MCNC: 1.8 MG/DL — SIGNIFICANT CHANGE UP (ref 1.6–2.6)
MCHC RBC-ENTMCNC: 27.3 PG — SIGNIFICANT CHANGE UP (ref 27–34)
MCHC RBC-ENTMCNC: 31.1 GM/DL — LOW (ref 32–36)
MCV RBC AUTO: 87.8 FL — SIGNIFICANT CHANGE UP (ref 80–100)
PHOSPHATE SERPL-MCNC: 4.4 MG/DL — SIGNIFICANT CHANGE UP (ref 2.5–4.5)
PLATELET # BLD AUTO: 313 K/UL — SIGNIFICANT CHANGE UP (ref 150–400)
POTASSIUM SERPL-MCNC: 4.1 MMOL/L — SIGNIFICANT CHANGE UP (ref 3.5–5.3)
POTASSIUM SERPL-SCNC: 4.1 MMOL/L — SIGNIFICANT CHANGE UP (ref 3.5–5.3)
PROTHROM AB SERPL-ACNC: 64.8 SEC — HIGH (ref 10–13.1)
PROTHROM AB SERPL-ACNC: 69 SEC — HIGH (ref 10–12.9)
RBC # BLD: 3.04 M/UL — LOW (ref 4.2–5.8)
RBC # FLD: 15.8 % — HIGH (ref 10.3–14.5)
SODIUM SERPL-SCNC: 141 MMOL/L — SIGNIFICANT CHANGE UP (ref 135–145)
VANCOMYCIN FLD-MCNC: 7.6 UG/ML — SIGNIFICANT CHANGE UP
WBC # BLD: 10.44 K/UL — SIGNIFICANT CHANGE UP (ref 3.8–10.5)
WBC # FLD AUTO: 10.44 K/UL — SIGNIFICANT CHANGE UP (ref 3.8–10.5)

## 2019-07-04 PROCEDURE — 99233 SBSQ HOSP IP/OBS HIGH 50: CPT

## 2019-07-04 RX ORDER — PHYTONADIONE (VIT K1) 5 MG
5 TABLET ORAL ONCE
Refills: 0 | Status: COMPLETED | OUTPATIENT
Start: 2019-07-04 | End: 2019-07-04

## 2019-07-04 RX ADMIN — FINASTERIDE 5 MILLIGRAM(S): 5 TABLET, FILM COATED ORAL at 12:04

## 2019-07-04 RX ADMIN — LACTOBACILLUS ACIDOPH-L.BULGARICUS 1 MILLION CELL CHEWABLE TABLET 1 TABLET(S): at 12:40

## 2019-07-04 RX ADMIN — Medication 5 MILLIGRAM(S): at 12:39

## 2019-07-04 RX ADMIN — Medication 10 MILLIGRAM(S): at 21:30

## 2019-07-04 RX ADMIN — Medication 10 MILLIGRAM(S): at 05:32

## 2019-07-04 RX ADMIN — LOSARTAN POTASSIUM 25 MILLIGRAM(S): 100 TABLET, FILM COATED ORAL at 05:32

## 2019-07-04 RX ADMIN — Medication 25 MILLIGRAM(S): at 17:30

## 2019-07-04 RX ADMIN — Medication 25 MILLIGRAM(S): at 05:32

## 2019-07-04 RX ADMIN — Medication 10 MILLIGRAM(S): at 13:09

## 2019-07-04 RX ADMIN — CEFTRIAXONE 100 MILLIGRAM(S): 500 INJECTION, POWDER, FOR SOLUTION INTRAMUSCULAR; INTRAVENOUS at 04:31

## 2019-07-04 NOTE — PROGRESS NOTE ADULT - PROBLEM SELECTOR PLAN 1
+ ESR/CRP cellulitis with possible component of tenosynovitis, Afebrile, not meeting sepsis criteria.   -  RUE DVT ruled out   - c/w empiric vanc by level and ceftriaxone   - elevate arm above heart level, d/w Pt and RN   - f/u BCx: NGTD

## 2019-07-05 LAB
ANION GAP SERPL CALC-SCNC: 16 MMOL/L — SIGNIFICANT CHANGE UP (ref 5–17)
BUN SERPL-MCNC: 96 MG/DL — HIGH (ref 7–23)
CALCIUM SERPL-MCNC: 8.7 MG/DL — SIGNIFICANT CHANGE UP (ref 8.4–10.5)
CHLORIDE SERPL-SCNC: 101 MMOL/L — SIGNIFICANT CHANGE UP (ref 96–108)
CO2 SERPL-SCNC: 20 MMOL/L — LOW (ref 22–31)
CREAT SERPL-MCNC: 3.15 MG/DL — HIGH (ref 0.5–1.3)
GLUCOSE SERPL-MCNC: 168 MG/DL — HIGH (ref 70–99)
HCT VFR BLD CALC: 27.2 % — LOW (ref 39–50)
HGB BLD-MCNC: 8.5 G/DL — LOW (ref 13–17)
INR BLD: 1.93 RATIO — HIGH (ref 0.88–1.16)
MCHC RBC-ENTMCNC: 27 PG — SIGNIFICANT CHANGE UP (ref 27–34)
MCHC RBC-ENTMCNC: 31.3 GM/DL — LOW (ref 32–36)
MCV RBC AUTO: 86.3 FL — SIGNIFICANT CHANGE UP (ref 80–100)
PLATELET # BLD AUTO: 332 K/UL — SIGNIFICANT CHANGE UP (ref 150–400)
POTASSIUM SERPL-MCNC: 3.9 MMOL/L — SIGNIFICANT CHANGE UP (ref 3.5–5.3)
POTASSIUM SERPL-SCNC: 3.9 MMOL/L — SIGNIFICANT CHANGE UP (ref 3.5–5.3)
PROTHROM AB SERPL-ACNC: 22.3 SEC — HIGH (ref 10–13.1)
RBC # BLD: 3.15 M/UL — LOW (ref 4.2–5.8)
RBC # FLD: 15.9 % — HIGH (ref 10.3–14.5)
SODIUM SERPL-SCNC: 137 MMOL/L — SIGNIFICANT CHANGE UP (ref 135–145)
WBC # BLD: 11.61 K/UL — HIGH (ref 3.8–10.5)
WBC # FLD AUTO: 11.61 K/UL — HIGH (ref 3.8–10.5)

## 2019-07-05 PROCEDURE — 99222 1ST HOSP IP/OBS MODERATE 55: CPT | Mod: GC

## 2019-07-05 PROCEDURE — 99233 SBSQ HOSP IP/OBS HIGH 50: CPT

## 2019-07-05 RX ORDER — VANCOMYCIN HCL 1 G
1000 VIAL (EA) INTRAVENOUS ONCE
Refills: 0 | Status: COMPLETED | OUTPATIENT
Start: 2019-07-05 | End: 2019-07-05

## 2019-07-05 RX ORDER — DOCUSATE SODIUM 100 MG
100 CAPSULE ORAL THREE TIMES A DAY
Refills: 0 | Status: DISCONTINUED | OUTPATIENT
Start: 2019-07-05 | End: 2019-07-08

## 2019-07-05 RX ORDER — WARFARIN SODIUM 2.5 MG/1
2.5 TABLET ORAL ONCE
Refills: 0 | Status: COMPLETED | OUTPATIENT
Start: 2019-07-05 | End: 2019-07-05

## 2019-07-05 RX ORDER — POLYETHYLENE GLYCOL 3350 17 G/17G
17 POWDER, FOR SOLUTION ORAL DAILY
Refills: 0 | Status: DISCONTINUED | OUTPATIENT
Start: 2019-07-05 | End: 2019-07-08

## 2019-07-05 RX ADMIN — Medication 10 MILLIGRAM(S): at 21:36

## 2019-07-05 RX ADMIN — Medication 250 MILLIGRAM(S): at 14:14

## 2019-07-05 RX ADMIN — Medication 25 MILLIGRAM(S): at 17:46

## 2019-07-05 RX ADMIN — WARFARIN SODIUM 2.5 MILLIGRAM(S): 2.5 TABLET ORAL at 21:36

## 2019-07-05 RX ADMIN — LACTOBACILLUS ACIDOPH-L.BULGARICUS 1 MILLION CELL CHEWABLE TABLET 1 TABLET(S): at 13:33

## 2019-07-05 RX ADMIN — LOSARTAN POTASSIUM 25 MILLIGRAM(S): 100 TABLET, FILM COATED ORAL at 04:52

## 2019-07-05 RX ADMIN — FINASTERIDE 5 MILLIGRAM(S): 5 TABLET, FILM COATED ORAL at 12:40

## 2019-07-05 RX ADMIN — Medication 100 MILLIGRAM(S): at 21:36

## 2019-07-05 RX ADMIN — Medication 10 MILLIGRAM(S): at 13:38

## 2019-07-05 RX ADMIN — Medication 25 MILLIGRAM(S): at 04:51

## 2019-07-05 RX ADMIN — Medication 10 MILLIGRAM(S): at 04:51

## 2019-07-05 RX ADMIN — CEFTRIAXONE 100 MILLIGRAM(S): 500 INJECTION, POWDER, FOR SOLUTION INTRAMUSCULAR; INTRAVENOUS at 04:51

## 2019-07-05 RX ADMIN — POLYETHYLENE GLYCOL 3350 17 GRAM(S): 17 POWDER, FOR SOLUTION ORAL at 12:40

## 2019-07-05 RX ADMIN — Medication 100 MILLIGRAM(S): at 13:34

## 2019-07-05 NOTE — CONSULT NOTE ADULT - SUBJECTIVE AND OBJECTIVE BOX
Patient is a 88y old  Male who presents with a chief complaint of RUE swelling and erythema (05 Jul 2019 12:09)    HPI:  89yo M w/ PMH HTN, CHF s/p PPM, afib on Coumadin, chronic renal insufficiency, BPH, gout presenting with R arm swelling. Patient recalls pulling on heavy bed sheets 1 month ago and then having R shoulder pain the next day which he’s never had in the past. The pain has been persistent over the past month but improved. The pain is there all day and was worse with movement. Describes is as a Julio horse-like cramping pain. Patient developed mild swelling along the whole entire R arm concurrent to onset of the R shoulder pain. Patient then developed redness in the whole arm about 2 weeks ago. The swelling got a lot worse over the past week. The patient also developed itching by the wrist a few days ago. Came to hospital today as pain was much worse in his R hand and limited ability to , especially with R thumb. Denies other changes during this time. Denies recent injuries or travels. No history of blood clots. Denies any other symptoms including fevers, chills, chest pain, sob, cough, runny nose, sore throat, headache, dizziness, abd pain, n/v/d, urinary symptoms, arthralgias. Denies any recent injury, PO steroids or steroid injections.    In ED: T98.3 remained afebrile, HR 63, /63, RR 17, 98%RA. (03 Jul 2019 03:00). Pt was treated initially with one dose clincadmycin, one dose cefazolin, one dose vancomycin, and then continued on ceftriaxone with no improvement in erythema. Leukocytosis improved initially, then bumped back up over the last day. RUE US r/o DVT. ID consulted for minimal improvement and persistent leukocytosis.     prior hospital charts reviewed [  ]  primary team notes reviewed [  ]  other consultant notes reviewed [  ]  PAST MEDICAL & SURGICAL HISTORY:  Congestive heart failure  HTN (hypertension)  Atrial fibrillation  No significant past surgical history    Allergies  No Known Allergies    ANTIMICROBIALS (past 90 days)  MEDICATIONS  (STANDING):  ceFAZolin   IVPB   100 mL/Hr IV Intermittent (07-03-19 @ 00:30)    cefTRIAXone   IVPB   100 mL/Hr IV Intermittent (07-03-19 @ 05:16)    cefTRIAXone   IVPB   100 mL/Hr IV Intermittent (07-05-19 @ 04:51)   100 mL/Hr IV Intermittent (07-04-19 @ 04:31)    clindamycin IVPB   100 mL/Hr IV Intermittent (07-02-19 @ 23:36)    vancomycin  IVPB   250 mL/Hr IV Intermittent (07-03-19 @ 05:45)      ANTIMICROBIALS:    cefTRIAXone   IVPB    cefTRIAXone   IVPB 1000 every 24 hours  vancomycin  IVPB 1000 once    OTHER MEDS: MEDICATIONS  (STANDING):  acetaminophen   Tablet .. 650 every 4 hours PRN  docusate sodium 100 three times a day  finasteride 5 daily  hydrALAZINE 10 every 8 hours  losartan 25 daily  metoprolol tartrate 25 two times a day  polyethylene glycol 3350 17 daily  warfarin 2.5 once    SOCIAL HISTORY:   hx smoking  non-smoker    FAMILY HISTORY:  No pertinent family history in first degree relatives    REVIEW OF SYSTEMS  [  ] ROS unobtainable because:    [  ] All other systems negative except as noted below:	    Constitutional:  [ ] fever [ ] chills  [ ] weight loss  [ ] weakness  Skin:  [ ] rash [ ] phlebitis	  Eyes: [ ] icterus [ ] pain  [ ] discharge	  ENMT: [ ] sore throat  [ ] thrush [ ] ulcers [ ] exudates  Respiratory: [ ] dyspnea [ ] hemoptysis [ ] cough [ ] sputum	  Cardiovascular:  [ ] chest pain [ ] palpitations [ ] edema	  Gastrointestinal:  [ ] nausea [ ] vomiting [ ] diarrhea [ ] constipation [ ] pain	  Genitourinary:  [ ] dysuria [ ] frequency [ ] hematuria [ ] discharge [ ] flank pain  [ ] incontinence  Musculoskeletal:  [ ] myalgias [ ] arthralgias [ ] arthritis  [ ] back pain  Neurological:  [ ] headache [ ] seizures  [ ] confusion/altered mental status  Psychiatric:  [ ] anxiety [ ] depression	  Hematology/Lymphatics:  [ ] lymphadenopathy  Endocrine:  [ ] adrenal [ ] thyroid  Allergic/Immunologic:	 [ ] transplant [ ] seasonal    Vital Signs Last 24 Hrs  T(F): 98.3 (07-05-19 @ 09:18), Max: 100 (07-03-19 @ 05:45)  Vital Signs Last 24 Hrs  HR: 81 (07-05-19 @ 13:36) (69 - 88)  BP: 123/59 (07-05-19 @ 13:36) (109/71 - 145/72)  RR: 18 (07-05-19 @ 09:18)  SpO2: 98% (07-05-19 @ 09:18) (98% - 100%)  Wt(kg): --    PHYSICAL EXAM:  General: non-toxic  HEAD/EYES: anicteric, PERRL  ENT:  supple  Cardiovascular:   S1, S2  Respiratory:  clear bilaterally  GI:  soft, non-tender, normal bowel sounds  :  no CVA tenderness   Musculoskeletal:  no synovitis  Neurologic:  grossly non-focal  Skin:  no rash  Lymph: no lymphadenopathy  Psychiatric:  appropriate affect  Vascular:  no phlebitis                            8.5    11.61 )-----------( 332      ( 05 Jul 2019 09:00 )             27.2     07-05    137  |  101  |  96<H>  ----------------------------<  168<H>  3.9   |  20<L>  |  3.15<H>    Ca    8.7      05 Jul 2019 07:06  Phos  4.4     07-04  Mg     1.8     07-04      MICROBIOLOGY:Vancomycin Level, Random: 7.6 (07-04 @ 06:03)    Culture - Blood (collected 02 Jul 2019 22:14)  Source: .Blood  Preliminary Report (03 Jul 2019 23:01):    No growth to date.    Culture - Blood (collected 02 Jul 2019 22:14)  Source: .Blood  Preliminary Report (03 Jul 2019 23:01):    No growth to date.                  RADIOLOGY:  imaging below personally reviewed Patient is a 88y old  Male who presents with a chief complaint of RUE swelling and erythema (2019 12:09)    HPI:  87yo M w/ PMH HTN, CHF s/p PPM, afib on Coumadin, chronic renal insufficiency, BPH, gout presenting with R arm swelling of one months duration. As per patient, he wall pulling on his bed sheets 1 month ago after which point he developed a sharp pain in his shoulder and mid arm. He described the pain as cramping, like a "cheryl-horse." He soon developed swelling along the arm, erythema, tingling, and weakness. Patient reported to the hospital as the pain and weakness were worsening. He denies fever, chills, cough, frequency, dysuria, diarrhea, recent travel or sick contacts, tick or mosquito bites, rash, recent antibiotics use.    In ED: T98.3 remained afebrile, HR 63, /63, RR 17, 98%RA. (2019 03:00). Pt was treated initially with one dose clincadmycin, one dose cefazolin, one dose vancomycin, and then continued on ceftriaxone with no improvement in erythema. Leukocytosis improved initially, then bumped back up over the last day. RUE US r/o DVT. ID consulted for minimal improvement of erythema on antibiotics and persistent leukocytosis.     prior hospital charts reviewed [x  ]  primary team notes reviewed [x  ]  other consultant notes reviewed [  ]  PAST MEDICAL & SURGICAL HISTORY:  Congestive heart failure  HTN (hypertension)  Atrial fibrillation  No significant past surgical history    Allergies  No Known Allergies    ANTIMICROBIALS (past 90 days)  MEDICATIONS  (STANDING):  ceFAZolin   IVPB   100 mL/Hr IV Intermittent (19 @ 00:30)    cefTRIAXone   IVPB   100 mL/Hr IV Intermittent (19 @ 05:16)    cefTRIAXone   IVPB   100 mL/Hr IV Intermittent (19 @ 04:51)   100 mL/Hr IV Intermittent (19 @ 04:31)    clindamycin IVPB   100 mL/Hr IV Intermittent (19 @ 23:36)    vancomycin  IVPB   250 mL/Hr IV Intermittent (19 @ 05:45)      ANTIMICROBIALS:    cefTRIAXone   IVPB    cefTRIAXone   IVPB 1000 every 24 hours  vancomycin  IVPB 1000 once    OTHER MEDS: MEDICATIONS  (STANDING):  acetaminophen   Tablet .. 650 every 4 hours PRN  docusate sodium 100 three times a day  finasteride 5 daily  hydrALAZINE 10 every 8 hours  losartan 25 daily  metoprolol tartrate 25 two times a day  polyethylene glycol 3350 17 daily  warfarin 2.5 once    SOCIAL HISTORY:  non-smoker, does not drink alcohol or use recreational drugs. Retired .    FAMILY HISTORY:  No pertinent family history in first degree relatives, both patents  of natural causes.    REVIEW OF SYSTEMS  [  ] ROS unobtainable because:    [  ] All other systems negative except as noted below:	    Constitutional:  [ ] fever [ ] chills  [ ] weight loss  [ ] weakness  Skin:  [ ] rash [ ] phlebitis	  Eyes: [ ] icterus [ ] pain  [ ] discharge	  ENMT: [ ] sore throat  [ ] thrush [ ] ulcers [ ] exudates  Respiratory: [ ] dyspnea [ ] hemoptysis [ ] cough [ ] sputum	  Cardiovascular:  [ ] chest pain [ ] palpitations [ ] edema	  Gastrointestinal:  [ ] nausea [ ] vomiting [ ] diarrhea [ ] constipation [ ] pain	  Genitourinary:  [ ] dysuria [ ] frequency [ ] hematuria [ ] discharge [ ] flank pain  [ ] incontinence  Musculoskeletal:  [x ] myalgias [ ] arthralgias [ ] arthritis  [ ] back pain  Neurological:  [ ] headache [ ] seizures  [ ] confusion/altered mental status  Psychiatric:  [ ] anxiety [ ] depression	  Hematology/Lymphatics:  [ ] lymphadenopathy  Endocrine:  [ ] adrenal [ ] thyroid  Allergic/Immunologic:	 [ ] transplant [ ] seasonal    Vital Signs Last 24 Hrs  T(F): 98.3 (19 @ 09:18), Max: 100 (19 @ 05:45)  Vital Signs Last 24 Hrs  HR: 81 (19 @ 13:36) (69 - 88)  BP: 123/59 (19 @ 13:36) (109/71 - 145/72)  RR: 18 (19 @ 09:18)  SpO2: 98% (19 @ 09:18) (98% - 100%)  Wt(kg): --    PHYSICAL EXAM:  General: non-toxic  HEAD/EYES: anicteric, PERRL  ENT:  supple  Cardiovascular:  holosytolic murmur  Respiratory:  clear bilaterally  GI:  soft, non-tender, normal bowel sounds  :  no CVA tenderness   Musculoskeletal:  RUE non-pitting edema, no gross erythema, no incrased warmth  Neurologic:  RUE motion limited, particularly  strength  Skin:  no rash  Lymph: no lymphadenopathy  Psychiatric:  appropriate affect  Vascular:  no phlebitis                            8.5    11.61 )-----------( 332      ( 2019 09:00 )             27.2     07    137  |  101  |  96<H>  ----------------------------<  168<H>  3.9   |  20<L>  |  3.15<H>    Ca    8.7      2019 07:06  Phos  4.4       Mg     1.8           MICROBIOLOGY:Vancomycin Level, Random: 7.6 ( @ 06:03)    Culture - Blood (collected 2019 22:14)  Source: .Blood  Preliminary Report (2019 23:01):    No growth to date.    Culture - Blood (collected 2019 22:14)  Source: .Blood  Preliminary Report (2019 23:01):    No growth to date.                  RADIOLOGY:  < from: Xray Shoulder Axillary View, Right (19 @ 20:10) >  < from: [facility] Duplex Upper Ext Vein Scan, Right (19 @ 19:43) >  IMPRESSION:     No evidence of right upper extremity deep venous thrombosis.    Dorsal right hand subcutaneous edema.    < end of copied text >  < from: [facility] Duplex Upper Ext Vein Scan, Right (19 @ 19:43) >  IMPRESSION:     No evidence of right upper extremity deep venous thrombosis.    Dorsal right hand subcutaneous edema.    < end of copied text >      IMPRESSION:  No acute fracture or dislocation.      < end of copied text >  imaging below personally reviewed Patient is a 88y old  Male who presents with a chief complaint of RUE swelling and erythema (2019 12:09)    HPI:  89yo M w/ PMH HTN, CHF s/p PPM, afib on Coumadin, chronic renal insufficiency, BPH, gout presenting with R arm swelling of one months duration. As per patient, he wall pulling on his bed sheets 1 month ago after which point he developed a sharp pain in his shoulder and mid arm. He described the pain as cramping, like a "cheryl-horse." He soon developed swelling along the arm, erythema, tingling, and weakness. Patient reported to the hospital as the pain and weakness were worsening. He denies fever, chills, cough, frequency, dysuria, diarrhea, recent travel or sick contacts, tick or mosquito bites, rash, recent antibiotics use.    In ED: T98.3 remained afebrile, HR 63, /63, RR 17, 98%RA. (2019 03:00). Pt was treated initially with one dose clincadmycin, one dose cefazolin, one dose vancomycin, and then continued on ceftriaxone with no improvement in erythema. Leukocytosis improved initially, then bumped back up over the last day. RUE US r/o DVT. ID consulted for minimal improvement of erythema on antibiotics and persistent leukocytosis.     prior hospital charts reviewed [x  ]  primary team notes reviewed [x  ]  other consultant notes reviewed [  ]  PAST MEDICAL & SURGICAL HISTORY:  Congestive heart failure  HTN (hypertension)  Atrial fibrillation  No significant past surgical history    Allergies  No Known Allergies    ANTIMICROBIALS (past 90 days)  MEDICATIONS  (STANDING):  ceFAZolin   IVPB   100 mL/Hr IV Intermittent (19 @ 00:30)    cefTRIAXone   IVPB   100 mL/Hr IV Intermittent (19 @ 05:16)    cefTRIAXone   IVPB   100 mL/Hr IV Intermittent (19 @ 04:51)   100 mL/Hr IV Intermittent (19 @ 04:31)    clindamycin IVPB   100 mL/Hr IV Intermittent (19 @ 23:36)    vancomycin  IVPB   250 mL/Hr IV Intermittent (19 @ 05:45)      ANTIMICROBIALS:    cefTRIAXone   IVPB    cefTRIAXone   IVPB 1000 every 24 hours  vancomycin  IVPB 1000 once    OTHER MEDS: MEDICATIONS  (STANDING):  acetaminophen   Tablet .. 650 every 4 hours PRN  docusate sodium 100 three times a day  finasteride 5 daily  hydrALAZINE 10 every 8 hours  losartan 25 daily  metoprolol tartrate 25 two times a day  polyethylene glycol 3350 17 daily  warfarin 2.5 once    SOCIAL HISTORY:  non-smoker, does not drink alcohol or use recreational drugs. Retired .    FAMILY HISTORY:  No pertinent family history in first degree relatives, both patents  of natural causes.    REVIEW OF SYSTEMS  [  ] ROS unobtainable because:    [  ] All other systems negative except as noted below:	    Constitutional:  [ ] fever [ ] chills  [ ] weight loss  [ ] weakness  Skin:  [ ] rash [ ] phlebitis	  Eyes: [ ] icterus [ ] pain  [ ] discharge	  ENMT: [ ] sore throat  [ ] thrush [ ] ulcers [ ] exudates  Respiratory: [ ] dyspnea [ ] hemoptysis [ ] cough [ ] sputum	  Cardiovascular:  [ ] chest pain [ ] palpitations [ ] edema	  Gastrointestinal:  [ ] nausea [ ] vomiting [ ] diarrhea [ ] constipation [ ] pain	  Genitourinary:  [ ] dysuria [ ] frequency [ ] hematuria [ ] discharge [ ] flank pain  [ ] incontinence  Musculoskeletal:  [x ] myalgias [ ] arthralgias [ ] arthritis  [ ] back pain  Neurological:  [ ] headache [ ] seizures  [ ] confusion/altered mental status  Psychiatric:  [ ] anxiety [ ] depression	  Hematology/Lymphatics:  [ ] lymphadenopathy  Endocrine:  [ ] adrenal [ ] thyroid  Allergic/Immunologic:	 [ ] transplant [ ] seasonal    Vital Signs Last 24 Hrs  T(F): 98.3 (19 @ 09:18), Max: 100 (19 @ 05:45)  Vital Signs Last 24 Hrs  HR: 81 (19 @ 13:36) (69 - 88)  BP: 123/59 (19 @ 13:36) (109/71 - 145/72)  RR: 18 (19 @ 09:18)  SpO2: 98% (19 @ 09:18) (98% - 100%)  Wt(kg): --    PHYSICAL EXAM:  General: non-toxic  HEAD/EYES: anicteric, PERRL  ENT:  supple  Cardiovascular:  holosytolic murmur  Respiratory:  clear bilaterally  GI:  soft, non-tender, normal bowel sounds  :  no CVA tenderness   Musculoskeletal:  RUE non-pitting edema, no gross erythema, no increased warmth  Neurologic:  RUE motion limited, particularly  strength  Skin:  no rash  Lymph: no lymphadenopathy  Psychiatric:  appropriate affect  Vascular:  no phlebitis                            8.5    11.61 )-----------( 332      ( 2019 09:00 )             27.2     07    137  |  101  |  96<H>  ----------------------------<  168<H>  3.9   |  20<L>  |  3.15<H>    Ca    8.7      2019 07:06  Phos  4.4       Mg     1.8           MICROBIOLOGY:Vancomycin Level, Random: 7.6 ( @ 06:03)    Culture - Blood (collected 2019 22:14)  Source: .Blood  Preliminary Report (2019 23:01):    No growth to date.    Culture - Blood (collected 2019 22:14)  Source: .Blood  Preliminary Report (2019 23:01):    No growth to date.                  RADIOLOGY:  < from: Xray Shoulder Axillary View, Right (19 @ 20:10) >  < from: [facility] Duplex Upper Ext Vein Scan, Right (19 @ 19:43) >  IMPRESSION:     No evidence of right upper extremity deep venous thrombosis.    Dorsal right hand subcutaneous edema.    < end of copied text >  < from: [facility] Duplex Upper Ext Vein Scan, Right (19 @ 19:43) >  IMPRESSION:     No evidence of right upper extremity deep venous thrombosis.    Dorsal right hand subcutaneous edema.    < end of copied text >      IMPRESSION:  No acute fracture or dislocation.      < end of copied text >  imaging below personally reviewed

## 2019-07-05 NOTE — CONSULT NOTE ADULT - ASSESSMENT
7yo M w/ PMH HTN, CHF s/p PPM, afib on Coumadin, chronic renal insufficiency, BPH, gout presenting with R arm swelling of one months duration with initial leukocytosis with initial improvement now increasing. ID consulted for lack of improvement in erythema and rising leukocytosis.    RUE swelling, erythema, leukocytosis  -minimal erythema, no warmth, without systemic signs of toxicity including fever or chills, no purulence, no clear evidence of cellulitis  -Initially given one dose clindamycin, vancomycin, cefazolin, maintained on ceftriaxone since 7/3. ordered for additional dose of vancomycin today.  -BCx NTD 9yo M w/ PMH HTN, CHF s/p PPM, afib on Coumadin, chronic renal insufficiency, BPH, gout presenting with R arm swelling of one months duration with initial leukocytosis with initial improvement now increasing. ID consulted for lack of improvement in erythema and rising leukocytosis.    RUE swelling, erythema, leukocytosis  -minimal erythema, no warmth, without systemic signs of toxicity including fever or chills, no purulence, no clear evidence of cellulitis  -Initially given one dose clindamycin, vancomycin, cefazolin, maintained on ceftriaxone since 7/3. ordered for additional dose of vancomycin today.  -BCx NTD  -DVT ruled out, ddx includes lymphedema, trauma  -would consider further imaging of RUE 9yo M w/ PMH HTN, CHF s/p PPM, afib on Coumadin, chronic renal insufficiency, BPH, gout presenting with R arm swelling of one months duration with initial leukocytosis with initial improvement now increasing. ID consulted for lack of improvement in erythema and rising leukocytosis.    RUE swelling, erythema, leukocytosis  -minimal erythema, no warmth, without systemic signs of toxicity including fever or chills, no purulence, no clear evidence of cellulitis  -no evidence of septic arthritis  -Initially given one dose clindamycin, vancomycin, cefazolin, maintained on ceftriaxone since 7/3. ordered for additional dose of vancomycin today.  -BCx NTD  -DVT ruled out, ddx includes lymphedema,  -would consider further imaging of RUE, CT without contrast of RUE  -can c/w ceftriaxone for now 89yo M w/ PMH HTN, CHF s/p PPM, afib on Coumadin, chronic renal insufficiency, BPH, gout presenting with R arm swelling of one months duration with initial leukocytosis with initial improvement now increasing. ID consulted for lack of improvement in erythema and rising leukocytosis.    RUE swelling, erythema, leukocytosis  -minimal erythema, no warmth, without systemic signs of toxicity including fever or chills, no purulence, no clear evidence of cellulitis  -no evidence of septic arthritis  -Initially given one dose clindamycin, vancomycin, cefazolin, maintained on ceftriaxone since 7/3. ordered for additional dose of vancomycin today.  -BCx NTD  -DVT ruled out, ddx includes lymphedema,  -would consider further imaging of RUE, CT without contrast of RUE  -can c/w ceftriaxone for now

## 2019-07-05 NOTE — CONSULT NOTE ADULT - ATTENDING COMMENTS
87yo M w/ PMH HTN, CHF s/p PPM, afib on Coumadin, chronic renal insufficiency, BPH, gout presenting with R arm swelling of one months duration with initial leukocytosis with initial improvement now increasing. ID consulted for lack of improvement in erythema and rising leukocytosis.  No DVT seen on doppler.      RUE swelling, erythema, leukocytosis  - Arm appearance not classic for cellulitis. Seems to have fairly good range of motion making joint involvement less likely.  - Pt is non-toxic and w/o fever now. Had low grade fever on admission.    - Favor getting further imaging. D/w medical attending as well. Will get CT RUE w/o contrast.   -BCx NGTD  -can c/w ceftriaxone for now  - ID team to see this weekend. Please call [phone] with questions.    Opal Benoit MD  [phone] (pager)  [phone] (office)

## 2019-07-05 NOTE — PROGRESS NOTE ADULT - PROBLEM SELECTOR PLAN 1
- elevated ESR/CRP cellulitis with possible component of tenosynovitis, Afebrile, not meeting sepsis criteria.   - RUE DVT ruled out   - on empiric vancomycin by level and ceftriaxone, has shown minimal improvement  - continue to elevate arm above heart level, d/w Pt and RN   - BCx (7/2): NGTD x 2  - ID consult given minimal improvement and leukocytosis. May need further/repeat imaging of RUE r/o phlegmon or early abscess - elevated ESR/CRP cellulitis with possible component of tenosynovitis, Afebrile, not meeting sepsis criteria.   - RUE DVT ruled out   - on ceftriaxone and received 1 gram Vancomycin on admission, has shown minimal improvement  - repeat Vancomycin 1 g IVSS now with repeat dosing by level given impaired renal clearance  - continue to elevate arm above heart level, d/w Pt and RN   - BCx (7/2): NGTD x 2  - ID consult given minimal improvement and leukocytosis. May need further/repeat imaging of RUE r/o phlegmon or early abscess

## 2019-07-06 LAB
ANION GAP SERPL CALC-SCNC: 16 MMOL/L — SIGNIFICANT CHANGE UP (ref 5–17)
BUN SERPL-MCNC: 97 MG/DL — HIGH (ref 7–23)
CALCIUM SERPL-MCNC: 8.6 MG/DL — SIGNIFICANT CHANGE UP (ref 8.4–10.5)
CHLORIDE SERPL-SCNC: 98 MMOL/L — SIGNIFICANT CHANGE UP (ref 96–108)
CO2 SERPL-SCNC: 21 MMOL/L — LOW (ref 22–31)
CREAT SERPL-MCNC: 3.21 MG/DL — HIGH (ref 0.5–1.3)
GLUCOSE SERPL-MCNC: 200 MG/DL — HIGH (ref 70–99)
HCT VFR BLD CALC: 24.7 % — LOW (ref 39–50)
HGB BLD-MCNC: 8 G/DL — LOW (ref 13–17)
INR BLD: 1.73 RATIO — HIGH (ref 0.88–1.16)
MAGNESIUM SERPL-MCNC: 1.7 MG/DL — SIGNIFICANT CHANGE UP (ref 1.6–2.6)
MCHC RBC-ENTMCNC: 27.7 PG — SIGNIFICANT CHANGE UP (ref 27–34)
MCHC RBC-ENTMCNC: 32.4 GM/DL — SIGNIFICANT CHANGE UP (ref 32–36)
MCV RBC AUTO: 85.5 FL — SIGNIFICANT CHANGE UP (ref 80–100)
PHOSPHATE SERPL-MCNC: 3.9 MG/DL — SIGNIFICANT CHANGE UP (ref 2.5–4.5)
PLATELET # BLD AUTO: 335 K/UL — SIGNIFICANT CHANGE UP (ref 150–400)
POTASSIUM SERPL-MCNC: 3.7 MMOL/L — SIGNIFICANT CHANGE UP (ref 3.5–5.3)
POTASSIUM SERPL-SCNC: 3.7 MMOL/L — SIGNIFICANT CHANGE UP (ref 3.5–5.3)
PROTHROM AB SERPL-ACNC: 20 SEC — HIGH (ref 10–13.1)
RBC # BLD: 2.89 M/UL — LOW (ref 4.2–5.8)
RBC # FLD: 15.8 % — HIGH (ref 10.3–14.5)
SODIUM SERPL-SCNC: 135 MMOL/L — SIGNIFICANT CHANGE UP (ref 135–145)
WBC # BLD: 13.61 K/UL — HIGH (ref 3.8–10.5)
WBC # FLD AUTO: 13.61 K/UL — HIGH (ref 3.8–10.5)

## 2019-07-06 PROCEDURE — 99233 SBSQ HOSP IP/OBS HIGH 50: CPT

## 2019-07-06 PROCEDURE — 73200 CT UPPER EXTREMITY W/O DYE: CPT | Mod: 26,RT

## 2019-07-06 RX ORDER — WARFARIN SODIUM 2.5 MG/1
2.5 TABLET ORAL ONCE
Refills: 0 | Status: COMPLETED | OUTPATIENT
Start: 2019-07-06 | End: 2019-07-06

## 2019-07-06 RX ORDER — LANOLIN ALCOHOL/MO/W.PET/CERES
3 CREAM (GRAM) TOPICAL AT BEDTIME
Refills: 0 | Status: COMPLETED | OUTPATIENT
Start: 2019-07-06 | End: 2019-07-06

## 2019-07-06 RX ADMIN — Medication 650 MILLIGRAM(S): at 12:38

## 2019-07-06 RX ADMIN — FINASTERIDE 5 MILLIGRAM(S): 5 TABLET, FILM COATED ORAL at 12:38

## 2019-07-06 RX ADMIN — Medication 25 MILLIGRAM(S): at 18:10

## 2019-07-06 RX ADMIN — Medication 100 MILLIGRAM(S): at 13:42

## 2019-07-06 RX ADMIN — Medication 25 MILLIGRAM(S): at 05:15

## 2019-07-06 RX ADMIN — Medication 10 MILLIGRAM(S): at 21:10

## 2019-07-06 RX ADMIN — WARFARIN SODIUM 2.5 MILLIGRAM(S): 2.5 TABLET ORAL at 21:10

## 2019-07-06 RX ADMIN — Medication 650 MILLIGRAM(S): at 13:00

## 2019-07-06 RX ADMIN — Medication 10 MILLIGRAM(S): at 13:43

## 2019-07-06 RX ADMIN — Medication 10 MILLIGRAM(S): at 05:15

## 2019-07-06 RX ADMIN — CEFTRIAXONE 100 MILLIGRAM(S): 500 INJECTION, POWDER, FOR SOLUTION INTRAMUSCULAR; INTRAVENOUS at 05:15

## 2019-07-06 RX ADMIN — LOSARTAN POTASSIUM 25 MILLIGRAM(S): 100 TABLET, FILM COATED ORAL at 05:15

## 2019-07-06 RX ADMIN — Medication 100 MILLIGRAM(S): at 05:15

## 2019-07-06 RX ADMIN — LACTOBACILLUS ACIDOPH-L.BULGARICUS 1 MILLION CELL CHEWABLE TABLET 1 TABLET(S): at 13:42

## 2019-07-06 RX ADMIN — Medication 3 MILLIGRAM(S): at 21:55

## 2019-07-06 RX ADMIN — POLYETHYLENE GLYCOL 3350 17 GRAM(S): 17 POWDER, FOR SOLUTION ORAL at 13:41

## 2019-07-06 NOTE — PROGRESS NOTE ADULT - PROBLEM SELECTOR PLAN 1
- elevated ESR/CRP cellulitis with possible component of tenosynovitis, Afebrile, not meeting sepsis criteria.   - RUE DVT ruled out   - c/w ceftriaxone and received 1 gram, with minimal improvement  - continue to elevate arm above heart level, d/w Pt and RN   - BCx (7/2): NGTD x 2  - ID consult appreciated: CT RUE

## 2019-07-06 NOTE — PHYSICAL THERAPY INITIAL EVALUATION ADULT - RANGE OF MOTION EXAMINATION, REHAB EVAL
deficits as listed below/Right UE limited to 10% of normal AROM. Right LE limited to 10% of normal AROM and 20% of normal PROM. Left LE limited to 50% of normal ROM. ROM limited in all extremities due to swelling

## 2019-07-06 NOTE — PHYSICAL THERAPY INITIAL EVALUATION ADULT - PERTINENT HX OF CURRENT PROBLEM, REHAB EVAL
Pt is an 88 M with PMH of HTN, CHF s/p PPM, Afib, chronic renal insufficiency, BPH, gout that presented with Right UE swelling. + R UE cellulitis.

## 2019-07-06 NOTE — PHYSICAL THERAPY INITIAL EVALUATION ADULT - ADDITIONAL COMMENTS
Pt states that he lives alone in a private house with 2 steps to enter. Pt has 2 steps to enter that he requires assistance from his family to negotiate. Pt is independent in house hold ambulation, functional activities, and ADLs with walker.

## 2019-07-07 LAB
ANION GAP SERPL CALC-SCNC: 15 MMOL/L — SIGNIFICANT CHANGE UP (ref 5–17)
ANION GAP SERPL CALC-SCNC: 18 MMOL/L — HIGH (ref 5–17)
BUN SERPL-MCNC: 101 MG/DL — HIGH (ref 7–23)
BUN SERPL-MCNC: 104 MG/DL — HIGH (ref 7–23)
CALCIUM SERPL-MCNC: 6.9 MG/DL — LOW (ref 8.4–10.5)
CALCIUM SERPL-MCNC: 8.5 MG/DL — SIGNIFICANT CHANGE UP (ref 8.4–10.5)
CHLORIDE SERPL-SCNC: 103 MMOL/L — SIGNIFICANT CHANGE UP (ref 96–108)
CHLORIDE SERPL-SCNC: 98 MMOL/L — SIGNIFICANT CHANGE UP (ref 96–108)
CO2 SERPL-SCNC: 18 MMOL/L — LOW (ref 22–31)
CO2 SERPL-SCNC: 20 MMOL/L — LOW (ref 22–31)
CREAT ?TM UR-MCNC: 131 MG/DL — SIGNIFICANT CHANGE UP
CREAT SERPL-MCNC: 3.16 MG/DL — HIGH (ref 0.5–1.3)
CREAT SERPL-MCNC: 3.76 MG/DL — HIGH (ref 0.5–1.3)
CULTURE RESULTS: SIGNIFICANT CHANGE UP
CULTURE RESULTS: SIGNIFICANT CHANGE UP
GLUCOSE SERPL-MCNC: 156 MG/DL — HIGH (ref 70–99)
GLUCOSE SERPL-MCNC: 208 MG/DL — HIGH (ref 70–99)
HCT VFR BLD CALC: 23.8 % — LOW (ref 39–50)
HGB BLD-MCNC: 7.5 G/DL — LOW (ref 13–17)
INR BLD: 2.12 RATIO — HIGH (ref 0.88–1.16)
MCHC RBC-ENTMCNC: 27.2 PG — SIGNIFICANT CHANGE UP (ref 27–34)
MCHC RBC-ENTMCNC: 31.5 GM/DL — LOW (ref 32–36)
MCV RBC AUTO: 86.2 FL — SIGNIFICANT CHANGE UP (ref 80–100)
PLATELET # BLD AUTO: 330 K/UL — SIGNIFICANT CHANGE UP (ref 150–400)
POTASSIUM SERPL-MCNC: 3.7 MMOL/L — SIGNIFICANT CHANGE UP (ref 3.5–5.3)
POTASSIUM SERPL-MCNC: 3.8 MMOL/L — SIGNIFICANT CHANGE UP (ref 3.5–5.3)
POTASSIUM SERPL-SCNC: 3.7 MMOL/L — SIGNIFICANT CHANGE UP (ref 3.5–5.3)
POTASSIUM SERPL-SCNC: 3.8 MMOL/L — SIGNIFICANT CHANGE UP (ref 3.5–5.3)
PROTHROM AB SERPL-ACNC: 24.9 SEC — HIGH (ref 10–13.1)
RBC # BLD: 2.76 M/UL — LOW (ref 4.2–5.8)
RBC # FLD: 15.8 % — HIGH (ref 10.3–14.5)
SODIUM SERPL-SCNC: 136 MMOL/L — SIGNIFICANT CHANGE UP (ref 135–145)
SODIUM SERPL-SCNC: 136 MMOL/L — SIGNIFICANT CHANGE UP (ref 135–145)
SODIUM UR-SCNC: <20 MMOL/L — SIGNIFICANT CHANGE UP
SPECIMEN SOURCE: SIGNIFICANT CHANGE UP
SPECIMEN SOURCE: SIGNIFICANT CHANGE UP
WBC # BLD: 10.97 K/UL — HIGH (ref 3.8–10.5)
WBC # FLD AUTO: 10.97 K/UL — HIGH (ref 3.8–10.5)

## 2019-07-07 PROCEDURE — 99232 SBSQ HOSP IP/OBS MODERATE 35: CPT

## 2019-07-07 PROCEDURE — 99233 SBSQ HOSP IP/OBS HIGH 50: CPT

## 2019-07-07 RX ORDER — SIMETHICONE 80 MG/1
80 TABLET, CHEWABLE ORAL EVERY 4 HOURS
Refills: 0 | Status: DISCONTINUED | OUTPATIENT
Start: 2019-07-07 | End: 2019-07-10

## 2019-07-07 RX ORDER — LANOLIN ALCOHOL/MO/W.PET/CERES
3 CREAM (GRAM) TOPICAL AT BEDTIME
Refills: 0 | Status: COMPLETED | OUTPATIENT
Start: 2019-07-07 | End: 2019-07-07

## 2019-07-07 RX ORDER — WARFARIN SODIUM 2.5 MG/1
2.5 TABLET ORAL ONCE
Refills: 0 | Status: COMPLETED | OUTPATIENT
Start: 2019-07-07 | End: 2019-07-07

## 2019-07-07 RX ORDER — SODIUM CHLORIDE 9 MG/ML
1000 INJECTION INTRAMUSCULAR; INTRAVENOUS; SUBCUTANEOUS
Refills: 0 | Status: DISCONTINUED | OUTPATIENT
Start: 2019-07-07 | End: 2019-07-08

## 2019-07-07 RX ADMIN — SIMETHICONE 80 MILLIGRAM(S): 80 TABLET, CHEWABLE ORAL at 17:26

## 2019-07-07 RX ADMIN — Medication 650 MILLIGRAM(S): at 12:20

## 2019-07-07 RX ADMIN — Medication 650 MILLIGRAM(S): at 22:18

## 2019-07-07 RX ADMIN — Medication 10 MILLIGRAM(S): at 21:26

## 2019-07-07 RX ADMIN — SODIUM CHLORIDE 75 MILLILITER(S): 9 INJECTION INTRAMUSCULAR; INTRAVENOUS; SUBCUTANEOUS at 10:33

## 2019-07-07 RX ADMIN — Medication 10 MILLIGRAM(S): at 05:14

## 2019-07-07 RX ADMIN — CEFTRIAXONE 100 MILLIGRAM(S): 500 INJECTION, POWDER, FOR SOLUTION INTRAMUSCULAR; INTRAVENOUS at 05:12

## 2019-07-07 RX ADMIN — Medication 25 MILLIGRAM(S): at 05:15

## 2019-07-07 RX ADMIN — SIMETHICONE 80 MILLIGRAM(S): 80 TABLET, CHEWABLE ORAL at 21:46

## 2019-07-07 RX ADMIN — Medication 650 MILLIGRAM(S): at 06:31

## 2019-07-07 RX ADMIN — Medication 3 MILLIGRAM(S): at 21:26

## 2019-07-07 RX ADMIN — POLYETHYLENE GLYCOL 3350 17 GRAM(S): 17 POWDER, FOR SOLUTION ORAL at 12:18

## 2019-07-07 RX ADMIN — LACTOBACILLUS ACIDOPH-L.BULGARICUS 1 MILLION CELL CHEWABLE TABLET 1 TABLET(S): at 12:18

## 2019-07-07 RX ADMIN — Medication 650 MILLIGRAM(S): at 12:40

## 2019-07-07 RX ADMIN — Medication 650 MILLIGRAM(S): at 05:15

## 2019-07-07 RX ADMIN — FINASTERIDE 5 MILLIGRAM(S): 5 TABLET, FILM COATED ORAL at 12:26

## 2019-07-07 RX ADMIN — Medication 650 MILLIGRAM(S): at 21:48

## 2019-07-07 RX ADMIN — Medication 100 MILLIGRAM(S): at 15:37

## 2019-07-07 NOTE — PROGRESS NOTE ADULT - PROBLEM SELECTOR PLAN 1
- elevated ESR/CRP cellulitis with possible component of tenosynovitis, Afebrile, not meeting sepsis criteria.   - RUE DVT ruled out   - c/w ceftriaxone and received 1 gram, with minimal improvement  - continue to elevate arm above heart level, d/w Pt and RN   - BCx (7/2): NGTD x 2  - s/p CT RUE: consistent with cellulitis   - ID consult appreciated: c/w abx till 7/9

## 2019-07-08 ENCOUNTER — TRANSCRIPTION ENCOUNTER (OUTPATIENT)
Age: 84
End: 2019-07-08

## 2019-07-08 DIAGNOSIS — D64.9 ANEMIA, UNSPECIFIED: ICD-10-CM

## 2019-07-08 LAB
ANION GAP SERPL CALC-SCNC: 18 MMOL/L — HIGH (ref 5–17)
APTT BLD: 35 SEC — SIGNIFICANT CHANGE UP (ref 27.5–36.3)
BUN SERPL-MCNC: 109 MG/DL — HIGH (ref 7–23)
CALCIUM SERPL-MCNC: 8.1 MG/DL — LOW (ref 8.4–10.5)
CHLORIDE SERPL-SCNC: 99 MMOL/L — SIGNIFICANT CHANGE UP (ref 96–108)
CO2 SERPL-SCNC: 20 MMOL/L — LOW (ref 22–31)
CREAT SERPL-MCNC: 3.71 MG/DL — HIGH (ref 0.5–1.3)
GLUCOSE SERPL-MCNC: 186 MG/DL — HIGH (ref 70–99)
HCT VFR BLD CALC: 22.9 % — LOW (ref 39–50)
HGB BLD-MCNC: 7.2 G/DL — LOW (ref 13–17)
INR BLD: 2.81 RATIO — HIGH (ref 0.88–1.16)
MCHC RBC-ENTMCNC: 26.7 PG — LOW (ref 27–34)
MCHC RBC-ENTMCNC: 31.4 GM/DL — LOW (ref 32–36)
MCV RBC AUTO: 84.8 FL — SIGNIFICANT CHANGE UP (ref 80–100)
PLATELET # BLD AUTO: 308 K/UL — SIGNIFICANT CHANGE UP (ref 150–400)
POTASSIUM SERPL-MCNC: 4 MMOL/L — SIGNIFICANT CHANGE UP (ref 3.5–5.3)
POTASSIUM SERPL-SCNC: 4 MMOL/L — SIGNIFICANT CHANGE UP (ref 3.5–5.3)
PROTHROM AB SERPL-ACNC: 33.1 SEC — HIGH (ref 10–13.1)
RBC # BLD: 2.7 M/UL — LOW (ref 4.2–5.8)
RBC # FLD: 15.7 % — HIGH (ref 10.3–14.5)
SODIUM SERPL-SCNC: 137 MMOL/L — SIGNIFICANT CHANGE UP (ref 135–145)
WBC # BLD: 9.22 K/UL — SIGNIFICANT CHANGE UP (ref 3.8–10.5)
WBC # FLD AUTO: 9.22 K/UL — SIGNIFICANT CHANGE UP (ref 3.8–10.5)

## 2019-07-08 PROCEDURE — 99232 SBSQ HOSP IP/OBS MODERATE 35: CPT

## 2019-07-08 PROCEDURE — 99233 SBSQ HOSP IP/OBS HIGH 50: CPT

## 2019-07-08 RX ORDER — LIDOCAINE 4 G/100G
1 CREAM TOPICAL DAILY
Refills: 0 | Status: DISCONTINUED | OUTPATIENT
Start: 2019-07-08 | End: 2019-07-10

## 2019-07-08 RX ORDER — DIPHENHYDRAMINE HCL 50 MG
25 CAPSULE ORAL ONCE
Refills: 0 | Status: COMPLETED | OUTPATIENT
Start: 2019-07-08 | End: 2019-07-08

## 2019-07-08 RX ORDER — ACETAMINOPHEN 500 MG
975 TABLET ORAL EVERY 12 HOURS
Refills: 0 | Status: DISCONTINUED | OUTPATIENT
Start: 2019-07-08 | End: 2019-07-10

## 2019-07-08 RX ORDER — SODIUM CHLORIDE 9 MG/ML
1000 INJECTION INTRAMUSCULAR; INTRAVENOUS; SUBCUTANEOUS
Refills: 0 | Status: DISCONTINUED | OUTPATIENT
Start: 2019-07-08 | End: 2019-07-09

## 2019-07-08 RX ORDER — PETROLATUM,WHITE
1 JELLY (GRAM) TOPICAL
Refills: 0 | Status: DISCONTINUED | OUTPATIENT
Start: 2019-07-08 | End: 2019-07-10

## 2019-07-08 RX ORDER — ERYTHROPOIETIN 10000 [IU]/ML
20000 INJECTION, SOLUTION INTRAVENOUS; SUBCUTANEOUS
Refills: 0 | Status: DISCONTINUED | OUTPATIENT
Start: 2019-07-08 | End: 2019-07-10

## 2019-07-08 RX ORDER — TRAMADOL HYDROCHLORIDE 50 MG/1
25 TABLET ORAL EVERY 8 HOURS
Refills: 0 | Status: DISCONTINUED | OUTPATIENT
Start: 2019-07-08 | End: 2019-07-10

## 2019-07-08 RX ORDER — WARFARIN SODIUM 2.5 MG/1
1 TABLET ORAL ONCE
Refills: 0 | Status: COMPLETED | OUTPATIENT
Start: 2019-07-08 | End: 2019-07-08

## 2019-07-08 RX ADMIN — Medication 650 MILLIGRAM(S): at 06:00

## 2019-07-08 RX ADMIN — Medication 10 MILLIGRAM(S): at 05:03

## 2019-07-08 RX ADMIN — LACTOBACILLUS ACIDOPH-L.BULGARICUS 1 MILLION CELL CHEWABLE TABLET 1 TABLET(S): at 15:07

## 2019-07-08 RX ADMIN — Medication 25 MILLIGRAM(S): at 05:03

## 2019-07-08 RX ADMIN — Medication 650 MILLIGRAM(S): at 09:28

## 2019-07-08 RX ADMIN — Medication 650 MILLIGRAM(S): at 05:04

## 2019-07-08 RX ADMIN — CEFTRIAXONE 100 MILLIGRAM(S): 500 INJECTION, POWDER, FOR SOLUTION INTRAMUSCULAR; INTRAVENOUS at 05:03

## 2019-07-08 RX ADMIN — Medication 25 MILLIGRAM(S): at 17:21

## 2019-07-08 RX ADMIN — LOSARTAN POTASSIUM 25 MILLIGRAM(S): 100 TABLET, FILM COATED ORAL at 08:18

## 2019-07-08 RX ADMIN — WARFARIN SODIUM 2.5 MILLIGRAM(S): 2.5 TABLET ORAL at 00:07

## 2019-07-08 RX ADMIN — Medication 10 MILLIGRAM(S): at 13:00

## 2019-07-08 RX ADMIN — WARFARIN SODIUM 1 MILLIGRAM(S): 2.5 TABLET ORAL at 22:00

## 2019-07-08 RX ADMIN — TRAMADOL HYDROCHLORIDE 25 MILLIGRAM(S): 50 TABLET ORAL at 14:22

## 2019-07-08 RX ADMIN — Medication 650 MILLIGRAM(S): at 10:28

## 2019-07-08 RX ADMIN — ERYTHROPOIETIN 20000 UNIT(S): 10000 INJECTION, SOLUTION INTRAVENOUS; SUBCUTANEOUS at 17:34

## 2019-07-08 RX ADMIN — LIDOCAINE 1 PATCH: 4 CREAM TOPICAL at 19:42

## 2019-07-08 RX ADMIN — SODIUM CHLORIDE 50 MILLILITER(S): 9 INJECTION INTRAMUSCULAR; INTRAVENOUS; SUBCUTANEOUS at 13:00

## 2019-07-08 RX ADMIN — Medication 25 MILLIGRAM(S): at 22:00

## 2019-07-08 RX ADMIN — FINASTERIDE 5 MILLIGRAM(S): 5 TABLET, FILM COATED ORAL at 13:00

## 2019-07-08 RX ADMIN — Medication 10 MILLIGRAM(S): at 22:01

## 2019-07-08 RX ADMIN — Medication 975 MILLIGRAM(S): at 17:21

## 2019-07-08 RX ADMIN — TRAMADOL HYDROCHLORIDE 25 MILLIGRAM(S): 50 TABLET ORAL at 13:22

## 2019-07-08 RX ADMIN — LIDOCAINE 1 PATCH: 4 CREAM TOPICAL at 13:23

## 2019-07-08 RX ADMIN — Medication 1 APPLICATION(S): at 22:50

## 2019-07-08 RX ADMIN — Medication 975 MILLIGRAM(S): at 18:02

## 2019-07-08 NOTE — DISCHARGE NOTE PROVIDER - HOSPITAL COURSE
89yo M w/ PMH HTN, HFpEF, Afib on couamdin s/p PPM, CKD3, BPH, gout presenting with 1 month of R shoulder pain and R arm swelling and 2 weeks of R arm erythema concerning for cellulitis vs thrombophlebitis.    Cellulitis of right upper extremity.  elevated ESR/CRP cellulitis with possible component of tenosynovitis,    - RUE DVT ruled out     - c/w ceftriaxone till 7/9    - continue to elevate arm above heart level    - BCx (7/2): NGTD x 2    - s/p CT RUE: consistent with cellulitis.      Shoulder pain.   Could be 2/2 muscle strain. R shoulder xr negative for dislocation or fracture. Pt has some difficulty with full active ROM in R shoulder. Can perform passive ROM in R shoulder.    continue with pain medication.    Atrial fibrillation.  Rate controlled, PPM for tachybrady syndrome    - patient with supratherapeutic INR since admission s/p Vitamin K    INR stable    - c/w metoprolol    follow up-  PT/INR daily.     Congestive heart failure.  Lungs remain clear, stable LE edema, does not appear in acute decompensation    - c/w metoprolol.       ECHO 2018: EF75%, with moderate AS.    CKD (chronic kidney disease), stage IV.     - monitor BMP, avoid nephrotoxins, renally dose meds    - Hold losartan. 87yo M w/ PMH HTN, HFpEF, Afib on couamdin s/p PPM, CKD3, BPH, gout presenting with 1 month of R shoulder pain and R arm swelling and 2 weeks of R arm erythema concerning for cellulitis vs thrombophlebitis.    Cellulitis of right upper extremity.  elevated ESR/CRP cellulitis with possible component of tenosynovitis,    - RUE DVT ruled out     - c/w ceftriaxone till 7/9    - continue to elevate arm above heart level    - BCx (7/2): NGTD x 2    - s/p CT RUE: consistent with cellulitis.      Shoulder pain.   Could be 2/2 muscle strain. R shoulder xr negative for dislocation or fracture. Pt has some difficulty with full active ROM in R shoulder. Can perform passive ROM in R shoulder.    continue with pain medication.    Atrial fibrillation.  Rate controlled, PPM for tachybrady syndrome    - patient with supratherapeutic INR since admission s/p Vitamin K    INR stable    - c/w metoprolol    follow up-  PT/INR daily.     Congestive heart failure.  Lungs remain clear, stable LE edema, does not appear in acute decompensation    - c/w metoprolol.       ECHO 2018: EF75%, with moderate AS.    CKD (chronic kidney disease), stage IV.     - monitor BMP, avoid nephrotoxins, renally dose meds    - Hold losartan.     DCP with med rec discussed with Dr JONATHON Cruz PT is cleared for dc rehab     Follow up with PMD Dr Caceres 89yo M w/ PMH HTN, HFpEF, Afib on couamdin s/p PPM, CKD3, BPH, gout presenting with 1 month of R shoulder pain and R arm swelling and 2 weeks of R arm erythema concerning for cellulitis vs thrombophlebitis.    Cellulitis of right upper extremity. RUE DVT ruled out, s/p ceftriaxone till 7/9, continue to elevate arm above heart level,  BCx (7/2): NGTD x 2, s/p CT RUE: consistent with cellulitis.     Shoulder pain.   Could be 2/2 muscle strain. R shoulder xr negative for dislocation or fracture. Pt has some difficulty with full active ROM in R shoulder. Can perform passive ROM in R shoulder.Continue with pain medication.    Atrial fibrillation.  Rate controlled, PPM for tachybrady syndrome. Pt with supratherapeutic INR today 3.46, hold coumadin tonight, check INR in am 7/11, c/w metoprolol    Congestive heart failure.  Lungs remain clear, stable LE edema, does not appear in acute decompensation, c/w metoprolol. ECHO 2018: EF75%, with moderate AS.    CKD (chronic kidney disease), stage IV. monitor BMP, avoid nephrotoxins, renally dose meds,  Hold losartan.     Follow up with PMD Dr Caceres 87yo M w/ PMH HTN, HFpEF, Afib on couamdin s/p PPM, CKD3, BPH, gout presenting with 1 month of R shoulder pain and R arm swelling and 2 weeks of R arm erythema concerning for cellulitis vs thrombophlebitis.    Cellulitis of right upper extremity. RUE DVT ruled out, s/p ceftriaxone till 7/9, continue to elevate arm above heart level,  BCx (7/2): NGTD x 2, s/p CT RUE: consistent with cellulitis. Apply warm compresses to the area     Shoulder pain.   Could be 2/2 muscle strain. R shoulder xr negative for dislocation or fracture. Pt has some difficulty with full active ROM in R shoulder. Can perform passive ROM in R shoulder.Continue with pain medication.    Atrial fibrillation.  Rate controlled, PPM for tachybrady syndrome. Pt with supratherapeutic INR today 3.46, hold coumadin tonight, check INR in am 7/11, c/w metoprolol    Congestive heart failure.  Lungs remain clear, stable LE edema, does not appear in acute decompensation, c/w metoprolol. ECHO 2018: EF75%, with moderate AS.    CKD (chronic kidney disease), stage IV. monitor BMP, avoid nephrotoxins, renally dose meds,  Hold losartan.     Follow up with PMD Dr Caceres

## 2019-07-08 NOTE — DISCHARGE NOTE PROVIDER - CARE PROVIDERS DIRECT ADDRESSES
[email or web address]

## 2019-07-08 NOTE — DISCHARGE NOTE PROVIDER - NSDCCPCAREPLAN_GEN_ALL_CORE_FT
PRINCIPAL DISCHARGE DIAGNOSIS  Diagnosis: Cellulitis  Assessment and Plan of Treatment: right lower extremity  treatment with antibiotic  monitor for fever, rednees, wselling      SECONDARY DISCHARGE DIAGNOSES  Diagnosis: CKD (chronic kidney disease), stage IV  Assessment and Plan of Treatment: CKD (chronic kidney disease), stage IV  avoid rrenal toxic medication  monitor kidney function  follow up by your kidney doctor    Diagnosis: Anemia  Assessment and Plan of Treatment: Anemia  possible anemia of chronic disease  follow up by renal on dosing epogen  monitor CBC PRINCIPAL DISCHARGE DIAGNOSIS  Diagnosis: Cellulitis  Assessment and Plan of Treatment: right lower extremity  treatment with antibiotic  monitor for fever, rednees, wselling      SECONDARY DISCHARGE DIAGNOSES  Diagnosis: Atrial fibrillation  Assessment and Plan of Treatment: INR 3.46 today, hold coumadin tonight, check INR in am, goal INR 2-3    Diagnosis: Anemia  Assessment and Plan of Treatment: Anemia  possible anemia of chronic disease  follow up by renal on dosing epogen  monitor CBC    Diagnosis: CKD (chronic kidney disease), stage IV  Assessment and Plan of Treatment: CKD (chronic kidney disease), stage IV  avoid rrenal toxic medication  monitor kidney function  follow up by your kidney doctor

## 2019-07-08 NOTE — CHART NOTE - NSCHARTNOTEFT_GEN_A_CORE
Primary team called nephrology team regarding restarting EPO for Anemia of chronic disease on CKD with hemoglobin downtrend from 8.0 to 7.2 mg/dL.  Upon reviewing [facility] EMR, we notice patient outpatient regimen is Primary team called nephrology team regarding restarting EPO for Anemia of chronic disease on CKD with hemoglobin downtrend from 8.0 to 7.2 mg/dL.  Upon reviewing [facility] EMR, we notice patient outpatient regimen is PROCRIT 20,000 subcutaneous weekly.      PLAN:  - restart home dose procrit 20,000 subq weekly  - monitor CBC Primary team called nephrology team regarding restarting EPO for Anemia of chronic disease on CKD with hemoglobin downtrend from 8.0 to 7.2 mg/dL.  Upon reviewing [facility] EMR, we notice patient outpatient regimen is PROCRIT 20,000 Units subcutaneous weekly.      PLAN:  - restart home dose procrit 20,000 Units subq weekly (ordered by nephrology)  - monitor CBC Primary team called nephrology team regarding restarting EPO for Anemia of chronic disease on CKD with hemoglobin downtrend from 8.0 to 7.2 mg/dL.  Upon reviewing [facility] EMR, we notice patient outpatient regimen is PROCRIT 20,000 Units subcutaneous weekly.      PLAN:  - restart home dose procrit 20,000 Units subq weekly (ordered by nephrology)  - monitor CBC    Attending  Concur with plan  TOVA Cho.

## 2019-07-08 NOTE — PROGRESS NOTE ADULT - PROBLEM SELECTOR PLAN 9
- DVT ppx: on coumadin  - Diet: DASH  - PT eval: SATISH - Daughter reports pt sees Dr Hughes nephrologist as an outpt, reports she was scheduled to get epo as outpt   - Check iron studies   - renal eval for epo

## 2019-07-08 NOTE — PROGRESS NOTE ADULT - PROBLEM SELECTOR PLAN 1
- elevated ESR/CRP cellulitis with possible component of tenosynovitis,  - RUE DVT ruled out   - c/w ceftriaxone till 7/9  - continue to elevate arm above heart level  - BCx (7/2): NGTD x 2  - s/p CT RUE: consistent with cellulitis

## 2019-07-08 NOTE — DISCHARGE NOTE PROVIDER - CARE PROVIDER_API CALL
Stefan Hughes (MD)  Internal Medicine; Nephrology  [address], [address] [place] [address]  Phone: [phone]  Fax: [phone]  Follow Up Time:     Lm Caceres)  Cardiovascular Disease; Internal Medicine  [address], [address]  [place] [address]  Phone: [phone]  Fax: [phone]  Follow Up Time:

## 2019-07-09 LAB
ANION GAP SERPL CALC-SCNC: 17 MMOL/L — SIGNIFICANT CHANGE UP (ref 5–17)
BUN SERPL-MCNC: 101 MG/DL — HIGH (ref 7–23)
CALCIUM SERPL-MCNC: 8 MG/DL — LOW (ref 8.4–10.5)
CHLORIDE SERPL-SCNC: 99 MMOL/L — SIGNIFICANT CHANGE UP (ref 96–108)
CO2 SERPL-SCNC: 19 MMOL/L — LOW (ref 22–31)
CREAT SERPL-MCNC: 3.65 MG/DL — HIGH (ref 0.5–1.3)
FERRITIN SERPL-MCNC: 933 NG/ML — HIGH (ref 30–400)
FOLATE SERPL-MCNC: 4.9 NG/ML — SIGNIFICANT CHANGE UP
GLUCOSE SERPL-MCNC: 140 MG/DL — HIGH (ref 70–99)
HCT VFR BLD CALC: 24.6 % — LOW (ref 39–50)
HGB BLD-MCNC: 7.8 G/DL — LOW (ref 13–17)
INR BLD: 3.07 RATIO — HIGH (ref 0.88–1.16)
IRON SATN MFR SERPL: 26 % — SIGNIFICANT CHANGE UP (ref 16–55)
IRON SATN MFR SERPL: 37 UG/DL — LOW (ref 45–165)
MCHC RBC-ENTMCNC: 27 PG — SIGNIFICANT CHANGE UP (ref 27–34)
MCHC RBC-ENTMCNC: 31.7 GM/DL — LOW (ref 32–36)
MCV RBC AUTO: 85.1 FL — SIGNIFICANT CHANGE UP (ref 80–100)
PLATELET # BLD AUTO: 357 K/UL — SIGNIFICANT CHANGE UP (ref 150–400)
POTASSIUM SERPL-MCNC: 4.1 MMOL/L — SIGNIFICANT CHANGE UP (ref 3.5–5.3)
POTASSIUM SERPL-SCNC: 4.1 MMOL/L — SIGNIFICANT CHANGE UP (ref 3.5–5.3)
PROTHROM AB SERPL-ACNC: 35.9 SEC — HIGH (ref 10–13.1)
RBC # BLD: 2.89 M/UL — LOW (ref 4.2–5.8)
RBC # FLD: 15.8 % — HIGH (ref 10.3–14.5)
SODIUM SERPL-SCNC: 135 MMOL/L — SIGNIFICANT CHANGE UP (ref 135–145)
TIBC SERPL-MCNC: 141 UG/DL — LOW (ref 220–430)
UIBC SERPL-MCNC: 104 UG/DL — LOW (ref 110–370)
VIT B12 SERPL-MCNC: 351 PG/ML — SIGNIFICANT CHANGE UP (ref 232–1245)
WBC # BLD: 9.76 K/UL — SIGNIFICANT CHANGE UP (ref 3.8–10.5)
WBC # FLD AUTO: 9.76 K/UL — SIGNIFICANT CHANGE UP (ref 3.8–10.5)

## 2019-07-09 PROCEDURE — 99233 SBSQ HOSP IP/OBS HIGH 50: CPT

## 2019-07-09 PROCEDURE — 99232 SBSQ HOSP IP/OBS MODERATE 35: CPT

## 2019-07-09 RX ORDER — LANOLIN ALCOHOL/MO/W.PET/CERES
3 CREAM (GRAM) TOPICAL AT BEDTIME
Refills: 0 | Status: COMPLETED | OUTPATIENT
Start: 2019-07-09 | End: 2019-07-09

## 2019-07-09 RX ORDER — CEPHALEXIN 500 MG
250 CAPSULE ORAL DAILY
Refills: 0 | Status: DISCONTINUED | OUTPATIENT
Start: 2019-07-09 | End: 2019-07-10

## 2019-07-09 RX ORDER — SODIUM CHLORIDE 9 MG/ML
1000 INJECTION INTRAMUSCULAR; INTRAVENOUS; SUBCUTANEOUS
Refills: 0 | Status: DISCONTINUED | OUTPATIENT
Start: 2019-07-09 | End: 2019-07-10

## 2019-07-09 RX ADMIN — Medication 1 APPLICATION(S): at 06:10

## 2019-07-09 RX ADMIN — CEFTRIAXONE 100 MILLIGRAM(S): 500 INJECTION, POWDER, FOR SOLUTION INTRAMUSCULAR; INTRAVENOUS at 04:10

## 2019-07-09 RX ADMIN — TRAMADOL HYDROCHLORIDE 25 MILLIGRAM(S): 50 TABLET ORAL at 11:17

## 2019-07-09 RX ADMIN — Medication 1 APPLICATION(S): at 17:32

## 2019-07-09 RX ADMIN — Medication 10 MILLIGRAM(S): at 22:44

## 2019-07-09 RX ADMIN — Medication 975 MILLIGRAM(S): at 18:30

## 2019-07-09 RX ADMIN — LACTOBACILLUS ACIDOPH-L.BULGARICUS 1 MILLION CELL CHEWABLE TABLET 1 TABLET(S): at 11:17

## 2019-07-09 RX ADMIN — SODIUM CHLORIDE 50 MILLILITER(S): 9 INJECTION INTRAMUSCULAR; INTRAVENOUS; SUBCUTANEOUS at 15:49

## 2019-07-09 RX ADMIN — Medication 10 MILLIGRAM(S): at 13:46

## 2019-07-09 RX ADMIN — Medication 3 MILLIGRAM(S): at 23:22

## 2019-07-09 RX ADMIN — LIDOCAINE 1 PATCH: 4 CREAM TOPICAL at 23:15

## 2019-07-09 RX ADMIN — LIDOCAINE 1 PATCH: 4 CREAM TOPICAL at 11:17

## 2019-07-09 RX ADMIN — Medication 250 MILLIGRAM(S): at 17:31

## 2019-07-09 RX ADMIN — Medication 975 MILLIGRAM(S): at 06:58

## 2019-07-09 RX ADMIN — Medication 10 MILLIGRAM(S): at 06:10

## 2019-07-09 RX ADMIN — FINASTERIDE 5 MILLIGRAM(S): 5 TABLET, FILM COATED ORAL at 11:17

## 2019-07-09 RX ADMIN — Medication 25 MILLIGRAM(S): at 06:10

## 2019-07-09 RX ADMIN — TRAMADOL HYDROCHLORIDE 25 MILLIGRAM(S): 50 TABLET ORAL at 12:17

## 2019-07-09 RX ADMIN — Medication 975 MILLIGRAM(S): at 17:31

## 2019-07-09 RX ADMIN — Medication 975 MILLIGRAM(S): at 06:08

## 2019-07-09 RX ADMIN — SODIUM CHLORIDE 50 MILLILITER(S): 9 INJECTION INTRAMUSCULAR; INTRAVENOUS; SUBCUTANEOUS at 06:56

## 2019-07-09 RX ADMIN — LIDOCAINE 1 PATCH: 4 CREAM TOPICAL at 01:01

## 2019-07-09 RX ADMIN — Medication 25 MILLIGRAM(S): at 17:31

## 2019-07-09 RX ADMIN — LIDOCAINE 1 PATCH: 4 CREAM TOPICAL at 19:55

## 2019-07-09 NOTE — PROGRESS NOTE ADULT - GASTROINTESTINAL DETAILS
soft/no rebound tenderness/no rigidity/no distention/nontender/no guarding
soft/no distention/no guarding/no rebound tenderness/nontender

## 2019-07-09 NOTE — PROGRESS NOTE ADULT - RS GEN PE MLT RESP DETAILS PC
good air movement/clear to auscultation bilaterally/respirations non-labored
good air movement/clear to auscultation bilaterally/respirations non-labored

## 2019-07-09 NOTE — PROGRESS NOTE ADULT - PROBLEM SELECTOR PLAN 9
- Daughter reports pt sees Dr Hughes nephrologist as an outpt, reports she was scheduled to get epo as outpt   - epo per renal

## 2019-07-09 NOTE — PROGRESS NOTE ADULT - NEGATIVE GASTROINTESTINAL SYMPTOMS
no nausea/no vomiting/no abdominal pain/no diarrhea
no nausea/no vomiting/no diarrhea/no abdominal pain

## 2019-07-09 NOTE — PROGRESS NOTE ADULT - PROBLEM SELECTOR PLAN 1
- elevated ESR/CRP cellulitis with possible component of tenosynovitis,  - RUE DVT ruled out   - c/w ceftriaxone till 7/9  - kelflex for till 7/11 per ID  - continue to elevate arm above heart level  - BCx (7/2): NGTD x 2  - s/p CT RUE: consistent with cellulitis

## 2019-07-10 ENCOUNTER — TRANSCRIPTION ENCOUNTER (OUTPATIENT)
Age: 84
End: 2019-07-10

## 2019-07-10 VITALS
SYSTOLIC BLOOD PRESSURE: 157 MMHG | DIASTOLIC BLOOD PRESSURE: 79 MMHG | OXYGEN SATURATION: 97 % | RESPIRATION RATE: 18 BRPM | HEART RATE: 87 BPM

## 2019-07-10 LAB
ANION GAP SERPL CALC-SCNC: 14 MMOL/L — SIGNIFICANT CHANGE UP (ref 5–17)
BUN SERPL-MCNC: 101 MG/DL — HIGH (ref 7–23)
CALCIUM SERPL-MCNC: 8.6 MG/DL — SIGNIFICANT CHANGE UP (ref 8.4–10.5)
CHLORIDE SERPL-SCNC: 106 MMOL/L — SIGNIFICANT CHANGE UP (ref 96–108)
CO2 SERPL-SCNC: 19 MMOL/L — LOW (ref 22–31)
CREAT SERPL-MCNC: 3.45 MG/DL — HIGH (ref 0.5–1.3)
GLUCOSE SERPL-MCNC: 194 MG/DL — HIGH (ref 70–99)
HCT VFR BLD CALC: 25.2 % — LOW (ref 39–50)
HGB BLD-MCNC: 7.9 G/DL — LOW (ref 13–17)
INR BLD: 3.46 RATIO — HIGH (ref 0.88–1.16)
MCHC RBC-ENTMCNC: 26.8 PG — LOW (ref 27–34)
MCHC RBC-ENTMCNC: 31.3 GM/DL — LOW (ref 32–36)
MCV RBC AUTO: 85.4 FL — SIGNIFICANT CHANGE UP (ref 80–100)
PLATELET # BLD AUTO: 396 K/UL — SIGNIFICANT CHANGE UP (ref 150–400)
POTASSIUM SERPL-MCNC: 4.4 MMOL/L — SIGNIFICANT CHANGE UP (ref 3.5–5.3)
POTASSIUM SERPL-SCNC: 4.4 MMOL/L — SIGNIFICANT CHANGE UP (ref 3.5–5.3)
PROTHROM AB SERPL-ACNC: 41 SEC — HIGH (ref 10–13.1)
RBC # BLD: 2.95 M/UL — LOW (ref 4.2–5.8)
RBC # FLD: 15.9 % — HIGH (ref 10.3–14.5)
SODIUM SERPL-SCNC: 139 MMOL/L — SIGNIFICANT CHANGE UP (ref 135–145)
WBC # BLD: 11.34 K/UL — HIGH (ref 3.8–10.5)
WBC # FLD AUTO: 11.34 K/UL — HIGH (ref 3.8–10.5)

## 2019-07-10 PROCEDURE — 85027 COMPLETE CBC AUTOMATED: CPT

## 2019-07-10 PROCEDURE — 85730 THROMBOPLASTIN TIME PARTIAL: CPT

## 2019-07-10 PROCEDURE — 97116 GAIT TRAINING THERAPY: CPT

## 2019-07-10 PROCEDURE — 97161 PT EVAL LOW COMPLEX 20 MIN: CPT

## 2019-07-10 PROCEDURE — 93971 EXTREMITY STUDY: CPT

## 2019-07-10 PROCEDURE — 82607 VITAMIN B-12: CPT

## 2019-07-10 PROCEDURE — 83540 ASSAY OF IRON: CPT

## 2019-07-10 PROCEDURE — 83550 IRON BINDING TEST: CPT

## 2019-07-10 PROCEDURE — 87040 BLOOD CULTURE FOR BACTERIA: CPT

## 2019-07-10 PROCEDURE — 84550 ASSAY OF BLOOD/URIC ACID: CPT

## 2019-07-10 PROCEDURE — 82728 ASSAY OF FERRITIN: CPT

## 2019-07-10 PROCEDURE — 86901 BLOOD TYPING SEROLOGIC RH(D): CPT

## 2019-07-10 PROCEDURE — 97530 THERAPEUTIC ACTIVITIES: CPT

## 2019-07-10 PROCEDURE — 85610 PROTHROMBIN TIME: CPT

## 2019-07-10 PROCEDURE — 73200 CT UPPER EXTREMITY W/O DYE: CPT

## 2019-07-10 PROCEDURE — 84300 ASSAY OF URINE SODIUM: CPT

## 2019-07-10 PROCEDURE — 80053 COMPREHEN METABOLIC PANEL: CPT

## 2019-07-10 PROCEDURE — 73030 X-RAY EXAM OF SHOULDER: CPT

## 2019-07-10 PROCEDURE — 80048 BASIC METABOLIC PNL TOTAL CA: CPT

## 2019-07-10 PROCEDURE — 82746 ASSAY OF FOLIC ACID SERUM: CPT

## 2019-07-10 PROCEDURE — 73020 X-RAY EXAM OF SHOULDER: CPT

## 2019-07-10 PROCEDURE — 84100 ASSAY OF PHOSPHORUS: CPT

## 2019-07-10 PROCEDURE — 86900 BLOOD TYPING SEROLOGIC ABO: CPT

## 2019-07-10 PROCEDURE — 86850 RBC ANTIBODY SCREEN: CPT

## 2019-07-10 PROCEDURE — 83735 ASSAY OF MAGNESIUM: CPT

## 2019-07-10 PROCEDURE — 86140 C-REACTIVE PROTEIN: CPT

## 2019-07-10 PROCEDURE — 99239 HOSP IP/OBS DSCHRG MGMT >30: CPT

## 2019-07-10 PROCEDURE — 99285 EMERGENCY DEPT VISIT HI MDM: CPT

## 2019-07-10 PROCEDURE — 82570 ASSAY OF URINE CREATININE: CPT

## 2019-07-10 PROCEDURE — 85652 RBC SED RATE AUTOMATED: CPT

## 2019-07-10 RX ORDER — LACTOBACILLUS ACIDOPH-L.BULGARICUS 1 MILLION CELL CHEWABLE TABLET 1MM CELL
1 TABLET,CHEWABLE ORAL
Qty: 0 | Refills: 0 | DISCHARGE
Start: 2019-07-10

## 2019-07-10 RX ORDER — CEPHALEXIN 500 MG
1 CAPSULE ORAL
Qty: 0 | Refills: 0 | DISCHARGE
Start: 2019-07-10

## 2019-07-10 RX ADMIN — LACTOBACILLUS ACIDOPH-L.BULGARICUS 1 MILLION CELL CHEWABLE TABLET 1 TABLET(S): at 13:33

## 2019-07-10 RX ADMIN — Medication 975 MILLIGRAM(S): at 05:33

## 2019-07-10 RX ADMIN — TRAMADOL HYDROCHLORIDE 25 MILLIGRAM(S): 50 TABLET ORAL at 13:38

## 2019-07-10 RX ADMIN — Medication 1 APPLICATION(S): at 05:34

## 2019-07-10 RX ADMIN — LIDOCAINE 1 PATCH: 4 CREAM TOPICAL at 13:31

## 2019-07-10 RX ADMIN — Medication 10 MILLIGRAM(S): at 05:33

## 2019-07-10 RX ADMIN — Medication 975 MILLIGRAM(S): at 06:20

## 2019-07-10 RX ADMIN — Medication 250 MILLIGRAM(S): at 13:32

## 2019-07-10 RX ADMIN — Medication 10 MILLIGRAM(S): at 13:32

## 2019-07-10 RX ADMIN — FINASTERIDE 5 MILLIGRAM(S): 5 TABLET, FILM COATED ORAL at 13:32

## 2019-07-10 RX ADMIN — Medication 25 MILLIGRAM(S): at 05:33

## 2019-07-10 NOTE — DISCHARGE NOTE NURSING/CASE MANAGEMENT/SOCIAL WORK - NSDCDPATPORTLINK_GEN_ALL_CORE
You can access the [facility] Patient Portal, offered by [facility], by registering with the following website: [email or web address]

## 2019-07-10 NOTE — PROGRESS NOTE ADULT - PROBLEM SELECTOR PLAN 4
Lungs clear, stable to improved b/l LE edema, does not appear in acute decompensation  - c/w metoprolol
- Lungs remain clear, stable LE edema, does not appear in acute decompensation  - c/w metoprolol

## 2019-07-10 NOTE — PROGRESS NOTE ADULT - PROBLEM SELECTOR PLAN 7
- Hand tenderness likely 2/2 edema/cellulitis vs gout. Previously on febuxostat. Uric acid wnl.  - Tylenol PRN, avoid nsaids in setting of CKD
Hand tenderness likely 2/2 edema vs gout. Previously on febuxostat. Uric acid wnl.  - tylenol PRN, avoid nsaids in setting of CKD
- Hand tenderness likely 2/2 edema/cellulitis vs gout. Previously on febuxostat. Uric acid wnl.  - Tylenol PRN, avoid nsaids in setting of CKD
- Hand tenderness likely 2/2 edema/cellulitis vs gout. Previously on febuxostat. Uric acid wnl.  - Tylenol PRN, avoid nsaids in setting of CKD
- Hand tenderness likely 2/2 edema/cellulitis vs gout. Previously on febuxostat. Uric acid wnl.  - Tylenol, tramadol , avoid nsaids in setting of CKD

## 2019-07-10 NOTE — PROGRESS NOTE ADULT - PROVIDER SPECIALTY LIST ADULT
Hospitalist
Infectious Disease
Internal Medicine
Hospitalist

## 2019-07-10 NOTE — PROGRESS NOTE ADULT - PROBLEM SELECTOR PLAN 10
- DVT ppx: on coumadin  - Diet: DASH  - PT eval: SATISH

## 2019-07-10 NOTE — PROGRESS NOTE ADULT - ATTENDING COMMENTS
Felice Samano MD  Pager [phone]  After 5pm/weekends call [phone]
Jose Lopez  Attending Physician   Division of Infectious Disease  Pager #[phone]  After 5pm/weekend or no response, call #[phone]    Will sign off, recall ID if needed #[phone].
D/w daughter updated on full plan of care
D/w daughter updated on full plan of care all questions answered   Advised to hold coumadin tonight and to check INR in am as documented in dc summary   Discharge time 50 min
Jose Lopez  Attending Physician   Division of Infectious Disease  Pager #[phone]  After 5pm/weekend or no response, call #[phone]

## 2019-07-10 NOTE — PROGRESS NOTE ADULT - PROBLEM SELECTOR PROBLEM 1
Cellulitis of right upper extremity

## 2019-07-10 NOTE — PROGRESS NOTE ADULT - PROBLEM SELECTOR PLAN 3
Rate controlled, PPM for tachybrady synd  - supratherapeutic INR 5.69, INR trending up, coumadin on hold since admission  qhs, no signs of active bleeding, Hb downtrending from 9.1 to 8.3, to hold coumadin tonight, will given vit K x1 today   - c/w metoprolol  - Trend PT/INR daily
- Rate controlled, PPM for tachybrady syndrome  - patient with supratherapeutic INR  - hold coumadin tonight   - c/w metoprolol  - Trend PT/INR daily
- Rate controlled, PPM for tachybrady syndrome  - patient with supratherapeutic INR since admission s/p Vitamin K  - coumadin 1mg tonight   - c/w metoprolol  - Trend PT/INR daily
- Rate controlled, PPM for tachybrady syndrome  - patient with supratherapeutic INR since admission s/p Vitamin K  - hold coumadin tonight   - c/w metoprolol  - Trend PT/INR daily
- Rate controlled, PPM for tachybrady syndrome  - patient with supratherapeutic INR since admission s/p Vitamin K, INR now 1.70 - Will resume coumadin 2.5 mg tonight  - c/w metoprolol  - Trend PT/INR daily
- Rate controlled, PPM for tachybrady syndrome  - patient with supratherapeutic INR since admission s/p Vitamin K, INR pending this morning, 1.7 yesterday , c/w coumadin 2.5 mg tonight  - c/w metoprolol  - Trend PT/INR daily
- Rate controlled, PPM for tachybrady syndrome  - patient with supratherapeutic INR since admission s/p Vitamin K, INR now 1.93. Will resume coumadin 2.5 mg tonight  - c/w metoprolol  - Trend PT/INR daily

## 2019-07-10 NOTE — PROGRESS NOTE ADULT - PROBLEM SELECTOR PLAN 1
- RUE DVT ruled out   - s/p ceftriaxone till 7/9  - kelflex for till 7/11 per ID  - continue to elevate arm above heart level  - BCx (7/2): NGTD x 2  - s/p CT RUE: consistent with cellulitis  - warm compresses to area

## 2019-07-10 NOTE — PROGRESS NOTE ADULT - REASON FOR ADMISSION
RUE swelling and erythema

## 2019-07-10 NOTE — PROGRESS NOTE ADULT - PROBLEM SELECTOR PLAN 8
- Stable  - c/w finasteride
Stable  - c/w finasteride
- Stable  - c/w finasteride

## 2019-07-10 NOTE — PROGRESS NOTE ADULT - PROBLEM SELECTOR PLAN 2
- Could be 2/2 muscle strain. R shoulder xr negative for dislocation or fracture. Pt has some difficulty with full active ROM in R shoulder. Can perform passive ROM in R shoulder.  - tylenol 975mg q12, lidocaine patch, tramadol 25mg q8 prn
Could be 2/2 muscle strain. R shoulder prelim XR negative for dislocation or fracture. Pt has some difficulty with full active ROM in R shoulder. Can perform passive ROM in R shoulder.  - tylenol PRN

## 2019-07-10 NOTE — PROGRESS NOTE ADULT - PROBLEM SELECTOR PLAN 5
- Stable  - monitor BMP, avoid nephrotoxins, renally dose meds
Stable  - monitor BMP, avoid nephrotoxins, renally dose meds
- Cr worse today 3.75, now with high anion gap metabolic acidosis   - Pt with Dry mucus membranes  -  ECHO 2018: EF75%, with moderate AS.  - obtain urine lytes   - start NS 75ml/hr   - monitor BMP, avoid nephrotoxins, renally dose meds
- Pt with Dry mucus membranes  -  ECHO 2018: EF75%, with moderate AS.  -  NS 50ml/hr for 24 hrs    - monitor BMP, avoid nephrotoxins, renally dose meds  - Hold losartan
- Stable  - monitor BMP, avoid nephrotoxins, renally dose meds

## 2019-07-10 NOTE — PROGRESS NOTE ADULT - SUBJECTIVE AND OBJECTIVE BOX
CC: Patient is a 88y old  Male who presents with a chief complaint of RUE swelling and erythema (06 Jul 2019 12:13)    ID following for RUE swelling/ cellulitis    Interval History/ROS: Patient remains with RUE swelling, pain. No fevers. Leukocytosis improving. CT with cellulitis of RUE.    Rest of ROS negative.    Allergies  No Known Allergies    ANTIMICROBIALS:  cefTRIAXone   IVPB    cefTRIAXone   IVPB 1000 every 24 hours    OTHER MEDS:  acetaminophen   Tablet .. 650 milliGRAM(s) Oral every 4 hours PRN  docusate sodium 100 milliGRAM(s) Oral three times a day  finasteride 5 milliGRAM(s) Oral daily  hydrALAZINE 10 milliGRAM(s) Oral every 8 hours  lactobacillus acidophilus and bulgaricus Chewable 1 Tablet(s) Chew daily  losartan 25 milliGRAM(s) Oral daily  metoprolol tartrate 25 milliGRAM(s) Oral two times a day  polyethylene glycol 3350 17 Gram(s) Oral daily  sodium chloride 0.9%. 1000 milliLiter(s) IV Continuous <Continuous>    PE:    Vital Signs Last 24 Hrs  T(C): 36.4 (07 Jul 2019 05:06), Max: 36.9 (06 Jul 2019 20:55)  T(F): 97.5 (07 Jul 2019 05:06), Max: 98.4 (06 Jul 2019 20:55)  HR: 90 (07 Jul 2019 10:39) (74 - 985)  BP: 97/60 (07 Jul 2019 10:39) (97/60 - 121/65)  BP(mean): --  RR: 18 (07 Jul 2019 05:06) (18 - 18)  SpO2: 100% (07 Jul 2019 05:06) (98% - 100%)    Gen: AOx3, NAD, non-toxic  CV: S1+S2 normal, + murmur  Resp: Clear bilat, no resp distress  Abd: Soft, nontender, +BS  Ext: RUE swelling, LE swelling  : No Neil  IV/Skin: No thrombophlebitis  Neuro: no focal deficits    LABS:                          7.5    10.97 )-----------( 330      ( 07 Jul 2019 09:03 )             23.8       07-07    136  |  98  |  104<H>  ----------------------------<  156<H>  3.8   |  20<L>  |  3.76<H>    Ca    8.5      07 Jul 2019 07:20  Phos  3.9     07-06  Mg     1.7     07-06    MICROBIOLOGY:  v  .Blood  07-02-19   No growth to date.  --  --      .Urine  06-08-19   <10,000 CFU/mL Normal Urogenital Wendi  --  --    RADIOLOGY:  < from: CT Upper Extremity No Cont, Right (07.06.19 @ 17:51) >  Impression:    Extensive cellulitis extending from the mid shaft of the humerus to the   wrist which is most prominent along the dorsum of the forearm. There is   evidence of mild intramuscular myofascial edema about both the anterior   and posterior compartments of the forearm. There is no subcutaneous air.   There is no CT evidence of osteomyelitis.    Nonspecific extensive mineralization within the posterior soft tissues   extending from the level of the olecranon process distally along the   distal shaft of the ulna. This may be related to the sequela of prior   inflammatory or posttraumatic processes.    < end of copied text >
LACEY SHAW 88y [id number]    Patient is a 88y old  Male who presents with a chief complaint of RUE swelling and erythema (08 Jul 2019 14:41)      Follow Up/CC:  ID following for right arm swelling    Interval History/ROS: arm less swollen    Allergies    No Known Allergies    Intolerances        ANTIMICROBIALS:  cefTRIAXone   IVPB    cefTRIAXone   IVPB 1000 every 24 hours      MEDICATIONS  (STANDING):  acetaminophen   Tablet .. 975 milliGRAM(s) Oral every 12 hours  cefTRIAXone   IVPB      cefTRIAXone   IVPB 1000 milliGRAM(s) IV Intermittent every 24 hours  epoetin laz Injectable 86033 Unit(s) SubCutaneous every 7 days  finasteride 5 milliGRAM(s) Oral daily  hydrALAZINE 10 milliGRAM(s) Oral every 8 hours  lactobacillus acidophilus and bulgaricus Chewable 1 Tablet(s) Chew daily  lidocaine   Patch 1 Patch Transdermal daily  metoprolol tartrate 25 milliGRAM(s) Oral two times a day  sodium chloride 0.9%. 1000 milliLiter(s) (50 mL/Hr) IV Continuous <Continuous>  warfarin 1 milliGRAM(s) Oral once    MEDICATIONS  (PRN):  simethicone 80 milliGRAM(s) Chew every 4 hours PRN Gas  traMADol 25 milliGRAM(s) Oral every 8 hours PRN Severe Pain (7 - 10)        Vital Signs Last 24 Hrs  T(C): 36.3 (08 Jul 2019 16:38), Max: 36.7 (07 Jul 2019 20:36)  T(F): 97.4 (08 Jul 2019 16:38), Max: 98 (07 Jul 2019 20:36)  HR: 77 (08 Jul 2019 16:38) (61 - 81)  BP: 114/63 (08 Jul 2019 16:38) (100/62 - 114/63)  BP(mean): --  RR: 18 (08 Jul 2019 16:38) (17 - 18)  SpO2: 95% (08 Jul 2019 16:38) (95% - 99%)    CBC Full  -  ( 08 Jul 2019 07:41 )  WBC Count : 9.22 K/uL  RBC Count : 2.70 M/uL  Hemoglobin : 7.2 g/dL  Hematocrit : 22.9 %  Platelet Count - Automated : 308 K/uL  Mean Cell Volume : 84.8 fl  Mean Cell Hemoglobin : 26.7 pg  Mean Cell Hemoglobin Concentration : 31.4 gm/dL  Auto Neutrophil # : x  Auto Lymphocyte # : x  Auto Monocyte # : x  Auto Eosinophil # : x  Auto Basophil # : x  Auto Neutrophil % : x  Auto Lymphocyte % : x  Auto Monocyte % : x  Auto Eosinophil % : x  Auto Basophil % : x    07-08    137  |  99  |  109<H>  ----------------------------<  186<H>  4.0   |  20<L>  |  3.71<H>    Ca    8.1<L>      08 Jul 2019 06:37            MICROBIOLOGY:  .Blood  07-02-19   No growth at 5 days.  --  --      RADIOLOGY    < from: CT Upper Extremity No Cont, Right (07.06.19 @ 17:51) >  Extensive cellulitis extending from the mid shaft of the humerus to the   wrist which is most prominent along the dorsum of the forearm. There is   evidence of mild intramuscular myofascial edema about both the anterior   and posterior compartments of the forearm. There is no subcutaneous air.   There is no CT evidence of osteomyelitis.    Nonspecific extensive mineralization within the posterior soft tissues   extending from the level of the olecranon process distally along the   distal shaft of the ulna. This may be related to the sequela of prior   inflammatory or posttraumatic processes.    < end of copied text >
Patient is a 88y old  Male who presents with a chief complaint of RUE swelling and erythema (05 Jul 2019 13:48)      SUBJECTIVE / OVERNIGHT EVENTS: Patient seen and examined at bedside. States his R arm is improving, denies any CP, SOB, abd pain and n/v.     ROS:  All other review of systems negative    Allergies    No Known Allergies    Intolerances        MEDICATIONS  (STANDING):  cefTRIAXone   IVPB      cefTRIAXone   IVPB 1000 milliGRAM(s) IV Intermittent every 24 hours  docusate sodium 100 milliGRAM(s) Oral three times a day  finasteride 5 milliGRAM(s) Oral daily  hydrALAZINE 10 milliGRAM(s) Oral every 8 hours  lactobacillus acidophilus and bulgaricus Chewable 1 Tablet(s) Chew daily  losartan 25 milliGRAM(s) Oral daily  metoprolol tartrate 25 milliGRAM(s) Oral two times a day  polyethylene glycol 3350 17 Gram(s) Oral daily    MEDICATIONS  (PRN):  acetaminophen   Tablet .. 650 milliGRAM(s) Oral every 4 hours PRN Mild Pain (1 - 3)      Vital Signs Last 24 Hrs  T(C): 36.8 (06 Jul 2019 11:49), Max: 36.8 (06 Jul 2019 11:49)  T(F): 98.3 (06 Jul 2019 11:49), Max: 98.3 (06 Jul 2019 11:49)  HR: 985 (06 Jul 2019 11:49) (78 - 985)  BP: 121/65 (06 Jul 2019 11:49) (102/63 - 148/74)  BP(mean): --  RR: 18 (06 Jul 2019 11:49) (18 - 18)  SpO2: 99% (06 Jul 2019 11:49) (97% - 99%)  CAPILLARY BLOOD GLUCOSE        I&O's Summary    05 Jul 2019 07:01  -  06 Jul 2019 07:00  --------------------------------------------------------  IN: 610 mL / OUT: 801 mL / NET: -191 mL        PHYSICAL EXAM:  GENERAL: NAD, well-developed  HEAD:  Atraumatic, Normocephalic  EYES: EOMI, PERRLA, conjunctiva and sclera clear  NECK: Supple, No JVD  CHEST/LUNG: Clear to auscultation bilaterally; No wheeze  HEART: Regular rate and rhythm; No murmurs, rubs, or gallops  ABDOMEN: Soft, Nontender, Nondistended; Bowel sounds present  EXTREMITIES:  2+ Peripheral Pulses, No clubbing, cyanosis; 2-3+ pedal edema, pitting. RUE with + edema; erythema on posterior forearm to elbow  NEUROLOGY: AAOx3, non-focal      LABS:                        8.0    13.61 )-----------( 335      ( 06 Jul 2019 11:02 )             24.7     07-06    135  |  98  |  97<H>  ----------------------------<  200<H>  3.7   |  21<L>  |  3.21<H>    Ca    8.6      06 Jul 2019 07:25  Phos  3.9     07-06  Mg     1.7     07-06      PT/INR - ( 06 Jul 2019 10:09 )   PT: 20.0 sec;   INR: 1.73 ratio                   RADIOLOGY & ADDITIONAL TESTS:    Consultant(s) Notes Reviewed:  ID rec noted     Care Discussed with Consultants/Other Providers: Medicine NP     Case Discussed with Gabe Nieves's dtr
Patient is a 88y old  Male who presents with a chief complaint of RUE swelling and erythema (07 Jul 2019 11:47)      SUBJECTIVE / OVERNIGHT EVENTS: Patient seen and examined at bedside. He states his arm pain and swelling is better. Denies any CP, SOB, abd pain and n/v.     ROS:  All other review of systems negative    Allergies    No Known Allergies    Intolerances        MEDICATIONS  (STANDING):  cefTRIAXone   IVPB      cefTRIAXone   IVPB 1000 milliGRAM(s) IV Intermittent every 24 hours  docusate sodium 100 milliGRAM(s) Oral three times a day  finasteride 5 milliGRAM(s) Oral daily  hydrALAZINE 10 milliGRAM(s) Oral every 8 hours  lactobacillus acidophilus and bulgaricus Chewable 1 Tablet(s) Chew daily  losartan 25 milliGRAM(s) Oral daily  metoprolol tartrate 25 milliGRAM(s) Oral two times a day  polyethylene glycol 3350 17 Gram(s) Oral daily  sodium chloride 0.9%. 1000 milliLiter(s) (75 mL/Hr) IV Continuous <Continuous>    MEDICATIONS  (PRN):  acetaminophen   Tablet .. 650 milliGRAM(s) Oral every 4 hours PRN Mild Pain (1 - 3)      Vital Signs Last 24 Hrs  T(C): 36.4 (07 Jul 2019 05:06), Max: 36.9 (06 Jul 2019 20:55)  T(F): 97.5 (07 Jul 2019 05:06), Max: 98.4 (06 Jul 2019 20:55)  HR: 90 (07 Jul 2019 10:39) (74 - 90)  BP: 97/60 (07 Jul 2019 10:39) (97/60 - 118/64)  BP(mean): --  RR: 18 (07 Jul 2019 05:06) (18 - 18)  SpO2: 100% (07 Jul 2019 05:06) (98% - 100%)  CAPILLARY BLOOD GLUCOSE        I&O's Summary    06 Jul 2019 07:01  -  07 Jul 2019 07:00  --------------------------------------------------------  IN: 770 mL / OUT: 226 mL / NET: 544 mL        PHYSICAL EXAM:  GENERAL: NAD,dry mucus membranes   HEAD:  Atraumatic, Normocephalic  EYES: EOMI, PERRLA, conjunctiva and sclera clear  NECK: Supple, No JVD  CHEST/LUNG: Clear to auscultation bilaterally; No wheeze  HEART: irregularly irregular rhythm; +systolic murmur aortic region, rubs, or gallops  ABDOMEN: Soft, Nontender, Nondistended; Bowel sounds present  EXTREMITIES:  2+ Peripheral Pulses,  RUE with + edema; erythema on posterior forearm to elbow      LABS:                        7.5    10.97 )-----------( 330      ( 07 Jul 2019 09:03 )             23.8     07-07    136  |  98  |  104<H>  ----------------------------<  156<H>  3.8   |  20<L>  |  3.76<H>    Ca    8.5      07 Jul 2019 07:20  Phos  3.9     07-06  Mg     1.7     07-06      PT/INR - ( 06 Jul 2019 10:09 )   PT: 20.0 sec;   INR: 1.73 ratio                   RADIOLOGY & ADDITIONAL TESTS:    Imaging Personally Reviewed: CT RUE: reviewed cellulitis     Consultant(s) Notes Reviewed:  ID rec noted     Care Discussed with Consultants/Other Providers: Medicine NP
Patient is a 88y old  Male who presents with a chief complaint of RUE swelling and erythema (07 Jul 2019 12:03)      SUBJECTIVE / OVERNIGHT EVENTS: Feels ok, right arm pain and swelling is better, no cp, abdominal pain     MEDICATIONS  (STANDING):  acetaminophen   Tablet .. 975 milliGRAM(s) Oral every 12 hours  cefTRIAXone   IVPB      cefTRIAXone   IVPB 1000 milliGRAM(s) IV Intermittent every 24 hours  finasteride 5 milliGRAM(s) Oral daily  hydrALAZINE 10 milliGRAM(s) Oral every 8 hours  lactobacillus acidophilus and bulgaricus Chewable 1 Tablet(s) Chew daily  lidocaine   Patch 1 Patch Transdermal daily  metoprolol tartrate 25 milliGRAM(s) Oral two times a day  sodium chloride 0.9%. 1000 milliLiter(s) (50 mL/Hr) IV Continuous <Continuous>  warfarin 1 milliGRAM(s) Oral once    MEDICATIONS  (PRN):  simethicone 80 milliGRAM(s) Chew every 4 hours PRN Gas  traMADol 25 milliGRAM(s) Oral every 8 hours PRN Severe Pain (7 - 10)        CAPILLARY BLOOD GLUCOSE        I&O's Summary    07 Jul 2019 07:01  -  08 Jul 2019 07:00  --------------------------------------------------------  IN: 530 mL / OUT: 200 mL / NET: 330 mL    08 Jul 2019 07:01  -  08 Jul 2019 12:54  --------------------------------------------------------  IN: 600 mL / OUT: 0 mL / NET: 600 mL        PHYSICAL EXAM:  GENERAL: NAD, frail   HEAD:  Atraumatic, Normocephalic  EYES: conjunctiva and sclera clear  NECK:  No JVD  CHEST/LUNG: Clear to auscultation bilaterally; No wheeze  HEART: Regular rate and rhythm; S1S2  ABDOMEN: Soft, Nontender, Nondistended; Bowel sounds present  EXTREMITIES:  RUE swelling, erythema   PSYCH: AAOx3  NEUROLOGY: non-focal    LABS:                        7.2    9.22  )-----------( 308      ( 08 Jul 2019 07:41 )             22.9     07-08    137  |  99  |  109<H>  ----------------------------<  186<H>  4.0   |  20<L>  |  3.71<H>    Ca    8.1<L>      08 Jul 2019 06:37      PT/INR - ( 08 Jul 2019 08:15 )   PT: 33.1 sec;   INR: 2.81 ratio         PTT - ( 08 Jul 2019 08:15 )  PTT:35.0 sec          RADIOLOGY & ADDITIONAL TESTS:    Imaging Personally Reviewed:    Consultant(s) Notes Reviewed:      Care Discussed with Consultants/Other Providers:
Patient is a 88y old  Male who presents with a chief complaint of RUE swelling and erythema (09 Jul 2019 11:08)      SUBJECTIVE / OVERNIGHT EVENTS: feels better, rue pain, swelling, redness is better, no cp, osb     MEDICATIONS  (STANDING):  acetaminophen   Tablet .. 975 milliGRAM(s) Oral every 12 hours  AQUAPHOR (petrolatum Ointment) 1 Application(s) Topical two times a day  cephalexin 250 milliGRAM(s) Oral daily  epoetin laz Injectable 35791 Unit(s) SubCutaneous every 7 days  finasteride 5 milliGRAM(s) Oral daily  hydrALAZINE 10 milliGRAM(s) Oral every 8 hours  lactobacillus acidophilus and bulgaricus Chewable 1 Tablet(s) Chew daily  lidocaine   Patch 1 Patch Transdermal daily  metoprolol tartrate 25 milliGRAM(s) Oral two times a day  sodium chloride 0.9%. 1000 milliLiter(s) (50 mL/Hr) IV Continuous <Continuous>    MEDICATIONS  (PRN):  simethicone 80 milliGRAM(s) Chew every 4 hours PRN Gas  traMADol 25 milliGRAM(s) Oral every 8 hours PRN Severe Pain (7 - 10)        CAPILLARY BLOOD GLUCOSE        I&O's Summary    08 Jul 2019 07:01  -  09 Jul 2019 07:00  --------------------------------------------------------  IN: 2750 mL / OUT: 0 mL / NET: 2750 mL    09 Jul 2019 07:01  -  09 Jul 2019 15:14  --------------------------------------------------------  IN: 240 mL / OUT: 150 mL / NET: 90 mL        PHYSICAL EXAM:  GENERAL: NAD, frail   HEAD:  Atraumatic, Normocephalic  EYES:  conjunctiva and sclera clear  NECK:  No JVD  CHEST/LUNG: Clear to auscultation bilaterally; No wheeze  HEART: Regular rate and rhythm; S1S2  ABDOMEN: Soft, Nontender, Nondistended; Bowel sounds present  EXTREMITIES:  +1LE edema   PSYCH: AAOx3  SKIN: No rashes or lesions    LABS:                        7.8    9.76  )-----------( 357      ( 09 Jul 2019 08:46 )             24.6     07-09    135  |  99  |  101<H>  ----------------------------<  140<H>  4.1   |  19<L>  |  3.65<H>    Ca    8.0<L>      09 Jul 2019 07:27      PT/INR - ( 09 Jul 2019 08:24 )   PT: 35.9 sec;   INR: 3.07 ratio         PTT - ( 08 Jul 2019 08:15 )  PTT:35.0 sec          RADIOLOGY & ADDITIONAL TESTS:    Imaging Personally Reviewed:    Consultant(s) Notes Reviewed:      Care Discussed with Consultants/Other Providers:
Patient is a 88y old  Male who presents with a chief complaint of RUE swelling and erythema (09 Jul 2019 15:14)      SUBJECTIVE / OVERNIGHT EVENTS: feels better, still with some swelling in the right arm but better,no cp, sob , chills    MEDICATIONS  (STANDING):  acetaminophen   Tablet .. 975 milliGRAM(s) Oral every 12 hours  AQUAPHOR (petrolatum Ointment) 1 Application(s) Topical two times a day  cephalexin 250 milliGRAM(s) Oral daily  epoetin laz Injectable 07591 Unit(s) SubCutaneous every 7 days  finasteride 5 milliGRAM(s) Oral daily  hydrALAZINE 10 milliGRAM(s) Oral every 8 hours  lactobacillus acidophilus and bulgaricus Chewable 1 Tablet(s) Chew daily  lidocaine   Patch 1 Patch Transdermal daily  metoprolol tartrate 25 milliGRAM(s) Oral two times a day    MEDICATIONS  (PRN):  simethicone 80 milliGRAM(s) Chew every 4 hours PRN Gas  traMADol 25 milliGRAM(s) Oral every 8 hours PRN Severe Pain (7 - 10)        CAPILLARY BLOOD GLUCOSE        I&O's Summary    09 Jul 2019 07:01  -  10 Jul 2019 07:00  --------------------------------------------------------  IN: 1440 mL / OUT: 1150 mL / NET: 290 mL    10 Jul 2019 07:01  -  10 Jul 2019 14:20  --------------------------------------------------------  IN: 240 mL / OUT: 250 mL / NET: -10 mL        PHYSICAL EXAM:  GENERAL: NAD, well-developed  HEAD:  Atraumatic, Normocephalic  EYES: conjunctiva and sclera clear  NECK:  No JVD  CHEST/LUNG: Clear to auscultation bilaterally; No wheeze  HEART: Regular rate and rhythm; S1S2  ABDOMEN: Soft, Nontender, Nondistended; Bowel sounds present  EXTREMITIES:  RUE +1 edema   PSYCH: AAOx3  NEUROLOGY: non-focal  SKIN: No rashes or lesions    LABS:                        7.9    11.34 )-----------( 396      ( 10 Jul 2019 12:52 )             25.2     07-10    139  |  106  |  101<H>  ----------------------------<  194<H>  4.4   |  19<L>  |  3.45<H>    Ca    8.6      10 Jul 2019 09:09      PT/INR - ( 10 Jul 2019 12:47 )   PT: 41.0 sec;   INR: 3.46 ratio                   RADIOLOGY & ADDITIONAL TESTS:    Imaging Personally Reviewed:    Consultant(s) Notes Reviewed:      Care Discussed with Consultants/Other Providers:
LACEY SHAW 88y [id number]    Patient is a 88y old  Male who presents with a chief complaint of RUE swelling and erythema (08 Jul 2019 14:41)      Follow Up/CC:  ID following for right UE swelling    Interval History/ROS: better, no fever    Allergies    No Known Allergies    Intolerances        ANTIMICROBIALS:      MEDICATIONS  (STANDING):  acetaminophen   Tablet .. 975 milliGRAM(s) Oral every 12 hours  AQUAPHOR (petrolatum Ointment) 1 Application(s) Topical two times a day  epoetin laz Injectable 79247 Unit(s) SubCutaneous every 7 days  finasteride 5 milliGRAM(s) Oral daily  hydrALAZINE 10 milliGRAM(s) Oral every 8 hours  lactobacillus acidophilus and bulgaricus Chewable 1 Tablet(s) Chew daily  lidocaine   Patch 1 Patch Transdermal daily  metoprolol tartrate 25 milliGRAM(s) Oral two times a day  sodium chloride 0.9%. 1000 milliLiter(s) (50 mL/Hr) IV Continuous <Continuous>    MEDICATIONS  (PRN):  simethicone 80 milliGRAM(s) Chew every 4 hours PRN Gas  traMADol 25 milliGRAM(s) Oral every 8 hours PRN Severe Pain (7 - 10)        Vital Signs Last 24 Hrs  T(C): 36.3 (09 Jul 2019 10:47), Max: 37.4 (09 Jul 2019 05:59)  T(F): 97.4 (09 Jul 2019 10:47), Max: 99.3 (09 Jul 2019 05:59)  HR: 81 (09 Jul 2019 10:47) (61 - 98)  BP: 113/61 (09 Jul 2019 10:47) (106/59 - 133/66)  BP(mean): --  RR: 18 (09 Jul 2019 10:47) (18 - 18)  SpO2: 93% (09 Jul 2019 10:47) (93% - 99%)    CBC Full  -  ( 09 Jul 2019 08:46 )  WBC Count : 9.76 K/uL  RBC Count : 2.89 M/uL  Hemoglobin : 7.8 g/dL  Hematocrit : 24.6 %  Platelet Count - Automated : 357 K/uL  Mean Cell Volume : 85.1 fl  Mean Cell Hemoglobin : 27.0 pg  Mean Cell Hemoglobin Concentration : 31.7 gm/dL  Auto Neutrophil # : x  Auto Lymphocyte # : x  Auto Monocyte # : x  Auto Eosinophil # : x  Auto Basophil # : x  Auto Neutrophil % : x  Auto Lymphocyte % : x  Auto Monocyte % : x  Auto Eosinophil % : x  Auto Basophil % : x    07-09    135  |  99  |  101<H>  ----------------------------<  140<H>  4.1   |  19<L>  |  3.65<H>    Ca    8.0<L>      09 Jul 2019 07:27            MICROBIOLOGY:  .Blood  07-02-19   No growth at 5 days.  --  --          RADIOLOGY    < from: CT Upper Extremity No Cont, Right (07.06.19 @ 17:51) >  Extensive cellulitis extending from the mid shaft of the humerus to the   wrist which is most prominent along the dorsum of the forearm. There is   evidence of mild intramuscular myofascial edema about both the anterior   and posterior compartments of the forearm. There is no subcutaneous air.   There is no CT evidence of osteomyelitis.    Nonspecific extensive mineralization within the posterior soft tissues   extending from the level of the olecranon process distally along the   distal shaft of the ulna. This may be related to the sequela of prior   inflammatory or posttraumatic processes.    < end of copied text >
Felice Mora MD  Pager [phone] [place] [phone]  Cell Phone [phone]    Patient is a 88y old  Male who presents with a chief complaint of RUE swelling and erythema (04 Jul 2019 13:16)        SUBJECTIVE / OVERNIGHT EVENTS: Patient still c/o RUE pain and swelling. Denies BM for several days      MEDICATIONS  (STANDING):  cefTRIAXone   IVPB      cefTRIAXone   IVPB 1000 milliGRAM(s) IV Intermittent every 24 hours  docusate sodium 100 milliGRAM(s) Oral three times a day  finasteride 5 milliGRAM(s) Oral daily  hydrALAZINE 10 milliGRAM(s) Oral every 8 hours  lactobacillus acidophilus and bulgaricus Chewable 1 Tablet(s) Chew daily  losartan 25 milliGRAM(s) Oral daily  metoprolol tartrate 25 milliGRAM(s) Oral two times a day  polyethylene glycol 3350 17 Gram(s) Oral daily  warfarin 2.5 milliGRAM(s) Oral once    MEDICATIONS  (PRN):  acetaminophen   Tablet .. 650 milliGRAM(s) Oral every 4 hours PRN Mild Pain (1 - 3)      Vital Signs Last 24 Hrs  T(C): 36.8 (05 Jul 2019 09:18), Max: 37.5 (05 Jul 2019 04:37)  T(F): 98.3 (05 Jul 2019 09:18), Max: 99.5 (05 Jul 2019 04:37)  HR: 88 (05 Jul 2019 09:18) (69 - 88)  BP: 109/71 (05 Jul 2019 09:18) (109/71 - 145/72)  BP(mean): --  RR: 18 (05 Jul 2019 09:18) (18 - 19)  SpO2: 98% (05 Jul 2019 09:18) (98% - 100%)  CAPILLARY BLOOD GLUCOSE        I&O's Summary    04 Jul 2019 07:01  -  05 Jul 2019 07:00  --------------------------------------------------------  IN: 480 mL / OUT: 1450 mL / NET: -970 mL    05 Jul 2019 07:01  -  05 Jul 2019 12:10  --------------------------------------------------------  IN: 120 mL / OUT: 400 mL / NET: -280 mL          PHYSICAL EXAM  GENERAL: NAD, well-developed  HEAD:  Atraumatic, Normocephalic  EYES: EOMI, PERRLA, conjunctiva and sclera clear  CHEST/LUNG: Clear to auscultation bilaterally; No wheeze  HEART: Irregular rate and rhythm; 4/6 blowing systolic murmur  ABDOMEN: Soft, Nontender, Nondistended; Bowel sounds present  EXTREMITIES:  2+ Peripheral Pulses, No clubbing, cyanosis; 2-3+ pedal edema, pitting. RUE with + edema; erythema on posterior forearm to elbow  PSYCH: AAOx3      LABS:                        8.5    11.61 )-----------( 332      ( 05 Jul 2019 09:00 )             27.2     07-05    137  |  101  |  96<H>  ----------------------------<  168<H>  3.9   |  20<L>  |  3.15<H>    Ca    8.7      05 Jul 2019 07:06  Phos  4.4     07-04  Mg     1.8     07-04      PT/INR - ( 05 Jul 2019 08:37 )   PT: 22.3 sec;   INR: 1.93 ratio                   Culture - Blood (collected 02 Jul 2019 22:14)  Source: .Blood  Preliminary Report (03 Jul 2019 23:01):    No growth to date.    Culture - Blood (collected 02 Jul 2019 22:14)  Source: .Blood  Preliminary Report (03 Jul 2019 23:01):    No growth to date.        RADIOLOGY & ADDITIONAL TESTS:    Imaging Personally Reviewed:  Consultant(s) Notes Reviewed:    Care Discussed with Consultants/Other Providers:
Patient is a 88y old  Male who presents with a chief complaint of RUE swelling and erythema (03 Jul 2019 03:00)      SUBJECTIVE / OVERNIGHT EVENTS: Patient seen and examined at bedside. He denies any CP, SOB, abd pain and n/v. States his left arm is  and swollen.     ROS:  All other review of systems negative    Allergies    No Known Allergies    Intolerances        MEDICATIONS  (STANDING):  cefTRIAXone   IVPB      cefTRIAXone   IVPB 1000 milliGRAM(s) IV Intermittent every 24 hours  finasteride 5 milliGRAM(s) Oral daily  hydrALAZINE 10 milliGRAM(s) Oral every 8 hours  lactobacillus acidophilus and bulgaricus Chewable 1 Tablet(s) Chew daily  losartan 25 milliGRAM(s) Oral daily  metoprolol tartrate 25 milliGRAM(s) Oral two times a day    MEDICATIONS  (PRN):  acetaminophen   Tablet .. 650 milliGRAM(s) Oral every 4 hours PRN Mild Pain (1 - 3)      Vital Signs Last 24 Hrs  T(C): 36.8 (04 Jul 2019 11:08), Max: 36.9 (04 Jul 2019 05:26)  T(F): 98.2 (04 Jul 2019 11:08), Max: 98.5 (04 Jul 2019 05:26)  HR: 91 (04 Jul 2019 11:08) (59 - 91)  BP: 133/66 (04 Jul 2019 11:08) (120/65 - 158/77)  BP(mean): --  RR: 18 (04 Jul 2019 11:08) (16 - 18)  SpO2: 98% (04 Jul 2019 11:08) (96% - 99%)  CAPILLARY BLOOD GLUCOSE        I&O's Summary    03 Jul 2019 07:01  -  04 Jul 2019 07:00  --------------------------------------------------------  IN: 600 mL / OUT: 650 mL / NET: -50 mL    04 Jul 2019 07:01  -  04 Jul 2019 13:16  --------------------------------------------------------  IN: 120 mL / OUT: 250 mL / NET: -130 mL        PHYSICAL EXAM:  GENERAL: NAD, well-developed  HEAD:  Atraumatic, Normocephalic  EYES: EOMI, PERRLA, conjunctiva and sclera clear  NECK: Supple, No JVD  CHEST/LUNG: Clear to auscultation bilaterally; No wheeze  HEART: Regular rate and rhythm; No murmurs, rubs, or gallops  ABDOMEN: Soft, Nontender, Nondistended; Bowel sounds present  EXTREMITIES:  RUE: + warmth, + erythema from hand and to might humerus, + swelling , + TTP hand>arm   NEUROLOGY: AAOx3, non-focal      LABS:                        8.3    10.44 )-----------( 313      ( 04 Jul 2019 09:29 )             26.7     07-04    141  |  101  |  98<H>  ----------------------------<  122<H>  4.1   |  23  |  2.92<H>    Ca    8.9      04 Jul 2019 06:03  Phos  4.4     07-04  Mg     1.8     07-04    TPro  6.7  /  Alb  3.4  /  TBili  0.5  /  DBili  x   /  AST  6<L>  /  ALT  6<L>  /  AlkPhos  64  07-02    PT/INR - ( 04 Jul 2019 09:44 )   PT: 69.0 sec;   INR: 5.69 ratio         PTT - ( 02 Jul 2019 16:36 )  PTT:49.2 sec          RADIOLOGY & ADDITIONAL TESTS:    Care Discussed with Consultants/Other Providers: Medicine NP

## 2019-07-10 NOTE — PROGRESS NOTE ADULT - PROBLEM SELECTOR PLAN 6
- Normotensive  - resume home meds with hold parameters
Normotensive  - resume home meds with hold parameters
- Normotensive  - hold losartan pt with pressures on the low end
- Normotensive  - resume home meds with hold parameters
- Normotensive  - resume home meds with hold parameters

## 2019-07-10 NOTE — PROGRESS NOTE ADULT - ASSESSMENT
87yo M w/ PMH HTN, CHF s/p PPM, afib on Coumadin, CKD, BPH, gout presenting with 1 month of R shoulder pain and R arm swelling and 2 weeks of R arm erythema concerning for cellulitis vs thrombophlebitis.
87yo M w/ PMH HTN, CHF s/p PPM, afib on Coumadin, CKD, BPH, gout presenting with 1 month of R shoulder pain and R arm swelling and 2 weeks of R arm erythema concerning for cellulitis vs thrombophlebitis.
87yo M w/ PMH HTN, CHF s/p PPM, afib on Coumadin, chronic renal insufficiency, BPH, gout presenting with R arm swelling of one months duration with initial leukocytosis with initial improvement now increasing. ID consulted for lack of improvement in erythema and rising leukocytosis.  No DVT seen on doppler.  CT with RUE cellulitis. Leukocytosis now improving. Afebrile. TRAVIS.    RUE swelling, erythema, leukocytosis, cellulitis  - Right arm remains painful, swelling ,erythema improved  - Low grade fever on admission.     - BCx NGTD  - can c/w ceftriaxone until 7/9/19  - Monitor renal function
87yo M w/ PMH HTN, HFpEF, Afib on couamdin s/p PPM, CKD3, BPH, gout presenting with 1 month of R shoulder pain and R arm swelling and 2 weeks of R arm erythema concerning for cellulitis
89yo M w/ PMH HTN, HFpEF, Afib on couamdin s/p PPM, CKD3, BPH, gout presenting with 1 month of R shoulder pain and R arm swelling and 2 weeks of R arm erythema concerning for cellulitis
89yo M w/ PMH HTN, HFpEF, Afib on couamdin s/p PPM, CKD3, BPH, gout presenting with 1 month of R shoulder pain and R arm swelling and 2 weeks of R arm erythema concerning for cellulitis vs thrombophlebitis.
87yo M w/ PMH HTN, CHF s/p PPM, afib on Coumadin, chronic renal insufficiency, BPH, gout presenting with R arm swelling of one months duration with initial leukocytosis with initial improvement now increasing. ID consulted for lack of improvement in erythema and rising leukocytosis.  No DVT seen on doppler.  CT with RUE cellulitis. Leukocytosis now improving. Afebrile. TRAVIS.    RUE swelling, erythema, leukocytosis, cellulitis  - Right arm remains painful, swelling ,erythema improved  - Low grade fever on admission.     - BCx NGTD  - can c/w ceftriaxone   - 2 more days of abx- can switch to po keflex 250 mg po daily   - Monitor renal function
89yo M w/ PMH HTN, CHF s/p PPM, afib on Coumadin, CKD, BPH, gout presenting with 1 month of R shoulder pain and R arm swelling and 2 weeks of R arm erythema concerning for cellulitis vs thrombophlebitis.
89yo M w/ PMH HTN, CHF s/p PPM, afib on Coumadin, CKD, BPH, gout presenting with 1 month of R shoulder pain and R arm swelling and 2 weeks of R arm erythema concerning for cellulitis vs thrombophlebitis.
89yo M w/ PMH HTN, CHF s/p PPM, afib on Coumadin, chronic renal insufficiency, BPH, gout presenting with R arm swelling of one months duration with initial leukocytosis with initial improvement now increasing. ID consulted for lack of improvement in erythema and rising leukocytosis.  No DVT seen on doppler.  CT with RUE cellulitis. Leukocytosis now improving. Afebrile. TRAVIS.    RUE swelling, erythema, leukocytosis, cellulitis  - Right arm remains painful, swelling ,erythema improved  - Low grade fever on admission.     - BCx NGTD  - can c/w ceftriaxone   - 2 more days of abx- can switch to po keflex   - Monitor renal function

## 2019-07-10 NOTE — PROGRESS NOTE ADULT - PROBLEM SELECTOR PROBLEM 4
Congestive heart failure

## 2019-07-11 ENCOUNTER — INBOUND DOCUMENT (OUTPATIENT)
Age: 84
End: 2019-07-11

## 2019-07-20 ENCOUNTER — EMERGENCY (EMERGENCY)
Facility: HOSPITAL | Age: 84
LOS: 1 days | End: 2019-07-20
Attending: EMERGENCY MEDICINE | Admitting: EMERGENCY MEDICINE
Payer: MEDICARE

## 2019-07-20 VITALS — WEIGHT: 188.05 LBS | HEIGHT: 68 IN | TEMPERATURE: 96 F

## 2019-07-20 LAB — GLUCOSE BLDC GLUCOMTR-MCNC: 145 MG/DL — HIGH (ref 70–99)

## 2019-07-20 PROCEDURE — 31500 INSERT EMERGENCY AIRWAY: CPT

## 2019-07-20 PROCEDURE — 96374 THER/PROPH/DIAG INJ IV PUSH: CPT | Mod: XU

## 2019-07-20 PROCEDURE — 99285 EMERGENCY DEPT VISIT HI MDM: CPT | Mod: 25

## 2019-07-20 PROCEDURE — 92950 HEART/LUNG RESUSCITATION CPR: CPT

## 2019-07-20 PROCEDURE — 96375 TX/PRO/DX INJ NEW DRUG ADDON: CPT | Mod: XU

## 2019-07-20 PROCEDURE — 82962 GLUCOSE BLOOD TEST: CPT

## 2019-07-20 RX ORDER — EPINEPHRINE 0.3 MG/.3ML
1 INJECTION INTRAMUSCULAR; SUBCUTANEOUS ONCE
Refills: 0 | Status: COMPLETED | OUTPATIENT
Start: 2019-07-20 | End: 2019-07-20

## 2019-07-20 RX ORDER — ATROPINE SULFATE 0.1 MG/ML
1 SYRINGE (ML) INJECTION ONCE
Refills: 0 | Status: COMPLETED | OUTPATIENT
Start: 2019-07-20 | End: 2019-07-20

## 2019-07-20 RX ADMIN — Medication 1 MILLIGRAM(S): at 07:15

## 2019-07-20 RX ADMIN — EPINEPHRINE 1 MILLIGRAM(S): 0.3 INJECTION INTRAMUSCULAR; SUBCUTANEOUS at 07:18

## 2019-07-20 RX ADMIN — EPINEPHRINE 1 MILLIGRAM(S): 0.3 INJECTION INTRAMUSCULAR; SUBCUTANEOUS at 07:15

## 2019-07-20 NOTE — ED ADULT NURSE REASSESSMENT NOTE - NS ED NURSE REASSESS COMMENT FT1
Pt BIB EMS in cardiac arrest, pt unresponsive, BLS and ACLS in progress.  Pt was ambu with 100% oxygen, chest compressions adequate.  Pt had IV at right wrist infusing NS.  Pt had atropine, epinephrine and bicarb as ordered.  Pt was intubated with 7 ETT 21 cm at the lip, chest todd equally and placement confirmed by auscultation.  Pt pronounced dead at 721a after consistent no pulse, asystole.

## 2019-07-20 NOTE — ED PROVIDER NOTE - OBJECTIVE STATEMENT
Pt from nursing home, [facility]. pt last seen alive 3am. pt found unresponsive in bed , ems called, cpr in progress, fs 200, asystole. no intubation in field. pt received 1 amp bicarb and epi x4 pta. cpr in progress

## 2019-07-20 NOTE — ED ADULT NURSE NOTE - NSIMPLEMENTINTERV_GEN_ALL_ED
Implemented All Fall with Harm Risk Interventions:  [place] to call system. Call bell, personal items and telephone within reach. Instruct patient to call for assistance. Room bathroom lighting operational. Non-slip footwear when patient is off stretcher. Physically safe environment: no spills, clutter or unnecessary equipment. Stretcher in lowest position, wheels locked, appropriate side rails in place. Provide visual cue, wrist band, yellow gown, etc. Monitor gait and stability. Monitor for mental status changes and reorient to person, place, and time. Review medications for side effects contributing to fall risk. Reinforce activity limits and safety measures with patient and family. Provide visual clues: red socks.

## 2019-07-20 NOTE — ED ADULT TRIAGE NOTE - CHIEF COMPLAINT QUOTE
Pt on full cardiac arrest BIB by [place] [employer]. As per EMS pt asystole, ambu bag in place for ventilation, CPR in progress. Pt was found unresponsive at rehab facility last seen fine at 3am. No shock given to pt. 4 epis and 1 Bicarb given on the field.

## 2019-07-20 NOTE — ED ADULT NURSE NOTE - PMH
Atrial fibrillation    BPH (benign prostatic hyperplasia)    Congestive heart failure    HTN (hypertension)

## 2019-07-20 NOTE — ED PROVIDER NOTE - PHYSICAL EXAMINATION
Gen:  unresponsive, chest compressions in progress  Head:  atraumatic, normocephalic  HEENT: fixed dilated pupils, non reactive  CV:  no hr  Pulm: +bs with bvm  Abd: non distended  MSK:  no moving extremities  Neuro:  unable to obtain  Skin:  clear, dry, intact

## 2019-07-20 NOTE — ED PROVIDER NOTE - PROGRESS NOTE DETAILS
pt with persistent asystole, time of death 7:21am pmd and family notified, family considering autopsy  as per medical examiner, not an me case

## 2019-08-05 ENCOUNTER — RX RENEWAL (OUTPATIENT)
Age: 84
End: 2019-08-05

## 2019-08-05 RX ORDER — WARFARIN 2.5 MG/1
2.5 TABLET ORAL DAILY
Qty: 90 | Refills: 3 | Status: ACTIVE | COMMUNITY
Start: 2018-05-16 | End: 1900-01-01

## 2019-09-02 PROBLEM — I34.0 NON-RHEUMATIC MITRAL REGURGITATION: Status: ACTIVE | Noted: 2018-11-27

## 2019-09-03 ENCOUNTER — APPOINTMENT (OUTPATIENT)
Dept: CARDIOLOGY | Facility: CLINIC | Age: 84
End: 2019-09-03

## 2021-02-12 NOTE — DISCHARGE NOTE ADULT - ABILITY TO HEAR (WITH HEARING AID OR HEARING APPLIANCE IF NORMALLY USED):
To get better and follow your care plan as instructed. Adequate: hears normal conversation without difficulty

## 2022-01-18 NOTE — PROGRESS NOTE ADULT - RESPIRATORY AND THORAX
The patient's lipids are adequately controlled on current medication.  The patient is tolerating medication without side effects.  I will continue the current treatment.  
details…
details…

## 2022-02-11 NOTE — DISCHARGE NOTE ADULT - WARFARIN/COUMADIN - COMPLIANCE
Reviewed PTA medication list with patient/caregiver and patient/caregiver denies any additional medications.  Patient admits to having a responsible adult care for them for at least 24 hours after surgery.   
Statement Selected

## 2023-02-13 NOTE — PROGRESS NOTE ADULT - ASSESSMENT
87 y.o male w/ pmhx a.fib, diastolic CHF (last echo noted 4/2015: LV 60%, severe pulm. htn, mod. AS), HTN presenting w/ c/o of CP, SOB admitted for hypoxic respiratory failure and acute on chronic diastolic HF exacerbation most likely in setting of HTN urgency and worsening renal function, TRAVIS on CKD. Detail Level: Detailed Size Of Lesion In Cm (Optional): 0 Introduction Text (Please End With A Colon): The following procedure was deferred: jewel cautery

## 2023-06-23 NOTE — PATIENT PROFILE ADULT. - ALCOHOL USE HISTORY SINGLE SELECT
Patient is a 73y old  Male who presents with a chief complaint of Klebsiella bacteremia (23 Jun 2023 12:24)  pt POD # 2 s/p robotic assisted lap antonio and POD # 1 s/p ERCP and stent placement  no events noted overnight  pt currently getting bedside HD  pt feels well, denies n/v/f/c    pt doing well from surgical standpoint    labs reviewed    plan  - reg diet  - analgesia prn  - IV abx  - gi f/u  never

## 2024-10-22 NOTE — ED ADULT NURSE NOTE - NS ED NURSE LEVEL OF CONSCIOUSNESS AFFECT
Calm/Appropriate
[facility]  [address] [place] [address], [address]  [place] [address]  Phone: [phone]  Fax: [phone]  Follow Up Time: